# Patient Record
Sex: FEMALE | Race: WHITE | NOT HISPANIC OR LATINO | Employment: OTHER | URBAN - METROPOLITAN AREA
[De-identification: names, ages, dates, MRNs, and addresses within clinical notes are randomized per-mention and may not be internally consistent; named-entity substitution may affect disease eponyms.]

---

## 2017-03-13 DIAGNOSIS — E11.9 TYPE 2 DIABETES, HBA1C GOAL < 7% (H): ICD-10-CM

## 2017-03-13 NOTE — TELEPHONE ENCOUNTER
camryn mariano       Last Written Prescription Date: 9/28/16  Last Fill Quantity: 200,  # refills: 0   Last Office Visit with [facility], [facility] or [facility] prescribing provider: 11/28/16  Last date of pharmacy refill 12/20/16

## 2017-04-27 ENCOUNTER — HOSPITAL ENCOUNTER (OUTPATIENT)
Dept: MAMMOGRAPHY | Facility: CLINIC | Age: 72
Discharge: HOME | End: 2017-04-27
Attending: FAMILY MEDICINE | Admitting: FAMILY MEDICINE
Payer: MEDICARE

## 2017-04-27 DIAGNOSIS — Z12.31 VISIT FOR SCREENING MAMMOGRAM: ICD-10-CM

## 2017-04-27 PROCEDURE — G0202 SCR MAMMO BI INCL CAD: HCPCS

## 2017-05-09 DIAGNOSIS — I10 BENIGN ESSENTIAL HYPERTENSION: ICD-10-CM

## 2017-05-09 RX ORDER — LISINOPRIL 10 MG/1
TABLET ORAL
Qty: 90 TABLET | Refills: 1 | Status: SHIPPED | OUTPATIENT
Start: 2017-05-09 | End: 2017-08-02 | Stop reason: ALTCHOICE

## 2017-05-09 NOTE — TELEPHONE ENCOUNTER
LISINOPRIL 10MG TAB        Last Written Prescription Date: 12/16/16  Last Fill Quantity: 90, # refills: 2  [place] [facility] [place] [facility] or [facility] prescribing provider: 11/28/16       Potassium   Date Value Ref Range Status   06/06/2016 4.6 3.4 - 5.3 mmol/L Final     Creatinine   Date Value Ref Range Status   06/06/2016 0.63 0.52 - 1.04 mg/dL Final     BP Readings from Last 3 Encounters:   11/28/16 136/86   06/01/16 136/82   11/30/15 144/84

## 2017-07-02 DIAGNOSIS — E11.9 TYPE 2 DIABETES, HBA1C GOAL < 7% (H): Primary | ICD-10-CM

## 2017-07-02 DIAGNOSIS — E78.5 HYPERLIPIDEMIA LDL GOAL <100: ICD-10-CM

## 2017-07-03 RX ORDER — PRAVASTATIN SODIUM 40 MG
TABLET ORAL
Qty: 90 TABLET | Refills: 0 | Status: SHIPPED | OUTPATIENT
Start: 2017-07-03 | End: 2017-08-02

## 2017-07-03 NOTE — TELEPHONE ENCOUNTER
Medication is being filled for 1 time refill only due to:  Patient needs labs lipids, microalbumin, hgba1c, bmp. Future labs ordered yes. Patient needs to be seen because overdue for 3 month follow up.. Aramis Torres RN

## 2017-07-03 NOTE — TELEPHONE ENCOUNTER
PRAVASTATIN 40MG TAB       Last Written Prescription Date: 6/1/16  Last Fill Quantity: 90, # refills: 3  Last Office Visit with [facility], P or [facility] prescribing provider: 11/28/16       Lab Results   Component Value Date    CHOL 180 06/06/2016     Lab Results   Component Value Date    HDL 73 06/06/2016     Lab Results   Component Value Date    LDL 81 06/06/2016     Lab Results   Component Value Date    TRIG 131 06/06/2016     Lab Results   Component Value Date    CHOLHDLRATIO 2.7 08/21/2015

## 2017-07-11 ENCOUNTER — TELEPHONE (OUTPATIENT)
Dept: FAMILY MEDICINE | Facility: CLINIC | Age: 72
End: 2017-07-11

## 2017-07-11 NOTE — TELEPHONE ENCOUNTER
Panel Management Review      Patient has the following on her problem list:     Diabetes    ASA: Passed    Last A1C  Lab Results   Component Value Date    A1C 9.4 11/28/2016    A1C 6.2 06/06/2016    A1C 6.9 08/21/2015    A1C 7.1 05/05/2015    A1C 7.3 10/20/2014     A1C tested: FAILED    Last LDL:    Lab Results   Component Value Date    CHOL 180 06/06/2016     Lab Results   Component Value Date    HDL 73 06/06/2016     Lab Results   Component Value Date    LDL 81 06/06/2016     Lab Results   Component Value Date    TRIG 131 06/06/2016     Lab Results   Component Value Date    CHOLHDLRATIO 2.7 08/21/2015     Lab Results   Component Value Date    NHDL 107 06/06/2016       Is the patient on a Statin? YES             Is the patient on Aspirin? YES    Medications     HMG CoA Reductase Inhibitors    pravastatin (PRAVACHOL) 40 MG tablet    Salicylates    aspirin 81 MG tablet          Last three blood pressure readings:  BP Readings from Last 3 Encounters:   11/28/16 136/86   06/01/16 136/82   11/30/15 144/84       Date of last diabetes office visit: 11/2016     Tobacco History:     History   Smoking Status     Former Smoker     Quit date: 1/1/1971   Smokeless Tobacco     Never Used           Composite cancer screening  Chart review shows that this patient is due/due soon for the following None  Summary:    Patient is due/failing the following:   Labs: Micro, Creatinine, A1C and LDL    Action needed:   Patient needs fasting lab only appointment    Type of outreach:    Sent [employer] message.    Questions for provider review:    None                                                                                                                                    Lay Bergeron CMA

## 2017-08-02 ENCOUNTER — OFFICE VISIT (OUTPATIENT)
Dept: FAMILY MEDICINE | Facility: CLINIC | Age: 72
End: 2017-08-02
Payer: COMMERCIAL

## 2017-08-02 VITALS
SYSTOLIC BLOOD PRESSURE: 148 MMHG | HEART RATE: 88 BPM | BODY MASS INDEX: 30.47 KG/M2 | TEMPERATURE: 98.5 F | WEIGHT: 182.9 LBS | HEIGHT: 65 IN | DIASTOLIC BLOOD PRESSURE: 89 MMHG

## 2017-08-02 DIAGNOSIS — Z78.0 ASYMPTOMATIC POSTMENOPAUSAL STATUS: ICD-10-CM

## 2017-08-02 DIAGNOSIS — I10 BENIGN ESSENTIAL HYPERTENSION: ICD-10-CM

## 2017-08-02 DIAGNOSIS — H93.11 TINNITUS, RIGHT: ICD-10-CM

## 2017-08-02 DIAGNOSIS — Z11.59 NEED FOR HEPATITIS C SCREENING TEST: ICD-10-CM

## 2017-08-02 DIAGNOSIS — E11.69 TYPE 2 DIABETES MELLITUS WITH HYPERLIPIDEMIA (H): Primary | ICD-10-CM

## 2017-08-02 DIAGNOSIS — E78.5 HYPERLIPIDEMIA LDL GOAL <100: ICD-10-CM

## 2017-08-02 DIAGNOSIS — E78.5 TYPE 2 DIABETES MELLITUS WITH HYPERLIPIDEMIA (H): Primary | ICD-10-CM

## 2017-08-02 DIAGNOSIS — Z13.89 SCREENING FOR DIABETIC PERIPHERAL NEUROPATHY: ICD-10-CM

## 2017-08-02 LAB
ANION GAP SERPL CALCULATED.3IONS-SCNC: 8 MMOL/L (ref 3–14)
BUN SERPL-MCNC: 11 MG/DL (ref 7–30)
CALCIUM SERPL-MCNC: 9.1 MG/DL (ref 8.5–10.1)
CHLORIDE SERPL-SCNC: 103 MMOL/L (ref 94–109)
CHOLEST SERPL-MCNC: 175 MG/DL
CO2 SERPL-SCNC: 24 MMOL/L (ref 20–32)
CREAT SERPL-MCNC: 0.57 MG/DL (ref 0.52–1.04)
CREAT UR-MCNC: 90 MG/DL
GFR SERPL CREATININE-BSD FRML MDRD: ABNORMAL ML/MIN/1.7M2
GLUCOSE SERPL-MCNC: 174 MG/DL (ref 70–99)
HBA1C MFR BLD: 7.5 % (ref 4.3–6)
HCV AB SERPL QL IA: NORMAL
HDLC SERPL-MCNC: 68 MG/DL
LDLC SERPL CALC-MCNC: 75 MG/DL
MICROALBUMIN UR-MCNC: 6 MG/L
MICROALBUMIN/CREAT UR: 6.25 MG/G CR (ref 0–25)
NONHDLC SERPL-MCNC: 107 MG/DL
POTASSIUM SERPL-SCNC: 4.2 MMOL/L (ref 3.4–5.3)
SODIUM SERPL-SCNC: 135 MMOL/L (ref 133–144)
TRIGL SERPL-MCNC: 159 MG/DL

## 2017-08-02 PROCEDURE — 80061 LIPID PANEL: CPT | Performed by: FAMILY MEDICINE

## 2017-08-02 PROCEDURE — 99214 OFFICE O/P EST MOD 30 MIN: CPT | Performed by: FAMILY MEDICINE

## 2017-08-02 PROCEDURE — 99207 C FOOT EXAM  NO CHARGE: CPT | Performed by: FAMILY MEDICINE

## 2017-08-02 PROCEDURE — 82043 UR ALBUMIN QUANTITATIVE: CPT | Performed by: FAMILY MEDICINE

## 2017-08-02 PROCEDURE — 36415 COLL VENOUS BLD VENIPUNCTURE: CPT | Performed by: FAMILY MEDICINE

## 2017-08-02 PROCEDURE — 83036 HEMOGLOBIN GLYCOSYLATED A1C: CPT | Performed by: FAMILY MEDICINE

## 2017-08-02 PROCEDURE — 86803 HEPATITIS C AB TEST: CPT | Performed by: FAMILY MEDICINE

## 2017-08-02 PROCEDURE — 80048 BASIC METABOLIC PNL TOTAL CA: CPT | Performed by: FAMILY MEDICINE

## 2017-08-02 RX ORDER — PRAVASTATIN SODIUM 40 MG
TABLET ORAL
Qty: 90 TABLET | Refills: 3 | Status: ON HOLD | OUTPATIENT
Start: 2017-08-02 | End: 2018-06-26

## 2017-08-02 RX ORDER — LISINOPRIL 10 MG/1
10 TABLET ORAL DAILY
Qty: 90 TABLET | Refills: 3 | Status: CANCELLED | OUTPATIENT
Start: 2017-08-02

## 2017-08-02 RX ORDER — METFORMIN HCL 500 MG
1000 TABLET, EXTENDED RELEASE 24 HR ORAL 2 TIMES DAILY WITH MEALS
Qty: 360 TABLET | Refills: 3 | Status: SHIPPED | OUTPATIENT
Start: 2017-08-02 | End: 2018-08-15

## 2017-08-02 RX ORDER — LOSARTAN POTASSIUM 50 MG/1
50 TABLET ORAL DAILY
Qty: 90 TABLET | Refills: 3 | Status: SHIPPED | OUTPATIENT
Start: 2017-08-02 | End: 2018-08-03

## 2017-08-02 NOTE — PROGRESS NOTES
SUBJECTIVE:                                                    Mackenzie Trinidad is a 72 year old female who presents to clinic today for the following health issues:    Ringing in ear intermittently for the last few months, in the right ear.     Diabetes Follow-up    Patient is checking blood sugars: fasting 143 this morning, this is average. Later in the day 150    Diabetic concerns: None     Symptoms of hypoglycemia (low blood sugar): none     Paresthesias (numbness or burning in feet) or sores: No     Date of last diabetic eye exam: appt set up next week   Lab Results   Component Value Date    A1C 7.5 08/02/2017    A1C 9.4 11/28/2016    A1C 6.2 06/06/2016    A1C 6.9 08/21/2015    A1C 7.1 05/05/2015     Hyperlipidemia Follow-Up    Taking statin?  Yes, no muscle aches from statin    Other lipid medications/supplements?:  none  Component                          Latest Ref Rng & Units 6/6/2016   Cholesterol                                      <200 mg/dL 180   Triglycerides                                    <150 mg/dL 131   HDL Cholesterol                                  >49 mg/dL 73   LDL Cholesterol Calculated                <100 mg/dL 81   Non HDL Cholesterol                         <130 mg/dL 107     Hypertension Follow-up    Outpatient blood pressures are being checked at home.  Results are 133/79.    Low Salt Diet: low salt  BP Readings from Last 6 Encounters:   08/02/17 148/89   11/28/16 136/86   06/01/16 136/82   11/30/15 144/84   05/05/15 (!) 145/94   01/07/15 122/80       Amount of exercise or physical activity: walks daily    Problems taking medications regularly: No    Medication side effects: none    Diet: regular (no restrictions), low salt, low fat/cholesterol and diabetic      Problem list and histories reviewed & adjusted, as indicated.  Additional history: the blood pressure at home is better   She has joined a weight loss group and she is going to try harder she is playing [employer] ball and she is  going to keep that up   a1c much improved she is doing much better         Patient Active Problem List   Diagnosis     FAMILY HISTORY OF OSTEOPORSIS     FAMILY HISTORY OF DIABETES MELLITUS     FAMILY HISTORY OF COLON CANCER     MYOPIA     PRESBYOPIA     Diverticulosis of large intestine     Hyperlipidemia LDL goal <100     Advanced directives, counseling/discussion     Type 2 diabetes, HbA1c goal < 7% (H)     Type 2 diabetes mellitus with hyperlipidemia (H)     Benign essential hypertension     Past Surgical History:   Procedure Laterality Date     ARTHRODESIS FOOT Right 10/23/2014    Procedure: ARTHRODESIS FOOT;  Surgeon: Mauri Gomez DPM;  Location: [facility] OR     COLONOSCOPY  10/10/2007    Repeat in 5 years     SURGICAL HISTORY OF -   1996    Hysterectomy     SURGICAL HISTORY OF -   3/2002    Right hammer toe/bunionectomy       Social History   Substance Use Topics     Smoking status: Former Smoker     Quit date: 1/1/1971     Smokeless tobacco: Never Used     Alcohol use Yes      Comment: social     Family History   Problem Relation Age of Onset     Hypertension Mother      OSTEOPOROSIS Mother      HEART DISEASE Mother 94     small heart attack     CANCER Father      bone cancer     Hypertension Brother      Lipids Sister      DIABETES Sister      diet controlled     Cancer - colorectal Other      Hypertension Brother      DIABETES Brother      Lipids Brother      Cancer - colorectal Brother      Lipids Brother      Hypertension Sister      Asthma No family hx of      C.A.D. No family hx of      CEREBROVASCULAR DISEASE No family hx of      Breast Cancer No family hx of      Prostate Cancer No family hx of          She does have riniging in the ear for a while and then she ignores it we reviewed all of the potential side effects of her medications and around the time when she started the lisinopril, the tinnitus started it si a listed side effects of the med.   She denies hearing loss or vertigo.   She sajan  "cough or cold sx.  It is not interfering yet with her life but it is increasing in intensity         ROS:  Constitutional, HEENT, cardiovascular, pulmonary, gi and gu systems are negative, except as otherwise noted.      OBJECTIVE:                                                    /89  Pulse 88  Temp 98.5  F (36.9  C) (Tympanic)  Ht 5' 5\" (1.651 m)  Wt 182 lb 14.4 oz (83 kg)  BMI 30.44 kg/m2 Body mass index is 30.44 kg/(m^2).   GENERAL APPEARANCE: healthy, alert and no distress  EYES: Eyes grossly normal to inspection, PERRL, conjunctivae and sclerae normal and nystagmus absent  HENT: ear canals and TM's normal and nose and mouth without ulcers or lesions  NECK: no adenopathy, no asymmetry, masses, or scars and thyroid normal to palpation  RESP: lungs clear to auscultation - no rales, rhonchi or wheezes  CV: regular rates and rhythm, normal S1 S2, no S3 or S4 and no murmur, click or rub  ABDOMEN: soft, nontender, without hepatosplenomegaly or masses and bowel sounds normal  MS: extremities normal- no gross deformities noted, varicose veins mild and peripheral pulses normal  NEURO: Normal strength and tone, mentation intact, speech normal, DTR symmetrically normal in lower extremities, rapid alternating movements normal and normal strength throughout  DIABETIC FOOT EXAM: normal DP and PT pulses, no trophic changes or ulcerative lesions and normal sensory exam       ASSESSMENT/PLAN:                                                      1. Type 2 diabetes mellitus with hyperlipidemia (H)  wellcontrolled   - HEMOGLOBIN A1C  - Lipid panel reflex to direct LDL  - Albumin Random Urine Quantitative  - metFORMIN (GLUCOPHAGE-XR) 500 MG 24 hr tablet; Take 2 tablets (1,000 mg) by mouth 2 times daily (with meals)  Dispense: 360 tablet; Refill: 3    2. Benign essential hypertension  Needs improvement will change to losartan increase dose and see if tinnitus resolves   - Basic metabolic panel    3. Asymptomatic " postmenopausal status  Due for dexa  - DEXA HIP/PELVIS/SPINE - Future; Future    4. Need for hepatitis C screening test    - Hepatitis C Screen Reflex to HCV RNA Quant and Genotype    5. Screening for diabetic peripheral neuropathy  Normal exam   - FOOT EXAM  NO CHARGE [[id number]]    6. Need for prophylactic vaccination against Streptococcus pneumoniae (pneumococcus)    - PNEUMOCOCCAL CONJ VACCINE 13 VALENT IM    7. Hyperlipidemia LDL goal <100  Check labs but has been well controlled   - Lipid panel reflex to direct LDL  - pravastatin (PRAVACHOL) 40 MG tablet; TAKE ONE TABLET EVERY DAY  Dispense: 90 tablet; Refill: 3    8. Tinnitus, right    Try change of medications and if increasing may need ENT referral    - losartan (COZAAR) 50 MG tablet; Take 1 tablet (50 mg) by mouth daily  Dispense: 90 tablet; Refill: 3     reports that she quit smoking about 46 years ago. She has never used smokeless tobacco.        Weight management plan: Discussed healthy diet and exercise guidelines and patient will follow up in 6 months in clinic to re-evaluate.  Dorota Art M.D.  [facility]

## 2017-08-02 NOTE — MR AVS SNAPSHOT
After Visit Summary   8/2/2017    Mackenzie Trinidad    MRN: [id number]           Patient Information     Date Of Birth          1945        Visit Information        Provider Department      8/2/2017 8:30 AM Dorota Art MD [facility]        Today's Diagnoses     Type 2 diabetes mellitus with hyperlipidemia (H)    -  1    Benign essential hypertension        Asymptomatic postmenopausal status        Need for hepatitis C screening test        Screening for diabetic peripheral neuropathy        Hyperlipidemia LDL goal <100        Tinnitus, right           Follow-ups after your visit        Follow-up notes from your care team     Return in about 6 months (around 2/2/2018), or if symptoms worsen or fail to improve.      Who to contact     Normal or non-critical lab and imaging results will be communicated to you by FREECULTRhart, letter or phone within 4 business days after the clinic has received the results. If you do not hear from us within 7 days, please contact the clinic through FREECULTRhart or phone. If you have a critical or abnormal lab result, we will notify you by phone as soon as possible.  Submit refill requests through AQUA PURE or call your pharmacy and they will forward the refill request to us. Please allow 3 business days for your refill to be completed.          If you need to speak with a  for additional information , please call: [phone]             Additional Information About Your Visit        FREECULTRharWee Web Information     AQU[employer] PURE gives you secure access to your electronic health record. If you see a primary care provider, you can also send messages to your care team and make appointments. If you have questions, please call your primary care clinic.  If you do not have a primary care provider, please call [phone] and they will assist you.        Care EveryWhere ID     This is your Care EveryWhere ID. This could be used by other organizations to access your  "[place] medical records  [id number]        Your Vitals Were     Pulse Temperature Height BMI (Body Mass Index)          88 98.5  F (36.9  C) (Tympanic) 5' 5\" (1.651 m) 30.44 kg/m2         Blood Pressure from Last 3 Encounters:   08/02/17 148/89   11/28/16 136/86   06/01/16 136/82    Weight from Last 3 Encounters:   08/02/17 182 lb 14.4 oz (83 kg)   11/28/16 185 lb 14.4 oz (84.3 kg)   06/01/16 184 lb (83.5 kg)              We Performed the Following     Albumin Random Urine Quantitative     Basic metabolic panel     Eye Exam - HIM Scan     FOOT EXAM  NO CHARGE [[id number]]     HEMOGLOBIN A1C     Hepatitis C Screen Reflex to HCV RNA Quant and Genotype     Lipid panel reflex to direct LDL          Today's Medication Changes          These changes are accurate as of: 8/2/17 11:59 PM.  If you have any questions, ask your nurse or doctor.               Start taking these medicines.        Dose/Directions    losartan 50 MG tablet   Commonly known as:  COZAAR   Used for:  Tinnitus, right   Started by:  Dorota Art MD        Dose:  50 mg   Take 1 tablet (50 mg) by mouth daily   Quantity:  90 tablet   Refills:  3         These medicines have changed or have updated prescriptions.        Dose/Directions    metFORMIN 500 MG 24 hr tablet   Commonly known as:  GLUCOPHAGE-XR   This may have changed:  Another medication with the same name was removed. Continue taking this medication, and follow the directions you see here.   Used for:  Type 2 diabetes mellitus with hyperlipidemia (H)   Changed by:  Dorota Art MD        Dose:  1000 mg   Take 2 tablets (1,000 mg) by mouth 2 times daily (with meals)   Quantity:  360 tablet   Refills:  3       pravastatin 40 MG tablet   Commonly known as:  PRAVACHOL   This may have changed:  See the new instructions.   Used for:  Hyperlipidemia LDL goal <100   Changed by:  Dorota Art MD        TAKE ONE TABLET EVERY DAY   Quantity:  90 tablet   Refills:  3         Stop taking these " medicines if you haven't already. Please contact your care team if you have questions.     lisinopril 10 MG tablet   Commonly known as:  PRINIVIL/ZESTRIL   Stopped by:  Dorota Art MD                Where to get your medicines      These medications were sent to [facility] [address]  [address], [facility] [address]     Phone:  [phone]     losartan 50 MG tablet    metFORMIN 500 MG 24 hr tablet    pravastatin 40 MG tablet                Primary Care Provider Office Phone # Fax #    Dorota Art [phone] [address] [facility] [address]        Equal Access to Services     [facility]: Hadii aad ku hadasho Soomaali, waaxda luqadaha, qaybta kaalmada adeegyada, waxay malain haybrynnn sue sorto . So [facility] [phone].    ATENCIÓN: Si habla español, tiene a proctor disposición servicios gratuitos de asistencia lingüística. [facility] [phone].    We comply with applicable federal civil rights laws and [place] laws. We do not discriminate on the basis of race, color, national origin, age, disability sex, sexual orientation or gender identity.            Thank you!     Thank you for choosing [facility]  for your care. Our goal is always to provide you with excellent care. Hearing back from our patients is one way we can continue to improve our services. Please take a few minutes to complete the written survey that you may receive in the mail after your visit with us. Thank you!             Your Updated Medication List - Protect others around you: Learn how to safely use, store and throw away your medicines at www.disposemymeds.org.          This list is accurate as of: 8/2/17 11:59 PM.  Always use your most recent med list.                   Brand Name Dispense Instructions for use Diagnosis    aspirin 81 MG tablet      Take  by mouth daily.        blood glucose monitoring lancets     100 each    1 each by Lancet route 2 times  daily    Diabetes mellitus, type 2 (H)       CALCIUM 500 + D PO      1 tablet twice daily        FLAX SEED OIL PO      twice daily        losartan 50 MG tablet    COZAAR    90 tablet    Take 1 tablet (50 mg) by mouth daily    Tinnitus, right       metFORMIN 500 MG 24 hr tablet    GLUCOPHAGE-XR    360 tablet    Take 2 tablets (1,000 mg) by mouth 2 times daily (with meals)    Type 2 diabetes mellitus with hyperlipidemia (H)       pravastatin 40 MG tablet    PRAVACHOL    90 tablet    TAKE ONE TABLET EVERY DAY    Hyperlipidemia LDL goal <100

## 2017-08-06 NOTE — PROGRESS NOTES
Mackenzie,  Your lab results were normal/stable. Please feel free to my chart or call the office with questions. Dorota Art M.D.

## 2017-08-07 DIAGNOSIS — E11.9 TYPE 2 DIABETES, HBA1C GOAL < 7% (H): ICD-10-CM

## 2017-08-07 NOTE — TELEPHONE ENCOUNTER
ACCU-CHEK JULIO CÉSAR + JULIO CÉSAR ALIYAH        Last Written Prescription Date: 3/15/17  Last Fill Quantity: 200,  # refills: 0   Last Office Visit with [facility], [facility] or [facility] prescribing provider: 8/2/17

## 2017-08-08 RX ORDER — BLOOD SUGAR DIAGNOSTIC
STRIP MISCELLANEOUS
Qty: 200 STRIP | Refills: 3 | Status: SHIPPED | OUTPATIENT
Start: 2017-08-08 | End: 2018-09-08

## 2017-08-08 NOTE — TELEPHONE ENCOUNTER
Prescription approved per [facility] Refill Protocol.  Aramis Torres RN

## 2017-08-17 ENCOUNTER — TRANSFERRED RECORDS (OUTPATIENT)
Dept: HEALTH INFORMATION MANAGEMENT | Facility: CLINIC | Age: 72
End: 2017-08-17

## 2017-09-07 ENCOUNTER — HOSPITAL ENCOUNTER (OUTPATIENT)
Dept: BONE DENSITY | Facility: CLINIC | Age: 72
Discharge: HOME | End: 2017-09-07
Attending: FAMILY MEDICINE | Admitting: FAMILY MEDICINE
Payer: MEDICARE

## 2017-09-07 DIAGNOSIS — Z78.0 ASYMPTOMATIC POSTMENOPAUSAL STATUS: ICD-10-CM

## 2017-09-07 PROCEDURE — 77080 DXA BONE DENSITY AXIAL: CPT

## 2017-09-21 NOTE — NURSING NOTE
"Chief Complaint   Patient presents with     Diabetes       Initial /89  Pulse 88  Temp 98.5  F (36.9  C) (Tympanic)  Ht 5' 5\" (1.651 m)  Wt 182 lb 14.4 oz (83 kg)  BMI 30.44 kg/m2 Estimated body mass index is 30.44 kg/(m^2) as calculated from the following:    Height as of this encounter: 5' 5\" (1.651 m).    Weight as of this encounter: 182 lb 14.4 oz (83 kg).  Medication Reconciliation: complete   Lay Bergeron CMA    "

## 2018-01-30 ENCOUNTER — TELEPHONE (OUTPATIENT)
Dept: FAMILY MEDICINE | Facility: CLINIC | Age: 73
End: 2018-01-30

## 2018-01-30 NOTE — TELEPHONE ENCOUNTER
Panel Management Review      Patient has the following on her problem list:     Diabetes    ASA: Passed    Last A1C  Lab Results   Component Value Date    A1C 7.5 08/02/2017    A1C 9.4 11/28/2016    A1C 6.2 06/06/2016    A1C 6.9 08/21/2015    A1C 7.1 05/05/2015     A1C tested: Passed    Last LDL:    Lab Results   Component Value Date    CHOL 175 08/02/2017     Lab Results   Component Value Date    HDL 68 08/02/2017     Lab Results   Component Value Date    LDL 75 08/02/2017     Lab Results   Component Value Date    TRIG 159 08/02/2017     Lab Results   Component Value Date    CHOLHDLRATIO 2.7 08/21/2015     Lab Results   Component Value Date    NHDL 107 08/02/2017       Is the patient on a Statin? YES             Is the patient on Aspirin? YES    Medications     HMG CoA Reductase Inhibitors    pravastatin (PRAVACHOL) 40 MG tablet    Salicylates    aspirin 81 MG tablet          Last three blood pressure readings:  BP Readings from Last 3 Encounters:   08/02/17 148/89   11/28/16 136/86   06/01/16 136/82       Date of last diabetes office visit: 8/2017     Tobacco History:     History   Smoking Status     Former Smoker     Quit date: 1/1/1971   Smokeless Tobacco     Never Used           Composite cancer screening  Chart review shows that this patient is due/due soon for the following None  Summary:    Patient is due/failing the following:   BP CHECK    Action needed:   Patient needs nurse only appointment.    Type of outreach:    Sent "[employer] message.    Questions for provider review:    None                                                                                                                                    Lay Bergeron CMA

## 2018-04-05 ENCOUNTER — TELEPHONE (OUTPATIENT)
Dept: FAMILY MEDICINE | Facility: CLINIC | Age: 73
End: 2018-04-05

## 2018-04-05 NOTE — LETTER
April 5, 2018      Mackenzie Trinidad  [address]  [place] [address]        Dear Mackenzie,    In order to ensure we are providing the best quality care, Dr. Art and I have reviewed your chart and see that you are due for a Diabetes follow up, including lab work.     Please call to set up an office visit at [phone] or you can schedule an appointment through My Chart.     We greatly appreciate the opportunity to serve you. Thank you for trusting us with your health care.    Sincerely,    DIRK Bridges M.D.

## 2018-04-05 NOTE — TELEPHONE ENCOUNTER
Panel Management Review      Patient has the following on her problem list:     Diabetes    ASA: Passed    Last A1C  Lab Results   Component Value Date    A1C 7.5 08/02/2017    A1C 9.4 11/28/2016    A1C 6.2 06/06/2016    A1C 6.9 08/21/2015    A1C 7.1 05/05/2015     A1C tested: Passed    Last LDL:    Lab Results   Component Value Date    CHOL 175 08/02/2017     Lab Results   Component Value Date    HDL 68 08/02/2017     Lab Results   Component Value Date    LDL 75 08/02/2017     Lab Results   Component Value Date    TRIG 159 08/02/2017     Lab Results   Component Value Date    CHOLHDLRATIO 2.7 08/21/2015     Lab Results   Component Value Date    NHDL 107 08/02/2017       Is the patient on a Statin? YES             Is the patient on Aspirin? YES    Medications     HMG CoA Reductase Inhibitors    pravastatin (PRAVACHOL) 40 MG tablet    Salicylates    aspirin 81 MG tablet          Last three blood pressure readings:  BP Readings from Last 3 Encounters:   08/02/17 148/89   11/28/16 136/86   06/01/16 136/82       Date of last diabetes office visit: 8/2/17     Tobacco History:     History   Smoking Status     Former Smoker     Quit date: 1/1/1971   Smokeless Tobacco     Never Used           Composite cancer screening  Chart review shows that this patient is due/due soon for the following Mammogram  Summary:    Patient is due/failing the following:   A1C, FOLLOW UP and MAMMOGRAM    Action needed:   Patient needs office visit for diabetes, bp recheck, a1c.    Type of outreach:    Sent letter.    Questions for provider review:    None                                                                                                                                    Lay Bergeron CMA

## 2018-05-01 ENCOUNTER — HOSPITAL ENCOUNTER (OUTPATIENT)
Dept: MAMMOGRAPHY | Facility: CLINIC | Age: 73
Discharge: HOME | End: 2018-05-01
Attending: FAMILY MEDICINE | Admitting: FAMILY MEDICINE
Payer: MEDICARE

## 2018-05-01 DIAGNOSIS — Z12.31 VISIT FOR SCREENING MAMMOGRAM: ICD-10-CM

## 2018-05-01 PROCEDURE — 77063 BREAST TOMOSYNTHESIS BI: CPT

## 2018-05-18 DIAGNOSIS — H93.11 TINNITUS, RIGHT: ICD-10-CM

## 2018-05-18 RX ORDER — LOSARTAN POTASSIUM 50 MG/1
50 TABLET ORAL DAILY
Qty: 90 TABLET | Refills: 3 | Status: CANCELLED | OUTPATIENT
Start: 2018-05-18

## 2018-05-18 NOTE — TELEPHONE ENCOUNTER
losartan (COZAAR) 50 MG tablet  Last Written Prescription Date:  8/2/17  Last Fill Quantity: 90,  # refills: 3   Last office visit: 8/2/2017 with prescribing provider:  8/2/17   Future Office Visit:

## 2018-05-18 NOTE — TELEPHONE ENCOUNTER
Routing refill request to provider for review/approval because:  Patient was instructed to return around 2/2/18 for a 6 month follow up and has not yet followed up.    Ngoc Clement RN

## 2018-06-23 ENCOUNTER — APPOINTMENT (OUTPATIENT)
Dept: GENERAL RADIOLOGY | Facility: CLINIC | Age: 73
DRG: 355 | End: 2018-06-23
Attending: INTERNAL MEDICINE
Payer: MEDICARE

## 2018-06-23 ENCOUNTER — HOSPITAL ENCOUNTER (INPATIENT)
Facility: CLINIC | Age: 73
LOS: 3 days | Discharge: HOME | DRG: 355 | End: 2018-06-26
Attending: INTERNAL MEDICINE | Admitting: SURGERY
Payer: MEDICARE

## 2018-06-23 ENCOUNTER — APPOINTMENT (OUTPATIENT)
Dept: CT IMAGING | Facility: CLINIC | Age: 73
DRG: 355 | End: 2018-06-23
Attending: INTERNAL MEDICINE
Payer: MEDICARE

## 2018-06-23 DIAGNOSIS — K56.609 SBO (SMALL BOWEL OBSTRUCTION) (H): ICD-10-CM

## 2018-06-23 DIAGNOSIS — E78.5 HYPERLIPIDEMIA LDL GOAL <100: ICD-10-CM

## 2018-06-23 DIAGNOSIS — K43.6 VENTRAL HERNIA WITH OBSTRUCTION AND WITHOUT GANGRENE: ICD-10-CM

## 2018-06-23 PROBLEM — K43.9 VENTRAL HERNIA: Status: ACTIVE | Noted: 2018-06-23

## 2018-06-23 LAB
ALBUMIN SERPL-MCNC: 3.6 G/DL (ref 3.4–5)
ALBUMIN UR-MCNC: NEGATIVE MG/DL
ALP SERPL-CCNC: 69 U/L (ref 40–150)
ALT SERPL W P-5'-P-CCNC: 28 U/L (ref 0–50)
ANION GAP SERPL CALCULATED.3IONS-SCNC: 11 MMOL/L (ref 3–14)
APPEARANCE UR: CLEAR
AST SERPL W P-5'-P-CCNC: 20 U/L (ref 0–45)
BACTERIA #/AREA URNS HPF: ABNORMAL /HPF
BASOPHILS # BLD AUTO: 0.1 10E9/L (ref 0–0.2)
BASOPHILS NFR BLD AUTO: 0.8 %
BILIRUB SERPL-MCNC: 0.3 MG/DL (ref 0.2–1.3)
BILIRUB UR QL STRIP: NEGATIVE
BUN SERPL-MCNC: 11 MG/DL (ref 7–30)
CALCIUM SERPL-MCNC: 8.8 MG/DL (ref 8.5–10.1)
CHLORIDE SERPL-SCNC: 102 MMOL/L (ref 94–109)
CO2 SERPL-SCNC: 25 MMOL/L (ref 20–32)
COLOR UR AUTO: ABNORMAL
CREAT SERPL-MCNC: 0.63 MG/DL (ref 0.52–1.04)
DIFFERENTIAL METHOD BLD: NORMAL
EOSINOPHIL # BLD AUTO: 0.7 10E9/L (ref 0–0.7)
EOSINOPHIL NFR BLD AUTO: 10.6 %
ERYTHROCYTE [DISTWIDTH] IN BLOOD BY AUTOMATED COUNT: 12.9 % (ref 10–15)
GFR SERPL CREATININE-BSD FRML MDRD: >90 ML/MIN/1.7M2
GLUCOSE BLDC GLUCOMTR-MCNC: 185 MG/DL (ref 70–99)
GLUCOSE SERPL-MCNC: 203 MG/DL (ref 70–99)
GLUCOSE UR STRIP-MCNC: 150 MG/DL
HCT VFR BLD AUTO: 39.2 % (ref 35–47)
HGB BLD-MCNC: 13.4 G/DL (ref 11.7–15.7)
HGB UR QL STRIP: NEGATIVE
HYALINE CASTS #/AREA URNS LPF: 1 /LPF (ref 0–2)
IMM GRANULOCYTES # BLD: 0 10E9/L (ref 0–0.4)
IMM GRANULOCYTES NFR BLD: 0.2 %
INR PPP: 0.95 (ref 0.86–1.14)
KETONES UR STRIP-MCNC: 40 MG/DL
LACTATE BLD-SCNC: 2.7 MMOL/L (ref 0.7–2)
LEUKOCYTE ESTERASE UR QL STRIP: ABNORMAL
LIPASE SERPL-CCNC: 169 U/L (ref 73–393)
LYMPHOCYTES # BLD AUTO: 2.5 10E9/L (ref 0.8–5.3)
LYMPHOCYTES NFR BLD AUTO: 40.1 %
MCH RBC QN AUTO: 30.6 PG (ref 26.5–33)
MCHC RBC AUTO-ENTMCNC: 34.2 G/DL (ref 31.5–36.5)
MCV RBC AUTO: 90 FL (ref 78–100)
MONOCYTES # BLD AUTO: 0.4 10E9/L (ref 0–1.3)
MONOCYTES NFR BLD AUTO: 5.7 %
NEUTROPHILS # BLD AUTO: 2.6 10E9/L (ref 1.6–8.3)
NEUTROPHILS NFR BLD AUTO: 42.6 %
NITRATE UR QL: NEGATIVE
NRBC # BLD AUTO: 0 10*3/UL
NRBC BLD AUTO-RTO: 0 /100
NT-PROBNP SERPL-MCNC: 121 PG/ML (ref 0–900)
PH UR STRIP: 7 PH (ref 5–7)
PLATELET # BLD AUTO: 220 10E9/L (ref 150–450)
POTASSIUM SERPL-SCNC: 3.8 MMOL/L (ref 3.4–5.3)
PROT SERPL-MCNC: 7 G/DL (ref 6.8–8.8)
RBC # BLD AUTO: 4.38 10E12/L (ref 3.8–5.2)
RBC #/AREA URNS AUTO: <1 /HPF (ref 0–2)
SODIUM SERPL-SCNC: 138 MMOL/L (ref 133–144)
SOURCE: ABNORMAL
SP GR UR STRIP: 1.02 (ref 1–1.03)
SQUAMOUS #/AREA URNS AUTO: <1 /HPF (ref 0–1)
TROPONIN I BLD-MCNC: 0 UG/L (ref 0–0.1)
TROPONIN I SERPL-MCNC: <0.015 UG/L (ref 0–0.04)
UROBILINOGEN UR STRIP-MCNC: NORMAL MG/DL (ref 0–2)
WBC # BLD AUTO: 6.1 10E9/L (ref 4–11)
WBC #/AREA URNS AUTO: <1 /HPF (ref 0–5)

## 2018-06-23 PROCEDURE — 83690 ASSAY OF LIPASE: CPT | Performed by: INTERNAL MEDICINE

## 2018-06-23 PROCEDURE — 93308 TTE F-UP OR LMTD: CPT | Mod: 26 | Performed by: INTERNAL MEDICINE

## 2018-06-23 PROCEDURE — 85610 PROTHROMBIN TIME: CPT | Performed by: INTERNAL MEDICINE

## 2018-06-23 PROCEDURE — 96374 THER/PROPH/DIAG INJ IV PUSH: CPT | Mod: 59 | Performed by: INTERNAL MEDICINE

## 2018-06-23 PROCEDURE — 80053 COMPREHEN METABOLIC PANEL: CPT | Performed by: INTERNAL MEDICINE

## 2018-06-23 PROCEDURE — 00000146 ZZHCL STATISTIC GLUCOSE BY METER IP

## 2018-06-23 PROCEDURE — 93010 ELECTROCARDIOGRAM REPORT: CPT | Mod: 59 | Performed by: INTERNAL MEDICINE

## 2018-06-23 PROCEDURE — 74177 CT ABD & PELVIS W/CONTRAST: CPT

## 2018-06-23 PROCEDURE — 71045 X-RAY EXAM CHEST 1 VIEW: CPT

## 2018-06-23 PROCEDURE — 99285 EMERGENCY DEPT VISIT HI MDM: CPT | Mod: 25 | Performed by: INTERNAL MEDICINE

## 2018-06-23 PROCEDURE — 85025 COMPLETE CBC W/AUTO DIFF WBC: CPT | Performed by: INTERNAL MEDICINE

## 2018-06-23 PROCEDURE — 25000128 H RX IP 250 OP 636: Performed by: STUDENT IN AN ORGANIZED HEALTH CARE EDUCATION/TRAINING PROGRAM

## 2018-06-23 PROCEDURE — 25000128 H RX IP 250 OP 636: Performed by: INTERNAL MEDICINE

## 2018-06-23 PROCEDURE — 25000125 ZZHC RX 250: Performed by: STUDENT IN AN ORGANIZED HEALTH CARE EDUCATION/TRAINING PROGRAM

## 2018-06-23 PROCEDURE — 93308 TTE F-UP OR LMTD: CPT | Performed by: INTERNAL MEDICINE

## 2018-06-23 PROCEDURE — 99291 CRITICAL CARE FIRST HOUR: CPT | Mod: 25 | Performed by: INTERNAL MEDICINE

## 2018-06-23 PROCEDURE — 84484 ASSAY OF TROPONIN QUANT: CPT | Performed by: INTERNAL MEDICINE

## 2018-06-23 PROCEDURE — 84484 ASSAY OF TROPONIN QUANT: CPT

## 2018-06-23 PROCEDURE — 93005 ELECTROCARDIOGRAM TRACING: CPT | Performed by: INTERNAL MEDICINE

## 2018-06-23 PROCEDURE — 83880 ASSAY OF NATRIURETIC PEPTIDE: CPT | Performed by: INTERNAL MEDICINE

## 2018-06-23 PROCEDURE — 96376 TX/PRO/DX INJ SAME DRUG ADON: CPT | Performed by: INTERNAL MEDICINE

## 2018-06-23 PROCEDURE — 96375 TX/PRO/DX INJ NEW DRUG ADDON: CPT | Performed by: INTERNAL MEDICINE

## 2018-06-23 PROCEDURE — 83605 ASSAY OF LACTIC ACID: CPT | Performed by: INTERNAL MEDICINE

## 2018-06-23 PROCEDURE — 12000008 ZZH R&B INTERMEDIATE UMMC

## 2018-06-23 PROCEDURE — 81001 URINALYSIS AUTO W/SCOPE: CPT | Performed by: INTERNAL MEDICINE

## 2018-06-23 PROCEDURE — 25000128 H RX IP 250 OP 636: Performed by: SURGERY

## 2018-06-23 RX ORDER — ONDANSETRON 2 MG/ML
4 INJECTION INTRAMUSCULAR; INTRAVENOUS ONCE
Status: COMPLETED | OUTPATIENT
Start: 2018-06-23 | End: 2018-06-23

## 2018-06-23 RX ORDER — HYDROMORPHONE HYDROCHLORIDE 1 MG/ML
0.5 INJECTION, SOLUTION INTRAMUSCULAR; INTRAVENOUS; SUBCUTANEOUS ONCE
Status: COMPLETED | OUTPATIENT
Start: 2018-06-23 | End: 2018-06-23

## 2018-06-23 RX ORDER — IOPAMIDOL 755 MG/ML
112 INJECTION, SOLUTION INTRAVASCULAR ONCE
Status: COMPLETED | OUTPATIENT
Start: 2018-06-23 | End: 2018-06-23

## 2018-06-23 RX ORDER — ONDANSETRON 2 MG/ML
4 INJECTION INTRAMUSCULAR; INTRAVENOUS EVERY 6 HOURS PRN
Status: DISCONTINUED | OUTPATIENT
Start: 2018-06-23 | End: 2018-06-26 | Stop reason: HOSPADM

## 2018-06-23 RX ORDER — ASPIRIN 325 MG
325 TABLET ORAL ONCE
Status: DISCONTINUED | OUTPATIENT
Start: 2018-06-23 | End: 2018-06-23 | Stop reason: CLARIF

## 2018-06-23 RX ORDER — NALOXONE HYDROCHLORIDE 0.4 MG/ML
.1-.4 INJECTION, SOLUTION INTRAMUSCULAR; INTRAVENOUS; SUBCUTANEOUS
Status: DISCONTINUED | OUTPATIENT
Start: 2018-06-23 | End: 2018-06-25

## 2018-06-23 RX ORDER — METOCLOPRAMIDE HYDROCHLORIDE 5 MG/ML
5 INJECTION INTRAMUSCULAR; INTRAVENOUS ONCE
Status: COMPLETED | OUTPATIENT
Start: 2018-06-23 | End: 2018-06-23

## 2018-06-23 RX ORDER — ACETAMINOPHEN 325 MG/1
650 TABLET ORAL EVERY 4 HOURS PRN
Status: DISCONTINUED | OUTPATIENT
Start: 2018-06-23 | End: 2018-06-26 | Stop reason: HOSPADM

## 2018-06-23 RX ORDER — ONDANSETRON 4 MG/1
4 TABLET, ORALLY DISINTEGRATING ORAL EVERY 6 HOURS PRN
Status: DISCONTINUED | OUTPATIENT
Start: 2018-06-23 | End: 2018-06-26 | Stop reason: HOSPADM

## 2018-06-23 RX ORDER — SODIUM CHLORIDE, SODIUM LACTATE, POTASSIUM CHLORIDE, CALCIUM CHLORIDE 600; 310; 30; 20 MG/100ML; MG/100ML; MG/100ML; MG/100ML
INJECTION, SOLUTION INTRAVENOUS CONTINUOUS
Status: DISCONTINUED | OUTPATIENT
Start: 2018-06-23 | End: 2018-06-25

## 2018-06-23 RX ADMIN — HYDROMORPHONE HYDROCHLORIDE 0.5 MG: 1 INJECTION, SOLUTION INTRAMUSCULAR; INTRAVENOUS; SUBCUTANEOUS at 19:18

## 2018-06-23 RX ADMIN — METOCLOPRAMIDE 5 MG: 5 INJECTION, SOLUTION INTRAMUSCULAR; INTRAVENOUS at 18:46

## 2018-06-23 RX ADMIN — SODIUM CHLORIDE, PRESERVATIVE FREE 78 ML: 5 INJECTION INTRAVENOUS at 20:09

## 2018-06-23 RX ADMIN — ONDANSETRON 4 MG: 2 INJECTION INTRAMUSCULAR; INTRAVENOUS at 18:28

## 2018-06-23 RX ADMIN — SODIUM CHLORIDE 1000 ML: 9 INJECTION, SOLUTION INTRAVENOUS at 19:19

## 2018-06-23 RX ADMIN — IOPAMIDOL 112 ML: 755 INJECTION, SOLUTION INTRAVENOUS at 20:09

## 2018-06-23 RX ADMIN — HYDROMORPHONE HYDROCHLORIDE 0.5 MG: 1 INJECTION, SOLUTION INTRAMUSCULAR; INTRAVENOUS; SUBCUTANEOUS at 18:45

## 2018-06-23 RX ADMIN — HYDROMORPHONE HYDROCHLORIDE 0.5 MG: 1 INJECTION, SOLUTION INTRAMUSCULAR; INTRAVENOUS; SUBCUTANEOUS at 21:42

## 2018-06-23 ASSESSMENT — ENCOUNTER SYMPTOMS
NAUSEA: 1
DIAPHORESIS: 1
ABDOMINAL PAIN: 1

## 2018-06-23 NOTE — ED PROVIDER NOTES
History     Chief Complaint   Patient presents with     Abdominal Pain     HPI  Mackenzie Trinidad is a 73 year old female with a history of type II diabetes, HTN, appendectomy, prior hernia repair, and hysterectomy who presents for evaluation of abdominal pain. Patient complains of sudden onset sharp, cramping epigastric abdominal pain with associated nausea and diaphoresis that began approximately thirty minutes prior to arrival. Her  reports they were at the [place] [employer] game prior to the onset of her pain where she had eaten one hot dog, one mini donut, and drank one beer. Patient denies any history of similar symptoms. No known history of MI.       I have reviewed the Medications, Allergies, Past Medical and Surgical History, and Social History in the [employer] system.  Past Medical History:   Diagnosis Date     Acute pharyngitis      Gynecological examination      Need for prophylactic hormone replacement therapy (postmenopausal)      PONV (postoperative nausea and vomiting)        Past Surgical History:   Procedure Laterality Date     ARTHRODESIS FOOT Right 10/23/2014    Procedure: ARTHRODESIS FOOT;  Surgeon: Mauri Gomez DPM;  Location: [facility]     COLONOSCOPY  10/10/2007    Repeat in 5 years     SURGICAL HISTORY OF -   1996    Hysterectomy     SURGICAL HISTORY OF -   3/2002    Right hammer toe/bunionectomy       Family History   Problem Relation Age of Onset     Hypertension Mother      Osteoperosis Mother      HEART DISEASE Mother 94     small heart attack     Cancer Father      bone cancer     Hypertension Brother      Lipids Sister      Diabetes Sister      diet controlled     Cancer - colorectal Other      Hypertension Brother      Diabetes Brother      Lipids Brother      Cancer - colorectal Brother      Lipids Brother      Hypertension Sister      Asthma No family hx of      C.A.D. No family hx of      Cerebrovascular Disease No family hx of      Breast Cancer No family hx of      Prostate  Cancer No family hx of        Social History   Substance Use Topics     Smoking status: Former Smoker     Quit date: 1/1/1971     Smokeless tobacco: Never Used     Alcohol use Yes      Comment: social       Current Facility-Administered Medications   Medication     acetaminophen (TYLENOL) tablet 650 mg     aspirin tablet 325 mg     lactated ringers BOLUS 500 mL     lactated ringers infusion     melatonin tablet 1 mg     naloxone (NARCAN) injection 0.1-0.4 mg     ondansetron (ZOFRAN-ODT) ODT tab 4 mg    Or     ondansetron (ZOFRAN) injection 4 mg     Current Outpatient Prescriptions   Medication     aspirin 81 MG tablet     metFORMIN (GLUCOPHAGE-XR) 500 MG 24 hr tablet     ACCU-CHEK JULIO CÉSAR PLUS test strip     CALCIUM 500 + D OR     FLAX SEED OIL OR     FREESTYLE LANCETS MISC     losartan (COZAAR) 50 MG tablet     pravastatin (PRAVACHOL) 40 MG tablet        Allergies   Allergen Reactions     Nkda [No Known Drug Allergies]        Review of Systems   Constitutional: Positive for diaphoresis.   Gastrointestinal: Positive for abdominal pain (epigastric) and nausea.   All other systems reviewed and are negative.      Physical Exam   BP: 168/79  Pulse: 57  Heart Rate: 51  Temp: 97.7  F (36.5  C)  Resp: 16  SpO2: 100 %      Physical Exam   Constitutional: No distress.   HENT:   Head: Atraumatic.   Mouth/Throat: Oropharynx is clear and moist. No oropharyngeal exudate.   Eyes: Pupils are equal, round, and reactive to light. No scleral icterus.   Neck: Neck supple.   Cardiovascular: Normal rate, normal heart sounds and intact distal pulses.  Exam reveals no gallop and no friction rub.    No murmur heard.  Pulmonary/Chest: Effort normal and breath sounds normal. No respiratory distress. She has no wheezes. She has no rales. She exhibits no tenderness.   Abdominal: Soft. Bowel sounds are normal. There is no hepatosplenomegaly. There is tenderness in the periumbilical area. There is no rigidity, no rebound, no guarding, no CVA  tenderness, no tenderness at McBurney's point and negative Lara's sign. A hernia (right lower) is present.       Musculoskeletal: She exhibits no edema or tenderness.   Skin: Skin is warm. No rash noted. She is not diaphoretic.       ED Course     ED Course     Procedures    6:02 PM  The patient was seen and examined by Dr. Kaufman in Room 1.          EKG Interpretation:      Interpreted by Dre Kaufman MD  Time reviewed: 18:04  Symptoms at time of EKG: abdominal pain   Rhythm: sinus bradycardia  Rate: 54 bpm  Axis: normal  Ectopy: none  Conduction: normal  ST Segments/ T Waves: No ST-T wave changes  Q Waves: none  Comparison to prior: no significant changes from 10/20/14    Clinical Impression: normal EKG      Critical Care time:  was 40 minutes for this patient excluding procedures.  Results for orders placed or performed during the hospital encounter of 06/23/18 (from the past 24 hour(s))   Glucose by meter     Status: Abnormal    Collection Time: 06/23/18  6:04 PM   Result Value Ref Range    Glucose 185 (H) 70 - 99 mg/dL   EKG 12-lead, tracing only     Status: None (Preliminary result)    Collection Time: 06/23/18  6:04 PM   Result Value Ref Range    Interpretation ECG Click View Image link to view waveform and result    Troponin POCT     Status: None    Collection Time: 06/23/18  6:15 PM   Result Value Ref Range    Troponin I 0.00 0.00 - 0.10 ug/L   Comprehensive metabolic panel     Status: Abnormal    Collection Time: 06/23/18  6:17 PM   Result Value Ref Range    Sodium 138 133 - 144 mmol/L    Potassium 3.8 3.4 - 5.3 mmol/L    Chloride 102 94 - 109 mmol/L    Carbon Dioxide 25 20 - 32 mmol/L    Anion Gap 11 3 - 14 mmol/L    Glucose 203 (H) 70 - 99 mg/dL    Urea Nitrogen 11 7 - 30 mg/dL    Creatinine 0.63 0.52 - 1.04 mg/dL    GFR Estimate >90 >60 mL/min/1.7m2    GFR Estimate If Black >90 >60 mL/min/1.7m2    Calcium 8.8 8.5 - 10.1 mg/dL    Bilirubin Total 0.3 0.2 - 1.3 mg/dL    Albumin 3.6 3.4 - 5.0 g/dL     Protein Total 7.0 6.8 - 8.8 g/dL    Alkaline Phosphatase 69 40 - 150 U/L    ALT 28 0 - 50 U/L    AST 20 0 - 45 U/L   Lactic acid whole blood     Status: Abnormal    Collection Time: 06/23/18  6:17 PM   Result Value Ref Range    Lactic Acid 2.7 (H) 0.7 - 2.0 mmol/L   Nt probnp inpatient (BNP)     Status: None    Collection Time: 06/23/18  6:17 PM   Result Value Ref Range    N-Terminal Pro BNP Inpatient 121 0 - 900 pg/mL   Lipase     Status: None    Collection Time: 06/23/18  6:17 PM   Result Value Ref Range    Lipase 169 73 - 393 U/L   Troponin I     Status: None    Collection Time: 06/23/18  6:17 PM   Result Value Ref Range    Troponin I ES <0.015 0.000 - 0.045 ug/L   POC US ECHO LIMITED     Status: None    Collection Time: 06/23/18  6:24 PM    Impression    Limited Bedside Cardiac Ultrasound, performed and interpreted by me.   Indication: Chest Pain.  Parasternal long axis, parasternal short axis and apical 4 chamber views were acquired.   Image quality was satisfactory.    Findings:    Global left ventricular function appears intact.  Chambers do not appear dilated.  There is no evidence of free fluid within the pericardium.    IMPRESSION: Grossly normal left ventricular function and chamber size.  No pericardial effusion.    [facility] Procedure Note      Limited Bedside ED Aorta Ultrasound:    PROCEDURE: PERFORMED BY: Dr. Dre Kaufman MD  INDICATIONS:  Abdominal Pain    PROBE:  Low frequency convex probe  BODY LOCATION: Abdomen  FINDINGS:  The ultrasound was performed using longitudinal and transverse views.   Normal: Abdominal aorta < 4 cm.  MEASUREMENT:  1.9 cm   No evidence of free fluid in hepatorenal (Morison s pouch), perisplenic, or and pelvic areas. No evidence of pericardial effusion.  INTERPRETATION:  The evaluation of the aorta was of normal caliber (ie < 4cm in the transverse/longitudinal views) without evidence of aneurysm or dilation.  Aorta visualized in entirety from insertion into the  intra-abdominal cavity to bifurcation into iliac vessels. There was no abdominal free fluid.  IMAGE DOCUMENTATION: Images were archived to PACs system. .    CBC with platelets differential     Status: None    Collection Time: 06/23/18  6:27 PM   Result Value Ref Range    WBC 6.1 4.0 - 11.0 10e9/L    RBC Count 4.38 3.8 - 5.2 10e12/L    Hemoglobin 13.4 11.7 - 15.7 g/dL    Hematocrit 39.2 35.0 - 47.0 %    MCV 90 78 - 100 fl    MCH 30.6 26.5 - 33.0 pg    MCHC 34.2 31.5 - 36.5 g/dL    RDW 12.9 10.0 - 15.0 %    Platelet Count 220 150 - 450 10e9/L    Diff Method Automated Method     % Neutrophils 42.6 %    % Lymphocytes 40.1 %    % Monocytes 5.7 %    % Eosinophils 10.6 %    % Basophils 0.8 %    % Immature Granulocytes 0.2 %    Nucleated RBCs 0 0 /100    Absolute Neutrophil 2.6 1.6 - 8.3 10e9/L    Absolute Lymphocytes 2.5 0.8 - 5.3 10e9/L    Absolute Monocytes 0.4 0.0 - 1.3 10e9/L    Absolute Eosinophils 0.7 0.0 - 0.7 10e9/L    Absolute Basophils 0.1 0.0 - 0.2 10e9/L    Abs Immature Granulocytes 0.0 0 - 0.4 10e9/L    Absolute Nucleated RBC 0.0    INR     Status: None    Collection Time: 06/23/18  6:27 PM   Result Value Ref Range    INR 0.95 0.86 - 1.14   XR Chest Port 1 View     Status: None    Collection Time: 06/23/18  6:54 PM    Narrative    XR CHEST PORT 1 VW  6/23/2018 6:54 PM      HISTORY: cp;     COMPARISON: None    FINDINGS: The cardiomediastinal silhouette and pulmonary vasculature  are within normal limits. No focal pulmonary opacity. No pleural  effusion or pneumothorax are identified.      Impression    IMPRESSION: No acute cardiopulmonary abnormality.    I have personally reviewed the examination and initial interpretation  and I agree with the findings.    MANNY RICE MD   CT Abdomen Pelvis w Contrast     Status: None    Collection Time: 06/23/18  8:31 PM    Narrative    EXAMINATION: CT abdomen and pelvis, 6/23/2018 8:31 PM    TECHNIQUE:  Helical CT images from the lung bases through the  symphysis  pubis were obtained with IV contrast.    COMPARISON: No similar study.    HISTORY: Abdominal pain.    FINDINGS:    Abdomen and pelvis: Ventral abdominal hernia in the right lower  quadrant abdominal wall containing fat and a single loop of small  bowel leading to a transition point with upstream mildly dilated loops  of small bowel concerning for partial obstruction. Remaining loops of  small bowel are not dilated. No pneumatosis. No portal venous gas. The  colon is decompressed. Colonic diverticulosis. No associated CT  findings of acute diverticulitis. Moderate sliding hiatal hernia.  Otherwise decompressed stomach.    Liver, gallbladder, pancreas, spleen, adrenal glands are unremarkable.  No renal stones or hydronephrosis bilaterally. Suggestion of  parapelvic cysts bilaterally, left greater than right. Unremarkable  urinary bladder. Pelvic phleboliths. Prior hysterectomy. No adnexal  masses. No free fluid. No free air. No inguinal lymphadenopathy. No  suspicious pelvic lymph nodes. No abdominal aortic aneurysm. Fat  stranding along the mesenteric root with associated prominent  mesenteric lymph nodes measuring up to 7 mm short axis, not enlarged  by size criteria. Small fat containing umbilical hernia.     Lung bases: No pleural effusions. Bilateral lower lobes dependent  atelectasis.    Bones: Degenerative changes of the spine. Scattered Schmorl's nodes.  Scattered intradiscal gas. Degenerative changes of bilateral SI joints  and hips. No acute osseous abnormalities.      Impression    IMPRESSION: In this patient with history of abdominal pain:   1. CT findings concerning for partial small bowel obstruction with a  transition point at the level of a ventral abdominal hernia in the  right lower quadrant abdominal wall containing fat and a single loop  of small bowel. There is upstream mild dilation of small bowel loops.  2. Colonic diverticulosis. No associated CT findings of acute  diverticulitis.  3. Moderate  sliding hiatal hernia.  4. 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    [Result: Findings concerning for small bowel obstruction due to  ventral herniation of small bowel loop.]    Finding was identified on 6/23/2018 8:29 PM.     was contacted by  at 6/23/2018 8:38 PM and verbalized understanding  of the urgent finding.     MANNY RICE MD   UA with Microscopic     Status: Abnormal    Collection Time: 06/23/18  8:55 PM   Result Value Ref Range    Color Urine Straw     Appearance Urine Clear     Glucose Urine 150 (A) NEG^Negative mg/dL    Bilirubin Urine Negative NEG^Negative    Ketones Urine 40 (A) NEG^Negative mg/dL    Specific Gravity Urine 1.019 1.003 - 1.035    Blood Urine Negative NEG^Negative    pH Urine 7.0 5.0 - 7.0 pH    Protein Albumin Urine Negative NEG^Negative mg/dL    Urobilinogen mg/dL Normal 0.0 - 2.0 mg/dL    Nitrite Urine Negative NEG^Negative    Leukocyte Esterase Urine Trace (A) NEG^Negative    Source Catheterized Urine     WBC Urine <1 0 - 5 /HPF    RBC Urine <1 0 - 2 /HPF    Bacteria Urine Few (A) NEG^Negative /HPF    Squamous Epithelial /HPF Urine <1 0 - 1 /HPF    Hyaline Casts 1 0 - 2 /LPF           Labs Ordered and Resulted from Time of ED Arrival Up to the Time of Departure from the ED   COMPREHENSIVE METABOLIC PANEL - Abnormal; Notable for the following:        Result Value    Glucose 203 (*)     All other components within normal limits   LACTIC ACID WHOLE BLOOD - Abnormal; Notable for the following:     Lactic Acid 2.7 (*)     All other components within normal limits   ROUTINE UA WITH MICROSCOPIC - Abnormal; Notable for the following:     Glucose Urine 150 (*)     Ketones Urine 40 (*)     Leukocyte Esterase Urine Trace (*)     Bacteria Urine Few (*)     All other components within normal limits   GLUCOSE BY METER - Abnormal; Notable for the following:     Glucose 185 (*)     All other components within normal limits   CBC WITH PLATELETS  DIFFERENTIAL   INR   NT PROBNP INPATIENT   LIPASE   TROPONIN I   ISTAT TROPONIN NURSING POCT   CARDIAC CONTINUOUS MONITORING   IP ASSIGN PROVIDER TEAM TO TREATMENT TEAM   VITAL SIGNS   INTAKE AND OUTPUT   NO INDWELLING URINARY CATHETER (SMITH) PRESENT OR NEEDED   BLADDER SCAN   APPLY PNEUMATIC COMPRESSION DEVICE (PCD)   TROPONIN POCT       Consults  Surgery: Re-Called (06/23/18 2053)    Assessments & Plan (with Medical Decision Making)  SBO with LLQ ventral hernia-reduced per Surgery, but lactic acid elevated to 2.7, admit to Surgery. EKG and trop neg and other labs neg. POCUS Echo withour pericardial effusion, good LVEF, no AAA.       I have reviewed the nursing notes.    I have reviewed the findings, diagnosis, plan and need for follow up with the patient.    New Prescriptions    No medications on file       Final diagnoses:   SBO (small bowel obstruction)   Ventral hernia with obstruction and without gangrene   I, Lori Roberson, am serving as a trained medical scribe to document services personally performed by Dre Kaufman MD, based on the provider's statements to me.   Dre COLEMAN MD, was physically present and have reviewed and verified the accuracy of this note documented by Lori Roberson.      6/23/2018   [facility], [place], EMERGENCY DEPARTMENT     Dre Kaufman MD  06/23/18 [id number]

## 2018-06-23 NOTE — IP AVS SNAPSHOT
[address] [address]    Phone:  [phone]                                       After Visit Summary   6/23/2018    Mackenzie Trinidad    MRN: [id number]           After Visit Summary Signature Page     I have received my discharge instructions, and my questions have been answered. I have discussed any challenges I see with this plan with the nurse or doctor.    ..........................................................................................................................................  Patient/Patient Representative Signature      ..........................................................................................................................................  Patient Representative Print Name and Relationship to Patient    ..................................................               ................................................  Date                                            Time    ..........................................................................................................................................  Reviewed by Signature/Title    ...................................................              ..............................................  Date                                                            Time

## 2018-06-23 NOTE — IP AVS SNAPSHOT
MRN:[id number]                      After Visit Summary   6/23/2018    Mackenzie Trinidad    MRN: [id number]           Thank you!     Thank you for choosing [facility] for your care. Our goal is always to provide you with excellent care. Hearing back from our patients is one way we can continue to improve our services. Please take a few minutes to complete the written survey that you may receive in the mail after you visit with us. Thank you!        Patient Information     Date Of Birth          1945        Designated Caregiver       Most Recent Value    Caregiver    Will someone help with your care after discharge? yes    Name of designated caregiver Abelardo Trinidad    Phone number of caregiver Home [phone], Cell [phone]    Caregiver address [address] [place] [address]      About your hospital stay     You were admitted on:  June 23, 2018 You last received care in the:  [address] [facility]    You were discharged on:  June 26, 2018        Reason for your hospital stay       S/p Ventral Hernia Repair                  Who to Call     For medical emergencies, please call 911.  For non-urgent questions about your medical care, please call your primary care provider or clinic, [phone]  For questions related to your surgery, please call your surgery clinic        Attending Provider     Provider Specialty    Dre Kaufman MD Internal Medicine    Bo Cassidy MD Surgery       Primary Care Provider Office Phone # Fax #    Dorota Art [phone]      After Care Instructions     Activity       Your activity upon discharge: activity as tolerated  No Lifting greater than 20lbs for 6 weeks            Diet       Follow this diet upon discharge: Orders Placed This Encounter      Moderate Consistent CHO Diet            Discharge Instructions       Discharge Instructions  Activity  - No strenuous exercise for 3-4 weeks  - No lifting, pushing,  pulling more than 20 pounds for 6 weeks  - Do not strain with bowel movements  - Do not drive until you can press the brake pedal quickly and fully without pain  - Do not operate a motor vehicle while taking narcotic pain medications    Incisions  - You may remove wound dressing 24 hours after surgery  - You may shower and get incisions wet starting 24 hrs after surgery  - Steri-strips will fall off on their own in approximately 7-10 days  - Ok to remove primapore dressings tomorrow    Drain Care  - You do not have a drain.  Specific care/instructions:  Not Applicable    Medications  - Do not take any additional Tylenol (acetaminophen) while using a narcotic pain medication which includes acetaminophen  - Do not take more than 4,000mg of acetaminophen in any 24 hour period, as this can cause liver damage  - Take stool softeners such as Senna or Miralax while you are using narcotics, but stop if you develop diarrhea  - Wean yourself off of narcotic pain medications    Follow-Up:  - Call your primary care provider to touch base regarding your recent admission.     - Call or return sooner than your regularly scheduled visit if you develop any of the following: fever >101.5, uncontrolled pain, uncontrolled nausea or vomiting, as well as increased redness, swelling, or drainage from your wound.   -  A nurse from the General Surgery Clinic will contact you 24 hours, or next business day, after your discharge from the hospital.  If you have questions or to schedule a follow up appointment please call the General Surgery Clinic at [phone].  Call [phone] and ask to speak with the Surgery resident on call if you are having troubles in the evenings, at night, or on the weekend.  -  If you are receiving home care please inform your home care nurse of our contact number.                  Follow-up Appointments     Adult [facility] Follow-up and recommended labs and tests       Follow up with General Surgery Clinic via  "telephone. Pending clinical improvement, may schedule a follow up appointment.    Appointments on [place] and/or [facility] (with [facility] or [facility] provider or service). Call [phone] if you haven't heard regarding these appointments within 7 days of discharge.                  Pending Results     No orders found from 6/21/2018 to 6/24/2018.            Statement of Approval     Ordered          06/26/18 0823  I have reviewed and agree with all the recommendations and orders detailed in this document.  EFFECTIVE NOW     Approved and electronically signed by:  Júnior Vines MD             Admission Information     Date & Time Provider Department Dept. Phone    6/23/2018 Bo Cassidy MD [address] [facility] [place] [phone]      Your Vitals Were     Blood Pressure Pulse Temperature Respirations Height Pulse Oximetry    131/68 (BP Location: Right arm) 86 95.9  F (35.5  C) (Oral) 18 1.651 m (5' 5\") 96%      MyChart Information     [facility] gives you secure access to your electronic health record. If you see a primary care provider, you can also send messages to your care team and make appointments. If you have questions, please call your primary care clinic.  If you do not have a primary care provider, please call [phone] and they will assist you.        Care EveryWhere ID     This is your Care EveryWhere ID. This could be used by other organizations to access your [facility] medical records  [id number]        Equal Access to Services     JENNI DAY : Hadii patricia moody Sosabra, waaxda luqadaha, qaybta kaalmada mariela ragsdale . So [facility] [phone].    ATENCIÓN: Si habla español, tiene a proctor disposición servicios gratuitos de asistencia lingüística. Llame al [phone].    We comply with applicable federal civil rights laws and [place] laws. We do not discriminate on the basis of race, color, national origin, age, disability, sex, sexual " orientation, or gender identity.               Review of your medicines      START taking        Dose / Directions    oxyCODONE IR 5 MG tablet   Commonly known as:  ROXICODONE        Dose:  5-10 mg   Take 1-2 tablets (5-10 mg) by mouth every 6 hours as needed for moderate to severe pain   Quantity:  15 tablet   Refills:  0         CONTINUE these medicines which may have CHANGED, or have new prescriptions. If we are uncertain of the size of tablets/capsules you have at home, strength may be listed as something that might have changed.        Dose / Directions    pravastatin 40 MG tablet   Commonly known as:  PRAVACHOL   This may have changed:    - how much to take  - how to take this  - when to take this  - additional instructions   Used for:  Hyperlipidemia LDL goal <100        Dose:  40 mg   Take 1 tablet (40 mg) by mouth daily   Quantity:  30 tablet   Refills:  0         CONTINUE these medicines which have NOT CHANGED        Dose / Directions    ACCU-CHEK JULIO CÉSAR PLUS test strip   Used for:  Type 2 diabetes, HbA1c goal < 7% (H)   Generic drug:  blood glucose monitoring        TWICE DAILY   Quantity:  200 strip   Refills:  3       aspirin 81 MG tablet        Take  by mouth daily.   Refills:  0       blood glucose monitoring lancets   Used for:  Diabetes mellitus, type 2 (H)        Dose:  1 each   1 each by Lancet route 2 times daily   Quantity:  100 each   Refills:  prn       CALCIUM 500 + D PO        1 tablet twice daily   Refills:  0       FLAX SEED OIL PO        twice daily   Refills:  0       losartan 50 MG tablet   Commonly known as:  COZAAR   Used for:  Tinnitus, right        Dose:  50 mg   Take 1 tablet (50 mg) by mouth daily   Quantity:  90 tablet   Refills:  3       metFORMIN 500 MG 24 hr tablet   Commonly known as:  GLUCOPHAGE-XR   Used for:  Type 2 diabetes mellitus with hyperlipidemia (H)        Dose:  1000 mg   Take 2 tablets (1,000 mg) by mouth 2 times daily (with meals)   Quantity:  360 tablet    Refills:  3            Where to get your medicines      Some of these will need a paper prescription and others can be bought over the counter. Ask your nurse if you have questions.     Bring a paper prescription for each of these medications     oxyCODONE IR 5 MG tablet       You don't need a prescription for these medications     pravastatin 40 MG tablet                Protect others around you: Learn how to safely use, store and throw away your medicines at www.disposemymeds.org.        Information about OPIOIDS     PRESCRIPTION OPIOIDS: WHAT YOU NEED TO KNOW   We gave you an opioid (narcotic) pain medicine. It is important to manage your pain, but opioids are not always the best choice. You should first try all the other options your care team gave you. Take this medicine for as short a time (and as few doses) as possible.     These medicines have risks:    DO NOT drive when on new or higher doses of pain medicine. These medicines can affect your alertness and reaction times, and you could be arrested for driving under the influence (DUI). If you need to use opioids long-term, talk to your care team about driving.    DO NOT operate heave machinery    DO NOT do any other dangerous activities while taking these medicines.     DO NOT drink any alcohol while taking these medicines.      If the opioid prescribed includes acetaminophen, DO NOT take with any other medicines that contain acetaminophen. Read all labels carefully. Look for the word  acetaminophen  or  Tylenol.  Ask your pharmacist if you have questions or are unsure.    You can get addicted to pain medicines, especially if you have a history of addiction (chemical, alcohol or substance dependence). Talk to your care team about ways to reduce this risk.    Store your pills in a secure place, locked if possible. We will not replace any lost or stolen medicine. If you don t finish your medicine, please throw away (dispose) as directed by your pharmacist.  The [place] Pollution Control Agency has more information about safe disposal: https://www.pca.[facility].mn.us/living-green/managing-unwanted-medications.     All opioids tend to cause constipation. Drink plenty of water and eat foods that have a lot of fiber, such as fruits, vegetables, prune juice, apple juice and high-fiber cereal. Take a laxative (Miralax, milk of magnesia, Colace, Senna) if you don t move your bowels at least every other day.              Medication List: This is a list of all your medications and when to take them. Check marks below indicate your daily home schedule. Keep this list as a reference.      Medications           Morning Afternoon Evening Bedtime As Needed    ACCU-CHEK JULIO CÉSAR PLUS test strip   TWICE DAILY   Generic drug:  blood glucose monitoring                                aspirin 81 MG tablet   Take  by mouth daily.                                blood glucose monitoring lancets   1 each by Lancet route 2 times daily                                CALCIUM 500 + D PO   1 tablet twice daily                                FLAX SEED OIL PO   twice daily                                losartan 50 MG tablet   Commonly known as:  COZAAR   Take 1 tablet (50 mg) by mouth daily   Last time this was given:  50 mg on 6/26/2018  8:46 AM                                metFORMIN 500 MG 24 hr tablet   Commonly known as:  GLUCOPHAGE-XR   Take 2 tablets (1,000 mg) by mouth 2 times daily (with meals)   Last time this was given:  1,000 mg on 6/26/2018  8:46 AM                                oxyCODONE IR 5 MG tablet   Commonly known as:  ROXICODONE   Take 1-2 tablets (5-10 mg) by mouth every 6 hours as needed for moderate to severe pain   Last time this was given:  5 mg on 6/25/2018  7:46 PM                                pravastatin 40 MG tablet   Commonly known as:  PRAVACHOL   Take 1 tablet (40 mg) by mouth daily   Last time this was given:  40 mg on 6/25/2018  7:46 PM

## 2018-06-24 LAB
ANION GAP SERPL CALCULATED.3IONS-SCNC: 9 MMOL/L (ref 3–14)
BUN SERPL-MCNC: 8 MG/DL (ref 7–30)
CALCIUM SERPL-MCNC: 7.8 MG/DL (ref 8.5–10.1)
CHLORIDE SERPL-SCNC: 104 MMOL/L (ref 94–109)
CO2 SERPL-SCNC: 26 MMOL/L (ref 20–32)
CREAT SERPL-MCNC: 0.52 MG/DL (ref 0.52–1.04)
ERYTHROCYTE [DISTWIDTH] IN BLOOD BY AUTOMATED COUNT: 13.2 % (ref 10–15)
GFR SERPL CREATININE-BSD FRML MDRD: >90 ML/MIN/1.7M2
GLUCOSE SERPL-MCNC: 170 MG/DL (ref 70–99)
HCT VFR BLD AUTO: 35.7 % (ref 35–47)
HGB BLD-MCNC: 12 G/DL (ref 11.7–15.7)
LACTATE BLD-SCNC: 1.1 MMOL/L (ref 0.7–2)
MCH RBC QN AUTO: 30.2 PG (ref 26.5–33)
MCHC RBC AUTO-ENTMCNC: 33.6 G/DL (ref 31.5–36.5)
MCV RBC AUTO: 90 FL (ref 78–100)
PLATELET # BLD AUTO: 208 10E9/L (ref 150–450)
POTASSIUM SERPL-SCNC: 3.6 MMOL/L (ref 3.4–5.3)
RBC # BLD AUTO: 3.97 10E12/L (ref 3.8–5.2)
SODIUM SERPL-SCNC: 139 MMOL/L (ref 133–144)
WBC # BLD AUTO: 7.2 10E9/L (ref 4–11)

## 2018-06-24 PROCEDURE — 12000008 ZZH R&B INTERMEDIATE UMMC

## 2018-06-24 PROCEDURE — 25000128 H RX IP 250 OP 636: Performed by: SURGERY

## 2018-06-24 PROCEDURE — 36415 COLL VENOUS BLD VENIPUNCTURE: CPT | Performed by: SURGERY

## 2018-06-24 PROCEDURE — 96361 HYDRATE IV INFUSION ADD-ON: CPT | Performed by: INTERNAL MEDICINE

## 2018-06-24 PROCEDURE — 83605 ASSAY OF LACTIC ACID: CPT | Performed by: SURGERY

## 2018-06-24 PROCEDURE — 80048 BASIC METABOLIC PNL TOTAL CA: CPT | Performed by: SURGERY

## 2018-06-24 PROCEDURE — 85027 COMPLETE CBC AUTOMATED: CPT | Performed by: SURGERY

## 2018-06-24 RX ORDER — CEFAZOLIN SODIUM 1 G/3ML
1 INJECTION, POWDER, FOR SOLUTION INTRAMUSCULAR; INTRAVENOUS SEE ADMIN INSTRUCTIONS
Status: DISCONTINUED | OUTPATIENT
Start: 2018-06-24 | End: 2018-06-25 | Stop reason: HOSPADM

## 2018-06-24 RX ORDER — CEFAZOLIN SODIUM 2 G/100ML
2 INJECTION, SOLUTION INTRAVENOUS
Status: COMPLETED | OUTPATIENT
Start: 2018-06-24 | End: 2018-06-25

## 2018-06-24 RX ADMIN — SODIUM CHLORIDE, POTASSIUM CHLORIDE, SODIUM LACTATE AND CALCIUM CHLORIDE: 600; 310; 30; 20 INJECTION, SOLUTION INTRAVENOUS at 02:50

## 2018-06-24 RX ADMIN — SODIUM CHLORIDE, POTASSIUM CHLORIDE, SODIUM LACTATE AND CALCIUM CHLORIDE: 600; 310; 30; 20 INJECTION, SOLUTION INTRAVENOUS at 00:17

## 2018-06-24 RX ADMIN — SODIUM CHLORIDE, POTASSIUM CHLORIDE, SODIUM LACTATE AND CALCIUM CHLORIDE 500 ML: 600; 310; 30; 20 INJECTION, SOLUTION INTRAVENOUS at 00:17

## 2018-06-24 ASSESSMENT — ACTIVITIES OF DAILY LIVING (ADL)
BATHING: 0-->INDEPENDENT
COGNITION: 0 - NO COGNITION ISSUES REPORTED
TRANSFERRING: 0-->INDEPENDENT
SWALLOWING: 0-->SWALLOWS FOODS/LIQUIDS WITHOUT DIFFICULTY
RETIRED_COMMUNICATION: 0-->UNDERSTANDS/COMMUNICATES WITHOUT DIFFICULTY
DRESS: 0-->INDEPENDENT
AMBULATION: 0-->INDEPENDENT
FALL_HISTORY_WITHIN_LAST_SIX_MONTHS: NO
TOILETING: 0-->INDEPENDENT
RETIRED_EATING: 0-->INDEPENDENT

## 2018-06-24 NOTE — H&P
History and Physical     Mackenzie Trinidad MRN# [id number]   YOB: 1945 Age: 73 year old      Date of Admission:  6/23/2018        Chief Complaint:   CC: abdominal pain         History of Present Illnes:     73F with PMH of DM2 who was at the giddy game and suddenly felt nauseated, had emesis and diaphoresis. Came to ED, vss, lactate 2.7, normal white count. CT with ventral abdominal wall hernia with bowel in it causing small bowel obstruction with dilated proximal and decompressed distal bowel. Has a remote history of appendectomy at age 19, hernia repair in RLQ, unsure if inguinal, 40 years ago, unsure if mesh was used. States she has a hernia that comes and goes in that area. No prior episodes of obstruction.    Past Medical History:  Past Medical History:   Diagnosis Date     Acute pharyngitis      Gynecological examination      Need for prophylactic hormone replacement therapy (postmenopausal)      PONV (postoperative nausea and vomiting)        Past Surgical History:  Past Surgical History:   Procedure Laterality Date     ARTHRODESIS FOOT Right 10/23/2014    Procedure: ARTHRODESIS FOOT;  Surgeon: Mauri Gomez DPM;  Location: [facility] OR     COLONOSCOPY  10/10/2007    Repeat in 5 years     SURGICAL HISTORY OF -   1996    Hysterectomy     SURGICAL HISTORY OF -   3/2002    Right hammer toe/bunionectomy       Allergies:     Allergies   Allergen Reactions     Nkda [No Known Drug Allergies]        Medications:    No current facility-administered medications on file prior to encounter.   Current Outpatient Prescriptions on File Prior to Encounter:  aspirin 81 MG tablet Take  by mouth daily.   metFORMIN (GLUCOPHAGE-XR) 500 MG 24 hr tablet Take 2 tablets (1,000 mg) by mouth 2 times daily (with meals)   ACCU-CHEK JULIO CÉSAR PLUS test strip TWICE DAILY   CALCIUM 500 + D OR 1 tablet twice daily   FLAX SEED OIL OR twice daily   FREESTYLE LANCETS MISC 1 each by Lancet route 2 times daily    losartan (COZAAR) 50 MG tablet Take 1 tablet (50 mg) by mouth daily   pravastatin (PRAVACHOL) 40 MG tablet TAKE ONE TABLET EVERY DAY       Social History:  Social History     Social History     Marital status:      Spouse name: Abelardo Trinidad     Number of children: 4     Years of education: 14     Occupational History      [employer]     Social History Main Topics     Smoking status: Former Smoker     Quit date: 1/1/1971     Smokeless tobacco: Never Used     Alcohol use Yes      Comment: social     Drug use: No     Sexual activity: Yes     Partners: Male     Birth control/ protection: Surgical      Comment: hysterectomy     Other Topics Concern     Caffeine Concern No     2 cups per day     Special Diet Yes     Low Fat     Exercise Yes     Parent/Sibling W/ Cabg, Mi Or Angioplasty Before 65f 55m? No     Social History Narrative       Family History:  Family History   Problem Relation Age of Onset     Hypertension Mother      Osteoperosis Mother      HEART DISEASE Mother 94     small heart attack     Cancer Father      bone cancer     Hypertension Brother      Lipids Sister      Diabetes Sister      diet controlled     Cancer - colorectal Other      Hypertension Brother      Diabetes Brother      Lipids Brother      Cancer - colorectal Brother      Lipids Brother      Hypertension Sister      Asthma No family hx of      C.A.D. No family hx of      Cerebrovascular Disease No family hx of      Breast Cancer No family hx of      Prostate Cancer No family hx of        ROS:  The remainder of the complete ROS was negative unless noted in the HPI.    Exam:  BP (!) 161/93  Pulse 57  Temp 97.7  F (36.5  C)  Resp 20  SpO2 95%  General: Alert, interactive, NAD  HEENT: AT/NC, sclera anicteric, PERRL, EOMI, OP clear with MMM  Neck: Supple, no JVD or cervical LAD  Resp: NLB on RA  Cardiac: regular rate and rhythm   Abdomen: Soft, nontender, nondistended. RLQ bulge, reduced at  bedside  Extremities: No LE edema  Skin: Warm and dry, no jaundice or rash  Neuro: Alert & oriented x 3, Cns 2-12 intact, moves all extremities equally    Labs:  Lactate 2.7  WBC 6.1  Hg 13.4    Imaging:  CT abd pelvis with contrast:  IMPRESSION: In this patient with history of abdominal pain:   1. CT findings concerning for partial small bowel obstruction with a  transition point at the level of a ventral abdominal hernia in the  right lower quadrant abdominal wall containing fat and a single loop  of small bowel. There is upstream mild dilation of small bowel loops.  2. Colonic diverticulosis. No associated CT findings of acute  diverticulitis.  3. Moderate sliding hiatal hernia.  4. 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  Assessment/ Plan:   73F with small bowel obstruction from incarcerated ventral abdominal wall hernia, reduced at bedside.     -admit to general surgery  -NPO, IVF  -repeat labs in AM  -will decide on operative plan depending on patient status, inpatient vs elective     Discussed with chief resident Dr. Davis and attending Dr. Cassidy.    Kelsi Griffin MD PGY2  General Surgery

## 2018-06-24 NOTE — PLAN OF CARE
Problem: Patient Care Overview  Goal: Plan of Care/Patient Progress Review  Outcome: No Change  Arrived from ER at 0258. Alert and oriented x 4. Denies pain and nausea. Admission profile completed. Ambulated to the BR upon arrival and voided. NPO except for medications.

## 2018-06-24 NOTE — PLAN OF CARE
Problem: Patient Care Overview  Goal: Plan of Care/Patient Progress Review  Afebrile, vss,sats 95-98%RA, denies pain. Abdomen rounded, soft/non-tender, passing gas no stool, No N/V. Abdominal binder in  Place. Tolerating clears, plan for OR tomorrow. Cont with POC

## 2018-06-24 NOTE — PROGRESS NOTES
General Surgery Progress Note  06/24/2018    Stable overnight. Pain much improved. No n/v. Good UOP.    /69 (BP Location: Right arm)  Pulse 57  Temp 97.5  F (36.4  C) (Oral)  Resp 16  SpO2 92%        PE  NAD  NLB on RA  Abd soft, nontender, nondistended, soft hernia palpated in RLQ    Labs  WBC 7.2  Hg 12  Plt 208  Lactic acid 1.1    A/P: 73F with abdominal wall ventral hernia causing small bowel obstruction. Reduced at bedside yesterday. Clinically stable with no signs of compromised bowel.    -clears today  -plan for operative repair tomorrow AM, sooner if becomes symptomatic  -NPO at midnight  -abdominal binder    Seen with chief Dr. Davis who discussed with Dr. Cassidy.    Kelsi Griffin MD PGY2  General Surgery

## 2018-06-24 NOTE — ED NOTES
[facility], [place]   ED Nurse to Floor Handoff     Mackenzie Trinidad is a 73 year old female who speaks English and lives with a spouse,  in a home  They arrived in the ED by car from home    ED Chief Complaint: Abdominal Pain    ED Dx;   Final diagnoses:   SBO (small bowel obstruction)   Ventral hernia with obstruction and without gangrene         Needed?: No    Allergies:   Allergies   Allergen Reactions     Nkda [No Known Drug Allergies]    .  Past Medical Hx:   Past Medical History:   Diagnosis Date     Acute pharyngitis      Gynecological examination      Need for prophylactic hormone replacement therapy (postmenopausal)      PONV (postoperative nausea and vomiting)       Baseline Mental status: WDL  Current Mental Status changes: at basesline    Infection present or suspected this encounter: no  Sepsis suspected: No  Isolation type: No active isolations     Activity level - Baseline/Home:  Independent  Activity Level - Current:   Stand with Assist    Bariatric equipment needed?: No    In the ED these meds were given:   Medications   naloxone (NARCAN) injection 0.1-0.4 mg (not administered)   melatonin tablet 1 mg (not administered)   acetaminophen (TYLENOL) tablet 650 mg (not administered)   ondansetron (ZOFRAN-ODT) ODT tab 4 mg (not administered)     Or   ondansetron (ZOFRAN) injection 4 mg (not administered)   lactated ringers infusion (not administered)   lactated ringers BOLUS 500 mL (not administered)   ondansetron (ZOFRAN) injection 4 mg (4 mg Intravenous Given 6/23/18 1828)   HYDROmorphone (PF) (DILAUDID) injection 0.5 mg (0.5 mg Intravenous Given 6/23/18 1845)   metoclopramide (REGLAN) injection 5 mg (5 mg Intravenous Given 6/23/18 1846)   0.9% sodium chloride BOLUS (0 mLs Intravenous Stopped 6/23/18 2036)   HYDROmorphone (PF) (DILAUDID) injection 0.5 mg (0.5 mg Intravenous Given 6/23/18 1918)   iopamidol (ISOVUE-370) solution 112 mL (112 mLs Intravenous Given  6/23/18 2009)   sodium chloride 0.9 % bag 500mL for CT scan flush use (78 mLs Intravenous Given 6/23/18 2009)   HYDROmorphone (PF) (DILAUDID) injection 0.5 mg (0.5 mg Intravenous Given 6/23/18 6732)       Drips running?  Yes    Home pump  No    Current LDAs  Peripheral IV 06/23/18 Left Upper forearm (Active)   Number of days:0       Incision/Surgical Site 10/23/14 Right Foot (Active)   Number of days:1339       Labs results:   Labs Ordered and Resulted from Time of ED Arrival Up to the Time of Departure from the ED   COMPREHENSIVE METABOLIC PANEL - Abnormal; Notable for the following:        Result Value    Glucose 203 (*)     All other components within normal limits   LACTIC ACID WHOLE BLOOD - Abnormal; Notable for the following:     Lactic Acid 2.7 (*)     All other components within normal limits   ROUTINE UA WITH MICROSCOPIC - Abnormal; Notable for the following:     Glucose Urine 150 (*)     Ketones Urine 40 (*)     Leukocyte Esterase Urine Trace (*)     Bacteria Urine Few (*)     All other components within normal limits   GLUCOSE BY METER - Abnormal; Notable for the following:     Glucose 185 (*)     All other components within normal limits   CBC WITH PLATELETS DIFFERENTIAL   INR   NT PROBNP INPATIENT   LIPASE   TROPONIN I   ISTAT TROPONIN NURSING POCT   CARDIAC CONTINUOUS MONITORING   IP ASSIGN PROVIDER TEAM TO TREATMENT TEAM   VITAL SIGNS   INTAKE AND OUTPUT   NO INDWELLING URINARY CATHETER (SMITH) PRESENT OR NEEDED   BLADDER SCAN   APPLY PNEUMATIC COMPRESSION DEVICE (PCD)   TROPONIN POCT       Imaging Studies:   Recent Results (from the past 24 hour(s))   POC US ECHO LIMITED    Impression    Limited Bedside Cardiac Ultrasound, performed and interpreted by me.   Indication: Chest Pain.  Parasternal long axis, parasternal short axis and apical 4 chamber views were acquired.   Image quality was satisfactory.    Findings:    Global left ventricular function appears intact.  Chambers do not appear  dilated.  There is no evidence of free fluid within the pericardium.    IMPRESSION: Grossly normal left ventricular function and chamber size.  No pericardial effusion.    [facility] Procedure Note      Limited Bedside ED Aorta Ultrasound:    PROCEDURE: PERFORMED BY: Dr. Dre Kaufman MD  INDICATIONS:  Abdominal Pain    PROBE:  Low frequency convex probe  BODY LOCATION: Abdomen  FINDINGS:  The ultrasound was performed using longitudinal and transverse views.   Normal: Abdominal aorta < 4 cm.  MEASUREMENT:  1.9 cm   No evidence of free fluid in hepatorenal (Morison s pouch), perisplenic, or and pelvic areas. No evidence of pericardial effusion.  INTERPRETATION:  The evaluation of the aorta was of normal caliber (ie < 4cm in the transverse/longitudinal views) without evidence of aneurysm or dilation.  Aorta visualized in entirety from insertion into the intra-abdominal cavity to bifurcation into iliac vessels. There was no abdominal free fluid.  IMAGE DOCUMENTATION: Images were archived to PACs system. .    XR Chest Port 1 View    Narrative    XR CHEST PORT 1 VW  6/23/2018 6:54 PM      HISTORY: cp;     COMPARISON: None    FINDINGS: The cardiomediastinal silhouette and pulmonary vasculature  are within normal limits. No focal pulmonary opacity. No pleural  effusion or pneumothorax are identified.      Impression    IMPRESSION: No acute cardiopulmonary abnormality.    I have personally reviewed the examination and initial interpretation  and I agree with the findings.    MANNY RICE MD   CT Abdomen Pelvis w Contrast    Narrative    EXAMINATION: CT abdomen and pelvis, 6/23/2018 8:31 PM    TECHNIQUE:  Helical CT images from the lung bases through the  symphysis pubis were obtained with IV contrast.    COMPARISON: No similar study.    HISTORY: Abdominal pain.    FINDINGS:    Abdomen and pelvis: Ventral abdominal hernia in the right lower  quadrant abdominal wall containing fat and a single loop of  small  bowel leading to a transition point with upstream mildly dilated loops  of small bowel concerning for partial obstruction. Remaining loops of  small bowel are not dilated. No pneumatosis. No portal venous gas. The  colon is decompressed. Colonic diverticulosis. No associated CT  findings of acute diverticulitis. Moderate sliding hiatal hernia.  Otherwise decompressed stomach.    Liver, gallbladder, pancreas, spleen, adrenal glands are unremarkable.  No renal stones or hydronephrosis bilaterally. Suggestion of  parapelvic cysts bilaterally, left greater than right. Unremarkable  urinary bladder. Pelvic phleboliths. Prior hysterectomy. No adnexal  masses. No free fluid. No free air. No inguinal lymphadenopathy. No  suspicious pelvic lymph nodes. No abdominal aortic aneurysm. Fat  stranding along the mesenteric root with associated prominent  mesenteric lymph nodes measuring up to 7 mm short axis, not enlarged  by size criteria. Small fat containing umbilical hernia.     Lung bases: No pleural effusions. Bilateral lower lobes dependent  atelectasis.    Bones: Degenerative changes of the spine. Scattered Schmorl's nodes.  Scattered intradiscal gas. Degenerative changes of bilateral SI joints  and hips. No acute osseous abnormalities.      Impression    IMPRESSION: In this patient with history of abdominal pain:   1. CT findings concerning for partial small bowel obstruction with a  transition point at the level of a ventral abdominal hernia in the  right lower quadrant abdominal wall containing fat and a single loop  of small bowel. There is upstream mild dilation of small bowel loops.  2. Colonic diverticulosis. No associated CT findings of acute  diverticulitis.  3. Moderate sliding hiatal hernia.  4. 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    [Result: Findings concerning for small bowel obstruction due to  ventral herniation of small bowel loop.]    Finding was identified  on 6/23/2018 8:29 PM.     was contacted by  at 6/23/2018 8:38 PM and verbalized understanding  of the urgent finding.     MANNY RICE MD       Recent vital signs:   /81  Pulse 57  Temp 97.7  F (36.5  C)  Resp 15  SpO2 95%    Cardiac Rhythm: Normal Sinus  Pt needs tele? No  Skin/wound Issues: None    Code Status: Full Code    Pain control: good    Nausea control: good    Abnormal labs/tests/findings requiring intervention: see epic    Family present during ED course? Yes   Family Comments/Social Situation comments: n/a    Tasks needing completion: None      [phone] [facility]

## 2018-06-25 ENCOUNTER — ANESTHESIA (OUTPATIENT)
Dept: SURGERY | Facility: CLINIC | Age: 73
DRG: 355 | End: 2018-06-25
Payer: MEDICARE

## 2018-06-25 ENCOUNTER — ANESTHESIA EVENT (OUTPATIENT)
Dept: SURGERY | Facility: CLINIC | Age: 73
DRG: 355 | End: 2018-06-25
Payer: MEDICARE

## 2018-06-25 LAB
GLUCOSE BLDC GLUCOMTR-MCNC: 154 MG/DL (ref 70–99)
GLUCOSE BLDC GLUCOMTR-MCNC: 190 MG/DL (ref 70–99)
GLUCOSE BLDC GLUCOMTR-MCNC: 253 MG/DL (ref 70–99)
GLUCOSE BLDC GLUCOMTR-MCNC: 255 MG/DL (ref 70–99)
GLUCOSE BLDC GLUCOMTR-MCNC: 265 MG/DL (ref 70–99)
GLUCOSE BLDC GLUCOMTR-MCNC: 270 MG/DL (ref 70–99)
GLUCOSE BLDC GLUCOMTR-MCNC: 291 MG/DL (ref 70–99)
INTERPRETATION ECG - MUSE: NORMAL

## 2018-06-25 PROCEDURE — 36000057 ZZH SURGERY LEVEL 3 1ST 30 MIN - UMMC: Performed by: SURGERY

## 2018-06-25 PROCEDURE — A9270 NON-COVERED ITEM OR SERVICE: HCPCS | Mod: GY | Performed by: SURGERY

## 2018-06-25 PROCEDURE — 36000059 ZZH SURGERY LEVEL 3 EA 15 ADDTL MIN UMMC: Performed by: SURGERY

## 2018-06-25 PROCEDURE — 40000170 ZZH STATISTIC PRE-PROCEDURE ASSESSMENT II: Performed by: SURGERY

## 2018-06-25 PROCEDURE — 25000132 ZZH RX MED GY IP 250 OP 250 PS 637: Mod: GY | Performed by: SURGERY

## 2018-06-25 PROCEDURE — 37000009 ZZH ANESTHESIA TECHNICAL FEE, EACH ADDTL 15 MIN: Performed by: SURGERY

## 2018-06-25 PROCEDURE — 0WUF4JZ SUPPLEMENT ABDOMINAL WALL WITH SYNTHETIC SUBSTITUTE, PERCUTANEOUS ENDOSCOPIC APPROACH: ICD-10-PCS | Performed by: SURGERY

## 2018-06-25 PROCEDURE — 71000014 ZZH RECOVERY PHASE 1 LEVEL 2 FIRST HR: Performed by: SURGERY

## 2018-06-25 PROCEDURE — 25000128 H RX IP 250 OP 636: Performed by: NURSE ANESTHETIST, CERTIFIED REGISTERED

## 2018-06-25 PROCEDURE — 40000141 ZZH STATISTIC PERIPHERAL IV START W/O US GUIDANCE

## 2018-06-25 PROCEDURE — 71000015 ZZH RECOVERY PHASE 1 LEVEL 2 EA ADDTL HR: Performed by: SURGERY

## 2018-06-25 PROCEDURE — A9270 NON-COVERED ITEM OR SERVICE: HCPCS | Mod: GY | Performed by: STUDENT IN AN ORGANIZED HEALTH CARE EDUCATION/TRAINING PROGRAM

## 2018-06-25 PROCEDURE — 25000128 H RX IP 250 OP 636: Performed by: SURGERY

## 2018-06-25 PROCEDURE — 25000128 H RX IP 250 OP 636: Performed by: ANESTHESIOLOGY

## 2018-06-25 PROCEDURE — 25000125 ZZHC RX 250: Performed by: NURSE ANESTHETIST, CERTIFIED REGISTERED

## 2018-06-25 PROCEDURE — 25000128 H RX IP 250 OP 636: Performed by: STUDENT IN AN ORGANIZED HEALTH CARE EDUCATION/TRAINING PROGRAM

## 2018-06-25 PROCEDURE — C1781 MESH (IMPLANTABLE): HCPCS | Performed by: SURGERY

## 2018-06-25 PROCEDURE — 25000566 ZZH SEVOFLURANE, EA 15 MIN: Performed by: SURGERY

## 2018-06-25 PROCEDURE — 00000146 ZZHCL STATISTIC GLUCOSE BY METER IP

## 2018-06-25 PROCEDURE — C9399 UNCLASSIFIED DRUGS OR BIOLOG: HCPCS | Performed by: NURSE ANESTHETIST, CERTIFIED REGISTERED

## 2018-06-25 PROCEDURE — 27210794 ZZH OR GENERAL SUPPLY STERILE: Performed by: SURGERY

## 2018-06-25 PROCEDURE — 25000131 ZZH RX MED GY IP 250 OP 636 PS 637: Mod: GY | Performed by: ANESTHESIOLOGY

## 2018-06-25 PROCEDURE — 25000132 ZZH RX MED GY IP 250 OP 250 PS 637: Mod: GY | Performed by: STUDENT IN AN ORGANIZED HEALTH CARE EDUCATION/TRAINING PROGRAM

## 2018-06-25 PROCEDURE — 25000125 ZZHC RX 250: Performed by: SURGERY

## 2018-06-25 PROCEDURE — 12000008 ZZH R&B INTERMEDIATE UMMC

## 2018-06-25 PROCEDURE — 37000008 ZZH ANESTHESIA TECHNICAL FEE, 1ST 30 MIN: Performed by: SURGERY

## 2018-06-25 DEVICE — IMPLANTABLE DEVICE: Type: IMPLANTABLE DEVICE | Site: ABDOMEN | Status: FUNCTIONAL

## 2018-06-25 RX ORDER — NICOTINE POLACRILEX 4 MG
15-30 LOZENGE BUCCAL
Status: DISCONTINUED | OUTPATIENT
Start: 2018-06-25 | End: 2018-06-26 | Stop reason: HOSPADM

## 2018-06-25 RX ORDER — HYDROMORPHONE HYDROCHLORIDE 1 MG/ML
.3-.5 INJECTION, SOLUTION INTRAMUSCULAR; INTRAVENOUS; SUBCUTANEOUS EVERY 5 MIN PRN
Status: DISCONTINUED | OUTPATIENT
Start: 2018-06-25 | End: 2018-06-25 | Stop reason: HOSPADM

## 2018-06-25 RX ORDER — ASPIRIN 81 MG/1
81 TABLET, CHEWABLE ORAL DAILY
Status: DISCONTINUED | OUTPATIENT
Start: 2018-06-25 | End: 2018-06-26 | Stop reason: HOSPADM

## 2018-06-25 RX ORDER — METFORMIN HCL 500 MG
1000 TABLET, EXTENDED RELEASE 24 HR ORAL 2 TIMES DAILY WITH MEALS
Status: DISCONTINUED | OUTPATIENT
Start: 2018-06-25 | End: 2018-06-26 | Stop reason: HOSPADM

## 2018-06-25 RX ORDER — OXYCODONE HYDROCHLORIDE 5 MG/1
5-10 TABLET ORAL EVERY 4 HOURS PRN
Status: DISCONTINUED | OUTPATIENT
Start: 2018-06-25 | End: 2018-06-26 | Stop reason: HOSPADM

## 2018-06-25 RX ORDER — FENTANYL CITRATE 50 UG/ML
25-50 INJECTION, SOLUTION INTRAMUSCULAR; INTRAVENOUS
Status: DISCONTINUED | OUTPATIENT
Start: 2018-06-25 | End: 2018-06-25 | Stop reason: HOSPADM

## 2018-06-25 RX ORDER — SODIUM CHLORIDE, SODIUM LACTATE, POTASSIUM CHLORIDE, CALCIUM CHLORIDE 600; 310; 30; 20 MG/100ML; MG/100ML; MG/100ML; MG/100ML
INJECTION, SOLUTION INTRAVENOUS CONTINUOUS
Status: DISCONTINUED | OUTPATIENT
Start: 2018-06-25 | End: 2018-06-25 | Stop reason: HOSPADM

## 2018-06-25 RX ORDER — FENTANYL CITRATE 50 UG/ML
INJECTION, SOLUTION INTRAMUSCULAR; INTRAVENOUS PRN
Status: DISCONTINUED | OUTPATIENT
Start: 2018-06-25 | End: 2018-06-25

## 2018-06-25 RX ORDER — DEXAMETHASONE SODIUM PHOSPHATE 4 MG/ML
INJECTION, SOLUTION INTRA-ARTICULAR; INTRALESIONAL; INTRAMUSCULAR; INTRAVENOUS; SOFT TISSUE PRN
Status: DISCONTINUED | OUTPATIENT
Start: 2018-06-25 | End: 2018-06-25

## 2018-06-25 RX ORDER — LABETALOL HYDROCHLORIDE 5 MG/ML
20 INJECTION, SOLUTION INTRAVENOUS EVERY 4 HOURS PRN
Status: DISCONTINUED | OUTPATIENT
Start: 2018-06-25 | End: 2018-06-25

## 2018-06-25 RX ORDER — NALOXONE HYDROCHLORIDE 0.4 MG/ML
.1-.4 INJECTION, SOLUTION INTRAMUSCULAR; INTRAVENOUS; SUBCUTANEOUS
Status: ACTIVE | OUTPATIENT
Start: 2018-06-25 | End: 2018-06-26

## 2018-06-25 RX ORDER — DEXTROSE MONOHYDRATE 25 G/50ML
25-50 INJECTION, SOLUTION INTRAVENOUS
Status: DISCONTINUED | OUTPATIENT
Start: 2018-06-25 | End: 2018-06-26 | Stop reason: HOSPADM

## 2018-06-25 RX ORDER — LABETALOL HYDROCHLORIDE 5 MG/ML
10 INJECTION, SOLUTION INTRAVENOUS EVERY 4 HOURS PRN
Status: DISCONTINUED | OUTPATIENT
Start: 2018-06-25 | End: 2018-06-26 | Stop reason: HOSPADM

## 2018-06-25 RX ORDER — LOSARTAN POTASSIUM 50 MG/1
50 TABLET ORAL DAILY
Status: DISCONTINUED | OUTPATIENT
Start: 2018-06-25 | End: 2018-06-26 | Stop reason: HOSPADM

## 2018-06-25 RX ORDER — ONDANSETRON 2 MG/ML
4 INJECTION INTRAMUSCULAR; INTRAVENOUS EVERY 30 MIN PRN
Status: DISCONTINUED | OUTPATIENT
Start: 2018-06-25 | End: 2018-06-25 | Stop reason: HOSPADM

## 2018-06-25 RX ORDER — NICOTINE POLACRILEX 4 MG
15-30 LOZENGE BUCCAL
Status: DISCONTINUED | OUTPATIENT
Start: 2018-06-25 | End: 2018-06-25

## 2018-06-25 RX ORDER — ONDANSETRON 4 MG/1
4 TABLET, ORALLY DISINTEGRATING ORAL EVERY 30 MIN PRN
Status: DISCONTINUED | OUTPATIENT
Start: 2018-06-25 | End: 2018-06-25 | Stop reason: HOSPADM

## 2018-06-25 RX ORDER — DEXTROSE MONOHYDRATE 25 G/50ML
25-50 INJECTION, SOLUTION INTRAVENOUS
Status: DISCONTINUED | OUTPATIENT
Start: 2018-06-25 | End: 2018-06-25

## 2018-06-25 RX ORDER — CALCIUM CARBONATE 500 MG/1
500 TABLET, CHEWABLE ORAL DAILY PRN
Status: DISCONTINUED | OUTPATIENT
Start: 2018-06-25 | End: 2018-06-26 | Stop reason: HOSPADM

## 2018-06-25 RX ORDER — PRAVASTATIN SODIUM 20 MG
40 TABLET ORAL DAILY
Status: DISCONTINUED | OUTPATIENT
Start: 2018-06-25 | End: 2018-06-26 | Stop reason: HOSPADM

## 2018-06-25 RX ORDER — PROPOFOL 10 MG/ML
INJECTION, EMULSION INTRAVENOUS CONTINUOUS PRN
Status: DISCONTINUED | OUTPATIENT
Start: 2018-06-25 | End: 2018-06-25

## 2018-06-25 RX ORDER — PROPOFOL 10 MG/ML
INJECTION, EMULSION INTRAVENOUS PRN
Status: DISCONTINUED | OUTPATIENT
Start: 2018-06-25 | End: 2018-06-25

## 2018-06-25 RX ADMIN — OXYCODONE HYDROCHLORIDE 5 MG: 5 TABLET ORAL at 19:46

## 2018-06-25 RX ADMIN — HYDROMORPHONE HYDROCHLORIDE 0.3 MG: 1 INJECTION, SOLUTION INTRAMUSCULAR; INTRAVENOUS; SUBCUTANEOUS at 11:37

## 2018-06-25 RX ADMIN — ROCURONIUM BROMIDE 10 MG: 10 INJECTION INTRAVENOUS at 08:55

## 2018-06-25 RX ADMIN — ROCURONIUM BROMIDE 50 MG: 10 INJECTION INTRAVENOUS at 08:00

## 2018-06-25 RX ADMIN — FENTANYL CITRATE 25 MCG: 50 INJECTION INTRAMUSCULAR; INTRAVENOUS at 11:20

## 2018-06-25 RX ADMIN — ASPIRIN 81 MG CHEWABLE TABLET 81 MG: 81 TABLET CHEWABLE at 15:07

## 2018-06-25 RX ADMIN — CALCIUM CARBONATE (ANTACID) CHEW TAB 500 MG 500 MG: 500 CHEW TAB at 20:03

## 2018-06-25 RX ADMIN — SODIUM CHLORIDE, POTASSIUM CHLORIDE, SODIUM LACTATE AND CALCIUM CHLORIDE: 600; 310; 30; 20 INJECTION, SOLUTION INTRAVENOUS at 09:00

## 2018-06-25 RX ADMIN — HYDROMORPHONE HYDROCHLORIDE 0.3 MG: 1 INJECTION, SOLUTION INTRAMUSCULAR; INTRAVENOUS; SUBCUTANEOUS at 10:46

## 2018-06-25 RX ADMIN — ACETAMINOPHEN 650 MG: 325 TABLET, FILM COATED ORAL at 22:38

## 2018-06-25 RX ADMIN — PRAVASTATIN SODIUM 40 MG: 20 TABLET ORAL at 19:46

## 2018-06-25 RX ADMIN — CEFAZOLIN SODIUM 2 G: 2 INJECTION, SOLUTION INTRAVENOUS at 08:00

## 2018-06-25 RX ADMIN — SUGAMMADEX 200 MG: 100 INJECTION, SOLUTION INTRAVENOUS at 10:05

## 2018-06-25 RX ADMIN — PROPOFOL 15 MCG/KG/MIN: 10 INJECTION, EMULSION INTRAVENOUS at 08:05

## 2018-06-25 RX ADMIN — ROCURONIUM BROMIDE 20 MG: 10 INJECTION INTRAVENOUS at 08:35

## 2018-06-25 RX ADMIN — Medication 100 MG: at 07:48

## 2018-06-25 RX ADMIN — HYDROMORPHONE HYDROCHLORIDE 0.25 MG: 1 INJECTION, SOLUTION INTRAMUSCULAR; INTRAVENOUS; SUBCUTANEOUS at 09:29

## 2018-06-25 RX ADMIN — ONDANSETRON 4 MG: 2 INJECTION INTRAMUSCULAR; INTRAVENOUS at 08:00

## 2018-06-25 RX ADMIN — FENTANYL CITRATE 25 MCG: 50 INJECTION, SOLUTION INTRAMUSCULAR; INTRAVENOUS at 10:05

## 2018-06-25 RX ADMIN — HYDROMORPHONE HYDROCHLORIDE 0.5 MG: 1 INJECTION, SOLUTION INTRAMUSCULAR; INTRAVENOUS; SUBCUTANEOUS at 11:52

## 2018-06-25 RX ADMIN — ONDANSETRON 4 MG: 2 INJECTION INTRAMUSCULAR; INTRAVENOUS at 13:25

## 2018-06-25 RX ADMIN — FENTANYL CITRATE 50 MCG: 50 INJECTION, SOLUTION INTRAMUSCULAR; INTRAVENOUS at 07:38

## 2018-06-25 RX ADMIN — LOSARTAN POTASSIUM 50 MG: 50 TABLET ORAL at 15:07

## 2018-06-25 RX ADMIN — INSULIN ASPART 3 UNITS: 100 INJECTION, SOLUTION INTRAVENOUS; SUBCUTANEOUS at 11:24

## 2018-06-25 RX ADMIN — HYDROMORPHONE HYDROCHLORIDE 0.25 MG: 1 INJECTION, SOLUTION INTRAMUSCULAR; INTRAVENOUS; SUBCUTANEOUS at 10:05

## 2018-06-25 RX ADMIN — ACETAMINOPHEN 650 MG: 325 TABLET, FILM COATED ORAL at 15:09

## 2018-06-25 RX ADMIN — FENTANYL CITRATE 50 MCG: 50 INJECTION, SOLUTION INTRAMUSCULAR; INTRAVENOUS at 07:48

## 2018-06-25 RX ADMIN — FENTANYL CITRATE 50 MCG: 50 INJECTION, SOLUTION INTRAMUSCULAR; INTRAVENOUS at 08:05

## 2018-06-25 RX ADMIN — PROPOFOL 120 MG: 10 INJECTION, EMULSION INTRAVENOUS at 07:48

## 2018-06-25 RX ADMIN — SODIUM CHLORIDE, POTASSIUM CHLORIDE, SODIUM LACTATE AND CALCIUM CHLORIDE: 600; 310; 30; 20 INJECTION, SOLUTION INTRAVENOUS at 07:32

## 2018-06-25 RX ADMIN — ROCURONIUM BROMIDE 10 MG: 10 INJECTION INTRAVENOUS at 09:25

## 2018-06-25 RX ADMIN — DEXAMETHASONE SODIUM PHOSPHATE 8 MG: 4 INJECTION, SOLUTION INTRA-ARTICULAR; INTRALESIONAL; INTRAMUSCULAR; INTRAVENOUS; SOFT TISSUE at 08:00

## 2018-06-25 RX ADMIN — HYDROMORPHONE HYDROCHLORIDE 0.4 MG: 1 INJECTION, SOLUTION INTRAMUSCULAR; INTRAVENOUS; SUBCUTANEOUS at 11:07

## 2018-06-25 NOTE — OP NOTE
Operative report: Mackenzie Trinidad  MR:[id number]   Pt :1945     Procedure: Abdominal wall hernia repair    Preoperative diagnosis: Right lower quadrant abdominal wall hernia  Postoperative diagnosis: Same    Surgeon:Dr. Ibrahim  Resident: Dr. Rosemary Caraballo    EBL: 10ml    Specimens sent: none    Drains: none    Indications: Mackenzie Trinidad is a 73 year old female who presented with right lower quadrant hernia containing bowel on CT. the hernia was at least partially reduced at bedside however following a discussion of the risks and benefits of the procedure the patient consented to proceed with operative repair.  The potential for both laparoscopic and open repair reviewed with the patient prior to surgery and she consents to proceed.      Description of the procedure:The patient was brought to the operating room and positioned supine on the operating room table with her left arm tucked. After induction of general endotracheal anesthesia the patient's abdomen was prepped and draped in the usual sterile fashion.    A timeout was called and the correct patient, procedure, and surgical site were identified. A 3 cm supraumbilical incision was made and dissection was carried down to the abdominal wall fascia.  There was a small, 1 cm fascial defect noted and after overlying tissue was sufficiently cleared off we introduced a 10 mm balloon port through this hole with a plan to close it at the end of the procedure.   We carefully inspected the abdomen were readily able to visualize the aforementioned right lower quadrant hernia.  We placed 2 additional 5 mm ports; one in the left paramidline position and one in the suprapubic position.  Using a combination of graspers, and laparoscopic amandeep with cautery attached we are able to take down the attachments which kept the herniated abdominal fat within the sac.  Bleeding was controlled along the way with electrocautery.  Once the herniated contents have been  reduced into the abdomen the peritoneum was pulled down from the area surrounding the fascial defect.  Two separate hernias were identified.  One was 3 cm and a smaller 1cm adjacent one. We freed any preperitoneal fat from this defect as well.  Of note, the hernia sac was left in place as it was felt that excision of the sac would be particularly difficult because of its position and would likely cause more trauma.  It is expected that she will have a seroma at this location for some time.   A 10 cm round composite paritex mesh was introduced into the abdomen.  We proceeded to use the observer tacker to position the mesh into the right lower quadrant, being sure to include clearance of at least 3 cm in each direction from the fascial defects.  At this point it was felt that placing sutures in the superior and lateral positions of the mesh would help to ensure fixation.  As such stab incisions were made at the superior edge of the mesh and a PMI suture passer was used to introduce an 0 Prolene suture through the mesh which was then retrieved through the same stab incision.  This was repeated along the lateral margin of the mesh.  We observe attacker was used to fix the mesh along the medial, inferior, lateral, and superior edges.     Once fixation was felt to be appropriate we then turned our attention to the umbilical port.  The 10 mm trocar was removed and a single 0 Prolene suture was used in a figure-of-eight fashion with a PMI suture passer to close the umbilical defect.  This closure was airtight and hemostatic.   We proceeded to pull the peritoneum over the inferior, superior, and lateral margins of the mesh while we desufflated the abdomen.  All ports were removed and the skin was closed using 4-0 Vicryl suture.  Steri strips were placed over each incision including the 2 stab incisions.    The patient was transferred to the post operative recovery unit in stable condition. she tolerated the procedure well  and there were no immediate complications noted.    Dr. Ibrahim was present or immediately available for the entire procedure.  Rosemary Caraballo   General Surgery PGY6 - Chief Resident  Pager: [phone]

## 2018-06-25 NOTE — ANESTHESIA POSTPROCEDURE EVALUATION
Patient: Mackenzie Trinidad    Procedure(s):  LAPAROSCOPIC HERNIORRHAPHY VENTRAL  - Wound Class: I-Clean    Diagnosis:Ventral Hernia  Diagnosis Additional Information: No value filed.    Anesthesia Type:  General    Note:  Anesthesia Post Evaluation    Patient location during evaluation: PACU  Patient participation: Able to fully participate in evaluation  Level of consciousness: awake  Pain management: adequate  Airway patency: patent  Cardiovascular status: acceptable  Respiratory status: acceptable  Hydration status: acceptable  PONV: none     Anesthetic complications: None          Last vitals:  Vitals:    06/25/18 1130 06/25/18 1145 06/25/18 1200   BP: 136/77 130/74 125/67   Pulse:      Resp: 16 24 14   Temp: 37.3  C (99.1  F) 37.3  C (99.1  F)    SpO2:            Electronically Signed By: Miladys Gutierrez MD  June 25, 2018  12:15 PM

## 2018-06-25 NOTE — ANESTHESIA CARE TRANSFER NOTE
Patient: Mackenzie Trinidad    Procedure(s):  LAPAROSCOPIC HERNIORRHAPHY VENTRAL  - Wound Class: I-Clean    Diagnosis: Ventral Hernia  Diagnosis Additional Information: No value filed.    Anesthesia Type:   No value filed.     Note:  Airway :Face Mask  Patient transferred to:PACU  Comments: Pt extubated in the OR without incident or complications. Pt VSS per baseline upon arrival to the PACU. Pt has no c/o pain/N/V. Pt care report given to receiving RN.       Vitals: (Last set prior to Anesthesia Care Transfer)    CRNA VITALS  6/25/2018 0951 - 6/25/2018 1029      6/25/2018             Pulse: 88    SpO2: 97 %    Resp Rate (observed): 9                Electronically Signed By: KAMAR Nieves CRNA  June 25, 2018  10:29 AM

## 2018-06-25 NOTE — PLAN OF CARE
Problem: Patient Care Overview  Goal: Plan of Care/Patient Progress Review  Outcome: No Change  Vitals:    06/25/18 1332 06/25/18 1345 06/25/18 1415 06/25/18 1445   BP: 160/78 169/84 143/73 138/74   BP Location:   Right arm Right arm   Pulse:       Resp: 17 14 12 12   Temp:       TempSrc:       SpO2: 91% 93% 93% 93%   Height:           VSS ex elevated BP first hour on unit, MD notified. Labetolol PRN ordered. 94% on 3L.  at 1315, SSI given per orders. Pt had 50 cc emesis after sips of water. Zofran given, effective. Lap sites CDI, denies flatus, LS clear. Pt up to BR SBA voided 600 cc. L PIV removed, bleeding. Tolerated daily meds and tylenol after zofran. Cont POC.

## 2018-06-25 NOTE — PLAN OF CARE
Problem: Patient Care Overview  Goal: Plan of Care/Patient Progress Review  Outcome: Improving  Afebrile, vss,sats 95-98%RA, denies pain. Abdomen rounded, soft/non-tender, passing gas no stool, No N/V. Abdominal binder in  Place. Tolerating clears, plan for OR tomorrow, Pre-op shower completed, NPO at midnight patient aware,  cont with POC

## 2018-06-25 NOTE — ANESTHESIA PREPROCEDURE EVALUATION
ANESTHESIA PREOP EVALUATION    NPO Status: more then 6 hours    Procedure: LAPAROSCOPIC HERNIORRHAPHY VENTRAL possible open    HPI: Ventral Hernia    PMHx/PSHx/ROS:  PAST MEDICAL HISTORY:   Past Medical History:   Diagnosis Date     Acute pharyngitis      Gynecological examination      Need for prophylactic hormone replacement therapy (postmenopausal)      PONV (postoperative nausea and vomiting)        PAST SURGICAL HISTORY:   Past Surgical History:   Procedure Laterality Date     ARTHRODESIS FOOT Right 10/23/2014    Procedure: ARTHRODESIS FOOT;  Surgeon: Mauri Gomez DPM;  Location: [facility] OR     COLONOSCOPY  10/10/2007    Repeat in 5 years     SURGICAL HISTORY OF -   1996    Hysterectomy     SURGICAL HISTORY OF -   3/2002    Right hammer toe/bunionectomy       FAMILY HISTORY:   Family History   Problem Relation Age of Onset     Hypertension Mother      Osteoperosis Mother      HEART DISEASE Mother 94     small heart attack     Cancer Father      bone cancer     Hypertension Brother      Lipids Sister      Diabetes Sister      diet controlled     Cancer - colorectal Other      Hypertension Brother      Diabetes Brother      Lipids Brother      Cancer - colorectal Brother      Lipids Brother      Hypertension Sister      Asthma No family hx of      C.A.D. No family hx of      Cerebrovascular Disease No family hx of      Breast Cancer No family hx of      Prostate Cancer No family hx of          Past Anes Hx: No personal or family h/o anesthesia problems      Allergies:   Allergies   Allergen Reactions     Nkda [No Known Drug Allergies]        Meds:   Prescriptions Prior to Admission   Medication Sig Dispense Refill Last Dose     aspirin 81 MG tablet Take  by mouth daily.   6/23/2018 at Unknown time     metFORMIN (GLUCOPHAGE-XR) 500 MG 24 hr tablet Take 2 tablets (1,000 mg) by mouth 2 times daily (with meals) 360 tablet 3 6/23/2018 at Unknown time     ACCU-CHEK JULIO CÉSAR PLUS test strip TWICE DAILY 200 strip 3       CALCIUM 500 + D OR 1 tablet twice daily   Taking     FLAX SEED OIL OR twice daily   Taking     FREESTYLE LANCETS MISC 1 each by Lancet route 2 times daily 100 each prn Taking     losartan (COZAAR) 50 MG tablet Take 1 tablet (50 mg) by mouth daily 90 tablet 3      pravastatin (PRAVACHOL) 40 MG tablet TAKE ONE TABLET EVERY DAY 90 tablet 3        No current outpatient prescriptions on file.       Physical Exam:  VS: T 98.2, P 69, /83, R 18, SpO2 94%   MP2, TM distance >3FB, mouth opening adequate, full ROM, intact dentition.        BMP:  Lab Results   Component Value Date     06/24/2018      Lab Results   Component Value Date    POTASSIUM 3.6 06/24/2018     Lab Results   Component Value Date    CHLORIDE 104 06/24/2018     Lab Results   Component Value Date    LEROY 7.8 06/24/2018     Lab Results   Component Value Date    CO2 26 06/24/2018     Lab Results   Component Value Date    BUN 8 06/24/2018     Lab Results   Component Value Date    CR 0.52 06/24/2018     Lab Results   Component Value Date     06/24/2018        CBC:  Lab Results   Component Value Date    WBC 7.2 06/24/2018     Lab Results   Component Value Date    HGB 12.0 06/24/2018     Lab Results   Component Value Date    HCT 35.7 06/24/2018     Lab Results   Component Value Date     06/24/2018        Coags/Type and Screen  Lab Results   Component Value Date    INR 0.95 06/23/2018     No results found for: PT  Type and Screen:                         Anesthesia Plan      History & Physical Review  History and physical reviewed and following examination; no interval change.    ASA Status:  3 .    NPO Status:  > 8 hours    Plan for General and ETT with Intravenous induction. Maintenance will be Balanced.    PONV prophylaxis:  Ondansetron (or other 5HT-3) and Promethazine or metoclopramide       Postoperative Care  Postoperative pain management:  IV analgesics.      Consents  Anesthetic plan, risks, benefits and alternatives discussed  with:  Patient..            Miladys Gutierrez MD    6/25/2018                .

## 2018-06-25 NOTE — BRIEF OP NOTE
[facility], [place]    Brief Operative Note    Pre-operative diagnosis: Ventral Hernia  Post-operative diagnosis * No post-op diagnosis entered *  Procedure: Procedure(s):  LAPAROSCOPIC HERNIORRHAPHY VENTRAL  - Wound Class: I-Clean  Surgeon: Surgeon(s) and Role:     * Jose Ibrahim MD - Primary     * Rosemary Caraballo - Resident - Assisting  Anesthesia: General   Estimated blood loss: 10ml  Drains: None  Specimens: * No specimens in log *  Findings:   3 cm hernia opening located in the right lower quadrant, on exposure of more cephalad area, a second 1.5 cm fascial defect was noted. .  Complications: None..  Implants: 10cm round composite paritex mesh.

## 2018-06-25 NOTE — PLAN OF CARE
Problem: Patient Care Overview  Goal: Individualization & Mutuality  Outcome: No Change  Vitals:    06/24/18 1630 06/24/18 2140 06/24/18 2247 06/25/18 0500   BP: 159/86 159/78 153/81 159/83   BP Location: Right arm Right arm Right arm Right arm   Pulse:    69   Resp: 18 16 18   Temp: 98.7  F (37.1  C)  97.6  F (36.4  C) 98.2  F (36.8  C)   TempSrc: Oral  Oral Oral   SpO2: 96%  96% 94%     Afebrile. VSS. O2 sats above 94% on RA. Lung sounds clear. Denies pain, denies nausea. Abdomen contour irregular, hernia noted RLQ. Hypoactive BS, passing flatus, had BM yesterday.  at 0300. NPO status, patient aware. MIVF increased to 75 mL/hr per PIV per orders. Manuel wipes completed, had shower last night. Gave report to pre-op RN, pre-op checklist completed. Patient sent to OR at 0600 with transport. Slept in between cares. Continue POC.

## 2018-06-26 VITALS
SYSTOLIC BLOOD PRESSURE: 131 MMHG | RESPIRATION RATE: 18 BRPM | HEIGHT: 65 IN | TEMPERATURE: 95.9 F | HEART RATE: 86 BPM | DIASTOLIC BLOOD PRESSURE: 68 MMHG | OXYGEN SATURATION: 96 %

## 2018-06-26 LAB
GLUCOSE BLDC GLUCOMTR-MCNC: 213 MG/DL (ref 70–99)
GLUCOSE BLDC GLUCOMTR-MCNC: 218 MG/DL (ref 70–99)
GLUCOSE BLDC GLUCOMTR-MCNC: 218 MG/DL (ref 70–99)
GLUCOSE BLDC GLUCOMTR-MCNC: 260 MG/DL (ref 70–99)

## 2018-06-26 PROCEDURE — 25000132 ZZH RX MED GY IP 250 OP 250 PS 637: Mod: GY | Performed by: SURGERY

## 2018-06-26 PROCEDURE — 00000146 ZZHCL STATISTIC GLUCOSE BY METER IP

## 2018-06-26 PROCEDURE — A9270 NON-COVERED ITEM OR SERVICE: HCPCS | Mod: GY | Performed by: SURGERY

## 2018-06-26 RX ORDER — OXYCODONE HYDROCHLORIDE 5 MG/1
5-10 TABLET ORAL EVERY 6 HOURS PRN
Qty: 15 TABLET | Refills: 0 | Status: SHIPPED | OUTPATIENT
Start: 2018-06-26 | End: 2018-10-16

## 2018-06-26 RX ORDER — PRAVASTATIN SODIUM 40 MG
40 TABLET ORAL DAILY
Qty: 30 TABLET
Start: 2018-06-26 | End: 2018-10-05

## 2018-06-26 RX ADMIN — ASPIRIN 81 MG CHEWABLE TABLET 81 MG: 81 TABLET CHEWABLE at 08:46

## 2018-06-26 RX ADMIN — ACETAMINOPHEN 650 MG: 325 TABLET, FILM COATED ORAL at 04:46

## 2018-06-26 RX ADMIN — METFORMIN HYDROCHLORIDE 1000 MG: 500 TABLET, EXTENDED RELEASE ORAL at 08:46

## 2018-06-26 RX ADMIN — ACETAMINOPHEN 650 MG: 325 TABLET, FILM COATED ORAL at 08:46

## 2018-06-26 RX ADMIN — LOSARTAN POTASSIUM 50 MG: 50 TABLET ORAL at 08:46

## 2018-06-26 NOTE — PLAN OF CARE
Problem: Patient Care Overview  Goal: Individualization & Mutuality  Outcome: No Change  Vitals:    06/25/18 2020 06/25/18 2239 06/26/18 0023 06/26/18 0443   BP: 154/66 123/56  123/70   BP Location: Right arm Left arm     Pulse: 86      Resp: 17 20 17 16   Temp: 97.6  F (36.4  C) 97.9  F (36.6  C)  97.4  F (36.3  C)   TempSrc: Oral Oral  Oral   SpO2: 92% 93% 95% 94%   Height:         Afebrile. VSS. O2 sats high 90s on 2 L/min NC. On capnography, IPI 9-10. Lung sounds clear. Encouraged deep breathing and coughing and IS. Reached 1500 on IS. Lap sites have scant dried drainage. Abdominal binder in place. Hypoactive BS, passing small flatus, no BM. Denies nausea. Up to bathroom with SBA, voiding adequate amount of clear amina urine. On moderate CHO diet,  at 0400. PRN Tylenol x1 for good pain control. Slept in between cares. Continue POC.

## 2018-06-26 NOTE — PLAN OF CARE
Problem: Patient Care Overview  Goal: Plan of Care/Patient Progress Review  Outcome: Adequate for Discharge Date Met: 06/26/18  VSS. Pt up ad derek, showered, voiding. Pt waiting for  to arrive around 1200 to D/C. AVS printed and signed. Pt had no questions or concerns. Pain is managed with tylenol PRN. Tolerating regular diet well. BG elevated, SSI given. PIV removed.

## 2018-06-26 NOTE — DISCHARGE SUMMARY
General Surgery Discharge Summary    Mackenzie Trinidad MRN# [id number]   YOB: 1945 Age: 73 year old     Date of Admission:  6/23/2018  Date of Discharge::  6/26/2018  Admitting Physician:  Bo Cassidy MD  Discharge Physician:  [unfilled]  Primary Care Physician:        Dorota Art          Admission Diagnoses:   SBO (small bowel obstruction) [K56.609]  Ventral hernia with obstruction and without gangrene [K43.6]         Discharge Diagnosis:   Same         Procedures:   Laparoscopic Herniorrhaphy Ventral by Dr. Ibrahim        Non-operative procedures:   None performed          Consultations:   VASCULAR ACCESS CARE ADULT IP CONSULT         Medications Prior to Admission:     Prescriptions Prior to Admission   Medication Sig Dispense Refill Last Dose     aspirin 81 MG tablet Take  by mouth daily.   6/23/2018 at Unknown time     metFORMIN (GLUCOPHAGE-XR) 500 MG 24 hr tablet Take 2 tablets (1,000 mg) by mouth 2 times daily (with meals) 360 tablet 3 6/23/2018 at Unknown time     ACCU-CHEK JULIO CÉSAR PLUS test strip TWICE DAILY 200 strip 3      CALCIUM 500 + D OR 1 tablet twice daily   Taking     FLAX SEED OIL OR twice daily   Taking     FREESTYLE LANCETS MISC 1 each by Lancet route 2 times daily 100 each prn Taking     losartan (COZAAR) 50 MG tablet Take 1 tablet (50 mg) by mouth daily 90 tablet 3      pravastatin (PRAVACHOL) 40 MG tablet TAKE ONE TABLET EVERY DAY 90 tablet 3             Discharge Medications:     Current Discharge Medication List      CONTINUE these medications which have NOT CHANGED    Details   aspirin 81 MG tablet Take  by mouth daily.      metFORMIN (GLUCOPHAGE-XR) 500 MG 24 hr tablet Take 2 tablets (1,000 mg) by mouth 2 times daily (with meals)  Qty: 360 tablet, Refills: 3    Associated Diagnoses: Type 2 diabetes mellitus with hyperlipidemia (H)      ACCU-CHEK JULIO CÉSAR PLUS test strip TWICE DAILY  Qty: 200 strip, Refills: 3    Associated Diagnoses: Type 2 diabetes, HbA1c goal < 7%  "(H)      CALCIUM 500 + D OR 1 tablet twice daily      FLAX SEED OIL OR twice daily      FREESTYLE LANCETS MISC 1 each by Lancet route 2 times daily  Qty: 100 each, Refills: prn    Associated Diagnoses: Diabetes mellitus, type 2 (H)      losartan (COZAAR) 50 MG tablet Take 1 tablet (50 mg) by mouth daily  Qty: 90 tablet, Refills: 3    Associated Diagnoses: Tinnitus, right      pravastatin (PRAVACHOL) 40 MG tablet TAKE ONE TABLET EVERY DAY  Qty: 90 tablet, Refills: 3    Associated Diagnoses: Hyperlipidemia LDL goal <100                   Day of Discharge Exam   /68 (BP Location: Right arm)  Pulse 86  Temp 95.9  F (35.5  C) (Oral)  Resp 18  Ht 1.651 m (5' 5\")  SpO2 96%    General:  A&Ox3, NAD  Cardio:   RRR  Chest:   Non labored breathing on RA  Abd:   Soft, non-distended, appropriately TTP, incision c/d/i  Ext:   WWP          Brief History of Illness:   Mackenzie Trinidad is a 73 year old female with a very remote history of appendectomy and right lower quadrant hernia repair that has persisted despite repair for many years. She presented with One day of nausea, vomiting and diaphoresis with CT imaging showing a ventral hernia with obstructed bowel within hernia. She was admitted for further evaluation and treatment.            Hospital Course:   The patient was admitted and hernia was manually reduced and patient was observed overnight. On HD#2 the patient was started on clear liquid diet and patient opted for surgical repair of hernia and gave consent for operation. On HD#3 patient underwent the above procedure. The patient tolerated the procedure well. There were no complications. Postoperatively the patient was transferred to the general floor for further care. The patient's diet was slowly advanced as bowel function returned. Pain was controlled with oral pain medication and the patient was able to ambulate and void without difficulty. The patient received appropriate education post operatively. On POD #1 " the patient was discharged to home with appropriate instructions and follow up. The patient acknowledged understanding and were in agreement with the plan.         Antibiotics Prescribed at Discharge:   None prescribed         Imaging Studies:   No studies require specific follow-up  Results for orders placed or performed during the hospital encounter of 06/23/18   XR Chest Port 1 View    Narrative    XR CHEST PORT 1 VW  6/23/2018 6:54 PM      HISTORY: cp;     COMPARISON: None    FINDINGS: The cardiomediastinal silhouette and pulmonary vasculature  are within normal limits. No focal pulmonary opacity. No pleural  effusion or pneumothorax are identified.      Impression    IMPRESSION: No acute cardiopulmonary abnormality.    I have personally reviewed the examination and initial interpretation  and I agree with the findings.    MANNY RICE MD   POC US ECHO LIMITED    Impression    Limited Bedside Cardiac Ultrasound, performed and interpreted by me.   Indication: Chest Pain.  Parasternal long axis, parasternal short axis and apical 4 chamber views were acquired.   Image quality was satisfactory.    Findings:    Global left ventricular function appears intact.  Chambers do not appear dilated.  There is no evidence of free fluid within the pericardium.    IMPRESSION: Grossly normal left ventricular function and chamber size.  No pericardial effusion.    [facility] Procedure Note      Limited Bedside ED Aorta Ultrasound:    PROCEDURE: PERFORMED BY: Dr. Dre Kaufman MD  INDICATIONS:  Abdominal Pain    PROBE:  Low frequency convex probe  BODY LOCATION: Abdomen  FINDINGS:  The ultrasound was performed using longitudinal and transverse views.   Normal: Abdominal aorta < 4 cm.  MEASUREMENT:  1.9 cm   No evidence of free fluid in hepatorenal (Morison s pouch), perisplenic, or and pelvic areas. No evidence of pericardial effusion.  INTERPRETATION:  The evaluation of the aorta was of normal caliber (ie < 4cm in  the transverse/longitudinal views) without evidence of aneurysm or dilation.  Aorta visualized in entirety from insertion into the intra-abdominal cavity to bifurcation into iliac vessels. There was no abdominal free fluid.  IMAGE DOCUMENTATION: Images were archived to PACs system. .    CT Abdomen Pelvis w Contrast    Narrative    EXAMINATION: CT abdomen and pelvis, 6/23/2018 8:31 PM    TECHNIQUE:  Helical CT images from the lung bases through the  symphysis pubis were obtained with IV contrast.    COMPARISON: No similar study.    HISTORY: Abdominal pain.    FINDINGS:    Abdomen and pelvis: Ventral abdominal hernia in the right lower  quadrant abdominal wall containing fat and a single loop of small  bowel leading to a transition point with upstream mildly dilated loops  of small bowel concerning for partial obstruction. Remaining loops of  small bowel are not dilated. No pneumatosis. No portal venous gas. The  colon is decompressed. Colonic diverticulosis. No associated CT  findings of acute diverticulitis. Moderate sliding hiatal hernia.  Otherwise decompressed stomach.    Liver, gallbladder, pancreas, spleen, adrenal glands are unremarkable.  No renal stones or hydronephrosis bilaterally. Suggestion of  parapelvic cysts bilaterally, left greater than right. Unremarkable  urinary bladder. Pelvic phleboliths. Prior hysterectomy. No adnexal  masses. No free fluid. No free air. No inguinal lymphadenopathy. No  suspicious pelvic lymph nodes. No abdominal aortic aneurysm. Fat  stranding along the mesenteric root with associated prominent  mesenteric lymph nodes measuring up to 7 mm short axis, not enlarged  by size criteria. Small fat containing umbilical hernia.     Lung bases: No pleural effusions. Bilateral lower lobes dependent  atelectasis.    Bones: Degenerative changes of the spine. Scattered Schmorl's nodes.  Scattered intradiscal gas. Degenerative changes of bilateral SI joints  and hips. No acute osseous  abnormalities.      Impression    IMPRESSION: In this patient with history of abdominal pain:   1. CT findings concerning for partial small bowel obstruction with a  transition point at the level of a ventral abdominal hernia in the  right lower quadrant abdominal wall containing fat and a single loop  of small bowel. There is upstream mild dilation of small bowel loops.  2. Colonic diverticulosis. No associated CT findings of acute  diverticulitis.  3. Moderate sliding hiatal hernia.  4. 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    [Result: Findings concerning for small bowel obstruction due to  ventral herniation of small bowel loop.]    Finding was identified on 6/23/2018 8:29 PM.     was contacted by  at 6/23/2018 8:38 PM and verbalized understanding  of the urgent finding.     MANNY RICE MD            Final Pathology Result:   No pathology submitted         Discharge Instructions:     - No lifting greater than 15 pounds for 8 weeks after surgery.   - You may shower and get incisions wet starting 48 hrs after surgery but do not scrub incisions or submerge wounds (aka, bath, pool, hot tub, ect.) for 2 weeks. Remove outer dressing (if placed) after 48 hrs from surgery. If dermabond was used, avoid applying any lotions or ointments. If steri-strips were used, they will fall off on their own. You may leave open to air or cover with dry gauze if needed for comfort.  - No driving while taking narcotic pain medications.   - If you develop constipation, take stool softeners such as colace or miralax but stop if you develop diarrhea. Wean yourself off of narcotics.   - Call your primary provider to touch base with them regarding your recent admission.   - If you develop any fever/chills, worsening pain, redness, swelling, or drainage from your wound please call (Clinic [phone]).          Follow-Up:     - Follow up with Dr. Ibrahim in clinic in 2-4 week(s) after  discharge. You should be called to make an appointment within 3 business days. If you are not contacted, call [phone] to make an appointment        Home Health Care:   Not needed           Discharge Disposition:   Discharged to home      Condition at discharge: Good

## 2018-06-26 NOTE — PLAN OF CARE
BP hypertensive within parameters. Oxygen titrated down to 1 L with O2 sats 92-95%. States pain is well controlled with tylenol and oxycodone. Lap site x5 with prima pore CDI and steri strips to right abdomen. Tolerating consistent carb diet. BS elevated 265 and 291, covered per SS parameters. Indigestion pain resolved after PRN tums. Up to bathroom with SBA. Voided 400 cc. Continue with poc.

## 2018-06-27 ENCOUNTER — TELEPHONE (OUTPATIENT)
Dept: FAMILY MEDICINE | Facility: CLINIC | Age: 73
End: 2018-06-27

## 2018-06-27 ENCOUNTER — CARE COORDINATION (OUTPATIENT)
Dept: SURGERY | Facility: CLINIC | Age: 73
End: 2018-06-27

## 2018-06-27 NOTE — PROGRESS NOTES
RN Post-Op/Post-Discharge Care Coordination Note    Ms. Mackenzie Trinidad is a 73 year old female who underwent laparoscopic ventral hernia repair on 6/25 with  Dr. Anthony Ibrahim.  Spoke with Patient.    Support  Patient able to care for self independently     Health Status  Fevers/chills: Patient denies any fever or chills.  Nausea/Vomiting: Patient denies nausea/vomiting.  Eating/drinking: Patient is able to eat and drink without any complaints.  Bowel habits: Patient reports having a normal bowel movement.  Drains (ALIYAH): N/A  Incisions: Patient denies any signs and symptoms of infection..  Wound closure:  Steri-strips   Pain: Improving.  Patient is not taking Oxycodone. She is alternating Tylenol/Ibuprofen with good pain control.    New Medications:  Oxycodone    Activity/Restrictions  No lifting in excess of 15-20 pounds for 3-4 weeks    Equipment  None    Pathology reviewed with patient:  N/A    All of her questions were answered including reviewing restrictions and wound care.  She will call this office if she has any further questions and/or concerns.      In lieu of a post-op clinic patient that patient would like to be contacted in approximately 10 days via telephone.    Whom and When to Call  Patient acknowledges understanding of how to manage any medication changes and   when to seek medical care.     Patient advised that if after hour medical concerns arise to please call [phone] and choose option 4 to speak to the physician on call.     A copy of this note was routed to the primary surgeon.

## 2018-06-27 NOTE — TELEPHONE ENCOUNTER
.ED/UC/IP follow up phone call:   PREMA  6/26/18  Small bowel obstruction    RN please call to follow up.    Number of ED visits in past 12 months = 0/1

## 2018-06-28 NOTE — TELEPHONE ENCOUNTER
"ED/Discharge Protocol    \"Hi, my name is Roxana Briceno, a registered nurse, and I am calling on behalf of Dr. Art's office at [place].  I am calling to follow up and see how things are going for you after your recent visit.\"    \"I see that you were in the (ER/UC/IP) on 6/26/18.    How are you doing now that you are home?\" Patient is doing ok with pain. Alternating with ibuprofen and tylenol. Eating small meals and resting. Doing well over all. Follow up appointment made.  Roxana Briceno RN        "

## 2018-07-06 ENCOUNTER — CARE COORDINATION (OUTPATIENT)
Dept: SURGERY | Facility: CLINIC | Age: 73
End: 2018-07-06

## 2018-07-06 NOTE — PROGRESS NOTES
Mackenzie Trinidad is a patient of Dr. Anthony Ibrahim that recently underwent a surgical procedure.  The patient was contacted via telephone approximately 10 days ago for a status update and post-op teaching.  In lieu of a clinic visit, that patient requested to be contacted at a later date by an RN for an assessment.    Attempted to contact patient via telephone for a status update.  LM on VM to call office.

## 2018-07-10 NOTE — PROGRESS NOTES
Mackenzie Trinidad was contacted several days post procedure via telephone for a status update and elected to have a telephone follow -up call approximately 10 days after original contact in lieu of a clinic visit with Dr. Anthony Ibrahim.  He continues to do well and the steri-strips have peeled off.  The patients wounds are healed and the area is C/D/I.  She is afebrile, has minimal pain, is having normal bowel movements, and aguila po; the patient is slowly resuming her normal activity. Discussed lifting restrictions and activity restrictions, such as golfing, and when she can resume these things.    Pathology was reviewed with the patient: N/A    All of Mackenzie Trinidad question's were answered and  she would like to return to the clinic on a PRN basis.      A copy of this note was routed to his surgeon.

## 2018-07-11 ENCOUNTER — OFFICE VISIT (OUTPATIENT)
Dept: FAMILY MEDICINE | Facility: CLINIC | Age: 73
End: 2018-07-11
Payer: COMMERCIAL

## 2018-07-11 VITALS
HEART RATE: 72 BPM | WEIGHT: 181.9 LBS | RESPIRATION RATE: 12 BRPM | BODY MASS INDEX: 30.27 KG/M2 | SYSTOLIC BLOOD PRESSURE: 144 MMHG | DIASTOLIC BLOOD PRESSURE: 70 MMHG | TEMPERATURE: 98.3 F

## 2018-07-11 DIAGNOSIS — E11.69 TYPE 2 DIABETES MELLITUS WITH HYPERLIPIDEMIA (H): Primary | ICD-10-CM

## 2018-07-11 DIAGNOSIS — Z98.890 S/P REPAIR OF VENTRAL HERNIA: ICD-10-CM

## 2018-07-11 DIAGNOSIS — E78.5 TYPE 2 DIABETES MELLITUS WITH HYPERLIPIDEMIA (H): Primary | ICD-10-CM

## 2018-07-11 DIAGNOSIS — Z87.19 S/P REPAIR OF VENTRAL HERNIA: ICD-10-CM

## 2018-07-11 DIAGNOSIS — E78.5 HYPERLIPIDEMIA LDL GOAL <100: ICD-10-CM

## 2018-07-11 LAB
CHOLEST SERPL-MCNC: 158 MG/DL
CREAT UR-MCNC: 180 MG/DL
HBA1C MFR BLD: 8.1 % (ref 0–5.6)
HDLC SERPL-MCNC: 63 MG/DL
LDLC SERPL CALC-MCNC: 62 MG/DL
MICROALBUMIN UR-MCNC: 432 MG/L
MICROALBUMIN/CREAT UR: 240 MG/G CR (ref 0–25)
NONHDLC SERPL-MCNC: 95 MG/DL
TRIGL SERPL-MCNC: 167 MG/DL
TSH SERPL DL<=0.005 MIU/L-ACNC: 1.19 MU/L (ref 0.4–4)

## 2018-07-11 PROCEDURE — 36415 COLL VENOUS BLD VENIPUNCTURE: CPT | Performed by: FAMILY MEDICINE

## 2018-07-11 PROCEDURE — 83036 HEMOGLOBIN GLYCOSYLATED A1C: CPT | Performed by: FAMILY MEDICINE

## 2018-07-11 PROCEDURE — 80061 LIPID PANEL: CPT | Performed by: FAMILY MEDICINE

## 2018-07-11 PROCEDURE — 84443 ASSAY THYROID STIM HORMONE: CPT | Performed by: FAMILY MEDICINE

## 2018-07-11 PROCEDURE — 99214 OFFICE O/P EST MOD 30 MIN: CPT | Performed by: FAMILY MEDICINE

## 2018-07-11 PROCEDURE — 82043 UR ALBUMIN QUANTITATIVE: CPT | Performed by: FAMILY MEDICINE

## 2018-07-11 ASSESSMENT — PAIN SCALES - GENERAL: PAINLEVEL: NO PAIN (0)

## 2018-07-11 NOTE — PATIENT INSTRUCTIONS
Check some blood sugar during the other times of the day and bring the meter in next time in 3 months when I see you next and some blood pressure measurements from home

## 2018-07-11 NOTE — MR AVS SNAPSHOT
After Visit Summary   7/11/2018    Mackenzie Trinidad    MRN: [id number]           Patient Information     Date Of Birth          1945        Visit Information        Provider Department      7/11/2018 8:30 AM Dorota Art MD [facility]        Today's Diagnoses     Type 2 diabetes mellitus with hyperlipidemia (H)    -  1    Hyperlipidemia LDL goal <100        S/P repair of ventral hernia          Care Instructions    Check some blood sugar during the other times of the day and bring the meter in next time in 3 months when I see you next and some blood pressure measurements from home          Follow-ups after your visit        Who to contact     Normal or non-critical lab and imaging results will be communicated to you by [employer], letter or phone within 4 business days after the clinic has received the results. If you do not hear from us within 7 days, please contact the clinic through [employer] or phone. If you have a critical or abnormal lab result, we will notify you by phone as soon as possible.  Submit refill requests through [employer] or call your pharmacy and they will forward the refill request to us. Please allow 3 business days for your refill to be completed.          If you need to speak with a  for additional information , please call: [phone]             Additional Information About Your Visit        My[employer] Information     [employer] gives you secure access to your electronic health record. If you see a primary care provider, you can also send messages to your care team and make appointments. If you have questions, please call your primary care clinic.  If you do not have a primary care provider, please call [phone] and they will assist you.        Care EveryWhere ID     This is your Care EveryWhere ID. This could be used by other organizations to access your [facility] medical records  [id number]        Your Vitals Were     Pulse Temperature  Respirations BMI (Body Mass Index)          72 98.3  F (36.8  C) (Tympanic) 12 30.27 kg/m2         Blood Pressure from Last 3 Encounters:   07/11/18 144/70   06/26/18 131/68   08/02/17 148/89    Weight from Last 3 Encounters:   07/11/18 181 lb 14.4 oz (82.5 kg)   08/02/17 182 lb 14.4 oz (83 kg)   11/28/16 185 lb 14.4 oz (84.3 kg)              We Performed the Following     Albumin Random Urine Quantitative with Creat Ratio     Hemoglobin A1c     Lipid panel reflex to direct LDL Fasting     TSH with free T4 reflex        Primary Care Provider Office Phone # Fax #    Dorota Art [phone] [id number] [facility] [address]        Equal Access to Services     JENNI DAY : Hadii aad ku hadasho Sosabra, waaxda luqadaha, qaybta kaalmada adeegyada, mariela sorto . So [facility] [phone].    ATENCIÓN: Si habla español, tiene a proctor disposición servicios gratuitos de asistencia lingüística. Llame al [phone].    We comply with applicable federal civil rights laws and [place] laws. We do not discriminate on the basis of race, color, national origin, age, disability, sex, sexual orientation, or gender identity.            Thank you!     Thank you for choosing [facility]  for your care. Our goal is always to provide you with excellent care. Hearing back from our patients is one way we can continue to improve our services. Please take a few minutes to complete the written survey that you may receive in the mail after your visit with us. Thank you!             Your Updated Medication List - Protect others around you: Learn how to safely use, store and throw away your medicines at www.disposemymeds.org.          This list is accurate as of 7/11/18  9:13 AM.  Always use your most recent med list.                   Brand Name Dispense Instructions for use Diagnosis    ACCU-CHEK JULIO CÉSAR PLUS test strip   Generic drug:  blood glucose monitoring     200 strip     TWICE DAILY    Type 2 diabetes, HbA1c goal < 7% (H)       aspirin 81 MG tablet      Take  by mouth daily.        blood glucose monitoring lancets     100 each    1 each by Lancet route 2 times daily    Diabetes mellitus, type 2 (H)       CALCIUM 500 + D PO      1 tablet twice daily        FLAX SEED OIL PO      twice daily        losartan 50 MG tablet    COZAAR    90 tablet    Take 1 tablet (50 mg) by mouth daily    Tinnitus, right       metFORMIN 500 MG 24 hr tablet    GLUCOPHAGE-XR    360 tablet    Take 2 tablets (1,000 mg) by mouth 2 times daily (with meals)    Type 2 diabetes mellitus with hyperlipidemia (H)       oxyCODONE IR 5 MG tablet    ROXICODONE    15 tablet    Take 1-2 tablets (5-10 mg) by mouth every 6 hours as needed for moderate to severe pain    Ventral hernia with obstruction and without gangrene       pravastatin 40 MG tablet    PRAVACHOL    30 tablet    Take 1 tablet (40 mg) by mouth daily    Hyperlipidemia LDL goal <100

## 2018-07-11 NOTE — PROGRESS NOTES
SUBJECTIVE:   Mackenzie Trinidad is a 73 year old female who presents to clinic today for the following health issues:    Chief Complaint   Patient presents with     Hospital F/U     Recheck Medication     pt is fasting       Hospital Follow-up Visit:    Hospital/Nursing Home/IP Rehab Facility: [facility]  Date of Admission: 6/23/18  [facility] reviewedReason(s) for Admission: small bowel obstruction, ventral hernia with obstruction and without gangrene            Problems taking medications regularly:  None       Medication changes since discharge: None       Problems adhering to non-medication therapy:  None    Summary of hospitalization:  [facility] discharge summary reviewed  Diagnostic Tests/Treatments reviewed.  Follow up needed: none  Other Healthcare Providers Involved in Patient s Care:         None  Update since discharge: improved.     Post Discharge Medication Reconciliation: discharge medications reconciled, continue medications without change.  Plan of care communicated with patient     Coding guidelines for this visit:  Type of Medical   Decision Making Face-to-Face Visit       within 7 Days of discharge Face-to-Face Visit        within 14 days of discharge   Moderate Complexity [id number]   High Complexity [id number]            Diabetes Follow-up      Patient is checking blood sugars: 1-2 times daily    Diabetic concerns: blood sugar frequently over 200 while in the hospital     Symptoms of hypoglycemia (low blood sugar): none     Paresthesias (numbness or burning in feet) or sores: No     Date of last diabetic eye exam: 8/2017-[facility]    Diabetes Management Resources    Hyperlipidemia Follow-Up      Rate your low fat/cholesterol diet?: good    Taking statin?  Yes, no muscle aches from statin    Other lipid medications/supplements?:  none    Hypertension Follow-up      Outpatient blood pressures are being checked at  home.  Results are 129/79.    Low Salt Diet: low salt    BP Readings from Last 2 Encounters:   06/26/18 131/68   08/02/17 148/89     Hemoglobin A1C (%)   Date Value   08/02/2017 7.5 (H)   11/28/2016 9.4 (H)     LDL Cholesterol Calculated (mg/dL)   Date Value   08/02/2017 75   06/06/2016 81       Amount of exercise or physical activity: 2-3 days/week for an average of 30-45 minutes    Problems taking medications regularly: No    Medication side effects: none    Diet: low salt      Blood sugar this am was 168 before surgery they were 120-130    Results for orders placed or performed in visit on 07/11/18   Hemoglobin A1c   Result Value Ref Range    Hemoglobin A1C 8.1 (H) 0 - 5.6 %       She has been feeling well bowels are back to normal she feels well  Not taking anything for pain   Eating a regular diet   She is taking all of her regular medications   Wearing an abdominal bindre    Problem list and histories reviewed & adjusted, as indicated.  Additional history: as documented    Patient Active Problem List   Diagnosis     FAMILY HISTORY OF OSTEOPORSIS     FAMILY HISTORY OF DIABETES MELLITUS     FAMILY HISTORY OF COLON CANCER     MYOPIA     PRESBYOPIA     Diverticulosis of large intestine     Hyperlipidemia LDL goal <100     Advanced directives, counseling/discussion     Type 2 diabetes, HbA1c goal < 7% (H)     Type 2 diabetes mellitus with hyperlipidemia (H)     Benign essential hypertension     Ventral hernia     Past Surgical History:   Procedure Laterality Date     ARTHRODESIS FOOT Right 10/23/2014    Procedure: ARTHRODESIS FOOT;  Surgeon: Mauri Gomez DPM;  Location: [facility] OR     COLONOSCOPY  10/10/2007    Repeat in 5 years     LAPAROSCOPIC HERNIORRHAPHY VENTRAL N/A 6/25/2018    Procedure: LAPAROSCOPIC HERNIORRHAPHY VENTRAL;  LAPAROSCOPIC HERNIORRHAPHY VENTRAL ;  Surgeon: Jose Ibrahim MD;  Location:  OR     SURGICAL HISTORY OF -   1996    Hysterectomy     SURGICAL HISTORY OF -   3/2002    Right  hammer toe/bunionectomy       Social History   Substance Use Topics     Smoking status: Former Smoker     Quit date: 1/1/1971     Smokeless tobacco: Never Used     Alcohol use Yes      Comment: social     Family History   Problem Relation Age of Onset     Hypertension Mother      Osteoperosis Mother      HEART DISEASE Mother 94     small heart attack     Cancer Father      bone cancer     Hypertension Brother      Lipids Sister      Diabetes Sister      diet controlled     Cancer - colorectal Other      Hypertension Brother      Diabetes Brother      Lipids Brother      Cancer - colorectal Brother      Lipids Brother      Hypertension Sister      Asthma No family hx of      C.A.D. No family hx of      Cerebrovascular Disease No family hx of      Breast Cancer No family hx of      Prostate Cancer No family hx of            Reviewed and updated as needed this visit by clinical staff       Reviewed and updated as needed this visit by Provider     home blood pressure measurements are 120/80    ROS:  Constitutional, HEENT, cardiovascular, pulmonary, GI, , musculoskeletal, neuro, skin, endocrine and psych systems are negative, except as otherwise noted.    OBJECTIVE:     /70 (BP Location: Right arm, Patient Position: Sitting, Cuff Size: Adult Regular)  Pulse 72  Temp 98.3  F (36.8  C) (Tympanic)  Resp 12  Wt 181 lb 14.4 oz (82.5 kg)  BMI 30.27 kg/m2  Body mass index is 30.27 kg/(m^2).  GENERAL APPEARANCE: healthy, alert and no distress  NECK: no adenopathy, no asymmetry, masses, or scars and thyroid normal to palpation  RESP: lungs clear to auscultation - no rales, rhonchi or wheezes  ABDOMEN: bowel sounds normal and minimally tender with well healing surgical wounds   DIABETIC FOOT EXAM: normal DP and PT pulses, no trophic changes or ulcerative lesions and normal sensory exam    Diagnostic Test Results:  Results for orders placed or performed in visit on 07/11/18 (from the past 24 hour(s))   Hemoglobin A1c    Result Value Ref Range    Hemoglobin A1C 8.1 (H) 0 - 5.6 %       ASSESSMENT/PLAN:             1. Type 2 diabetes mellitus with hyperlipidemia (H)    - Hemoglobin A1c  - Albumin Random Urine Quantitative with Creat Ratio  - TSH with free T4 reflex    2. Hyperlipidemia LDL goal <100    - Lipid panel reflex to direct LDL Fasting    3. S/P repair of ventral hernia  Doing well post op   Patient Instructions   Check some blood sugar during the other times of the day and bring the meter in next time in 3 months when I see you next and some blood pressure measurements from home          FUTURE APPOINTMENTS:       - Follow-up visit in 3 months     Dorota Art MD  [facility]

## 2018-07-17 NOTE — DISCHARGE SUMMARY
Discharge Summary  Admission Dx: incarcerated ventral incisional hernia  Discharge Dx: same  Hospital procedures: laparoscopic ventral incisional hernia repair with mesh  dicharge condition/disposition: stable, home    Hospital course:  Patient presented to ED with abd pain. CT confirmed hernia.  Attempts at reduction were unsuccessful as some of the hernia contents remained incarcerated.  The patient was admitted and scheduled for repair.  The laparoscopic repair was performed.  Her convalescence was unremarkable.    Follow up after discharge with Dr. Ibrahim  [facility] prehospital meds.

## 2018-08-03 DIAGNOSIS — H93.11 TINNITUS, RIGHT: ICD-10-CM

## 2018-08-03 RX ORDER — LOSARTAN POTASSIUM 50 MG/1
TABLET ORAL
Qty: 90 TABLET | Refills: 2 | Status: SHIPPED | OUTPATIENT
Start: 2018-08-03 | End: 2019-08-22

## 2018-08-03 NOTE — TELEPHONE ENCOUNTER
Routing refill request to provider for review/approval because:  BPs  BP Readings from Last 3 Encounters:   07/11/18 144/70   06/26/18 131/68   08/02/17 148/89     Olimpia EDDY RN

## 2018-08-03 NOTE — TELEPHONE ENCOUNTER
"Requested Prescriptions   Pending Prescriptions Disp Refills     losartan (COZAAR) 50 MG tablet [Pharmacy Med Name: LOSARTAN 50MG TAB] 90 tablet 2    Last Written Prescription Date:  6/2/17  Last Fill Quantity: 90,  # refills: 3   Last office visit: 7/11/2018 with prescribing provider:  7/11/18 odette   Future Office Visit:     Sig: TAKE 1 TABLET (50 MG) BY MOUTH DAILY    Angiotensin-II Receptors Failed    8/3/2018  9:28 AM       Failed - Blood pressure under 140/90 in past 12 months    BP Readings from Last 3 Encounters:   07/11/18 144/70   06/26/18 131/68   08/02/17 148/89                Passed - Recent (12 mo) or future (30 days) visit within the authorizing provider's specialty    Patient had office visit in the last 12 months or has a visit in the next 30 days with authorizing provider or within the authorizing provider's specialty.  See \"Patient Info\" tab in inbasket, or \"Choose Columns\" in Meds & Orders section of the refill encounter.           Passed - Patient is age 18 or older       Passed - No active pregnancy on record       Passed - Normal serum creatinine on file in past 12 months    Recent Labs   Lab Test  06/24/18   0730   CR  0.52            Passed - Normal serum potassium on file in past 12 months    Recent Labs   Lab Test  06/24/18   0730   POTASSIUM  3.6                   Passed - No positive pregnancy test in past 12 months          "

## 2018-08-08 ENCOUNTER — VIRTUAL VISIT (OUTPATIENT)
Dept: FAMILY MEDICINE | Facility: CLINIC | Age: 73
End: 2018-08-08
Payer: COMMERCIAL

## 2018-08-08 DIAGNOSIS — R82.90 NONSPECIFIC FINDING ON EXAMINATION OF URINE: ICD-10-CM

## 2018-08-08 DIAGNOSIS — R30.0 DYSURIA: Primary | ICD-10-CM

## 2018-08-08 DIAGNOSIS — R30.0 DYSURIA: ICD-10-CM

## 2018-08-08 DIAGNOSIS — N30.00 ACUTE CYSTITIS WITHOUT HEMATURIA: Primary | ICD-10-CM

## 2018-08-08 LAB
ALBUMIN UR-MCNC: NEGATIVE MG/DL
APPEARANCE UR: ABNORMAL
BACTERIA #/AREA URNS HPF: ABNORMAL /HPF
BILIRUB UR QL STRIP: NEGATIVE
COLOR UR AUTO: YELLOW
GLUCOSE UR STRIP-MCNC: NEGATIVE MG/DL
HGB UR QL STRIP: ABNORMAL
KETONES UR STRIP-MCNC: NEGATIVE MG/DL
LEUKOCYTE ESTERASE UR QL STRIP: ABNORMAL
NITRATE UR QL: POSITIVE
PH UR STRIP: 7 PH (ref 5–7)
RBC #/AREA URNS AUTO: ABNORMAL /HPF
SOURCE: ABNORMAL
SP GR UR STRIP: 1.01 (ref 1–1.03)
UROBILINOGEN UR STRIP-ACNC: 0.2 EU/DL (ref 0.2–1)
WBC #/AREA URNS AUTO: >100 /HPF

## 2018-08-08 PROCEDURE — 87086 URINE CULTURE/COLONY COUNT: CPT | Performed by: NURSE PRACTITIONER

## 2018-08-08 PROCEDURE — 99441 ZZC PHYSICIAN TELEPHONE EVALUATION 5-10 MIN: CPT | Performed by: NURSE PRACTITIONER

## 2018-08-08 PROCEDURE — 87186 SC STD MICRODIL/AGAR DIL: CPT | Performed by: NURSE PRACTITIONER

## 2018-08-08 PROCEDURE — 87088 URINE BACTERIA CULTURE: CPT | Performed by: NURSE PRACTITIONER

## 2018-08-08 PROCEDURE — 81001 URINALYSIS AUTO W/SCOPE: CPT | Performed by: NURSE PRACTITIONER

## 2018-08-08 RX ORDER — SULFAMETHOXAZOLE/TRIMETHOPRIM 800-160 MG
1 TABLET ORAL 2 TIMES DAILY
Qty: 6 TABLET | Refills: 0 | Status: SHIPPED | OUTPATIENT
Start: 2018-08-08 | End: 2018-08-11

## 2018-08-08 NOTE — PROGRESS NOTES
"Mackenzie Trinidad is a 73 year old female who is being evaluated via a telephone visit.      The patient has been notified of following:     \"This telephone visit will be conducted via a call between you and your physician/provider. We have found that certain health care needs can be provided without the need for a physical exam.  This service lets us provide the care you need with a short phone conversation.  If a prescription is necessary we can send it directly to your pharmacy.  If lab work is needed we can place an order for that and you can then stop by our lab to have the test done at a later time.    We will bill your insurance company for this service.  Please check with your medical insurance if this type of visit is covered. You may be responsible for the cost of this type of visit if insurance coverage is denied.  The typical cost is $30 (10min), $59 (11-20min) and $85 (21-30min).  Most often these visits are shorter than 10 minutes.    If during the course of the call the physician/provider feels a telephone visit is not appropriate, you will not be charged for this service.\"       Consent has been obtained for this service by care team member: yes.   See the scanned image in the medical record.    Mackenzie Trinidad complains of  UTI      I have reviewed and updated the patient's Past Medical History, Social History, Family History and Medication List.    ALLERGIES  Nkda [no known drug allergies]    Patient is having urinary frequency, nocturia x3, ogliuria, pressure with urination. Sx x 2 weeks. Treating with advil. Denies fever, back pain, n/v/d, dizziness.   Elizabeth Cleaning, DIRK    Results for orders placed or performed in visit on 08/08/18   *UA reflex to Microscopic and Culture ([place] [facility] (except [place] and [place])   Result Value Ref Range    Color Urine Yellow     Appearance Urine Slightly Cloudy     Glucose Urine Negative NEG^Negative mg/dL    Bilirubin Urine Negative " NEG^Negative    Ketones Urine Negative NEG^Negative mg/dL    Specific Gravity Urine 1.015 1.003 - 1.035    Blood Urine Moderate (A) NEG^Negative    pH Urine 7.0 5.0 - 7.0 pH    Protein Albumin Urine Negative NEG^Negative mg/dL    Urobilinogen Urine 0.2 0.2 - 1.0 EU/dL    Nitrite Urine Positive (A) NEG^Negative    Leukocyte Esterase Urine Moderate (A) NEG^Negative    Source Midstream Urine    Urine Microscopic   Result Value Ref Range    WBC Urine >100 (A) OTO5^0 - 5 /HPF    RBC Urine 5-10 (A) OTO2^O - 2 /HPF    Bacteria Urine Moderate (A) NEG^Negative /HPF           Additional provider notes: Advise push fluids, start antibiotic, culture pending.     Assessment/Plan:  (N30.0) Acute Cystitis without hematuria.     (R30.0) Dysuria  (primary encounter diagnosis)    Plan: *UA reflex to Microscopic and Culture        ([facility] (except [place]        and [place])          Urine culture- pending.     Advise push fluids, start antibiotic, culture pending. Come in for office visit for new or worsening symptoms.       I have reviewed the note as documented above.  This accurately captures the substance of my conversation with the patient,  KAMAR Monroe CNP      Total time of call between patient and provider was 5 minutes

## 2018-08-08 NOTE — MR AVS SNAPSHOT
After Visit Summary   8/8/2018    Mackenzie Trinidad    MRN: [id number]           Patient Information     Date Of Birth          1945        Visit Information        Provider Department      8/8/2018 1:00 PM Charleen Dia APRN [facility]        Today's Diagnoses     Acute cystitis without hematuria    -  1    Dysuria           Follow-ups after your visit        Who to contact     Normal or non-critical lab and imaging results will be communicated to you by [employer], letter or phone within 4 business days after the clinic has received the results. If you do not hear from us within 7 days, please contact the clinic through [employer] or phone. If you have a critical or abnormal lab result, we will notify you by phone as soon as possible.  Submit refill requests through [employer] or call your pharmacy and they will forward the refill request to us. Please allow 3 business days for your refill to be completed.          If you need to speak with a  for additional information , please call: [phone]             Additional Information About Your Visit        [employer] Information     [employer] gives you secure access to your electronic health record. If you see a primary care provider, you can also send messages to your care team and make appointments. If you have questions, please call your primary care clinic.  If you do not have a primary care provider, please call [phone] and they will assist you.        Care EveryWhere ID     This is your Care EveryWhere ID. This could be used by other organizations to access your [place] medical records  [id number]         Blood Pressure from Last 3 Encounters:   07/11/18 144/70   06/26/18 131/68   08/02/17 148/89    Weight from Last 3 Encounters:   07/11/18 181 lb 14.4 oz (82.5 kg)   08/02/17 182 lb 14.4 oz (83 kg)   11/28/16 185 lb 14.4 oz (84.3 kg)              Today, you had the following     No orders found for display          Today's Medication Changes          These changes are accurate as of 8/8/18  1:16 PM.  If you have any questions, ask your nurse or doctor.               Start taking these medicines.        Dose/Directions    sulfamethoxazole-trimethoprim 800-160 MG per tablet   Commonly known as:  BACTRIM DS/SEPTRA DS   Used for:  Acute cystitis without hematuria   Started by:  Charleen Dia APRN CNP        Dose:  1 tablet   Take 1 tablet by mouth 2 times daily for 3 days   Quantity:  6 tablet   Refills:  0            Where to get your medicines      These medications were sent to [place] [address] [facility] [address]     Phone:  [phone]     sulfamethoxazole-trimethoprim 800-160 MG per tablet                Primary Care Provider Office Phone # Fax #    Dorota Art [phone] [address] [facility] [address]        Equal Access to Services     [facility]: Hadii patricia villasenor hadasho Socloverali, waaxda luqadaha, qaybta kaalmada adeegyada, waxay maribel hayamee sorto . So [facility] [phone].    ATENCIÓN: Si habla español, tiene a proctor disposición servicios gratuitos de asistencia lingüística. Llame al [phone].    We comply with applicable federal civil rights laws and [place] laws. We do not discriminate on the basis of race, color, national origin, age, disability, sex, sexual orientation, or gender identity.            Thank you!     Thank you for choosing [facility]  for your care. Our goal is always to provide you with excellent care. Hearing back from our patients is one way we can continue to improve our services. Please take a few minutes to complete the written survey that you may receive in the mail after your visit with us. Thank you!             Your Updated Medication List - Protect others around you: Learn how to safely use, store and throw away your medicines at www.disposemymeds.org.          This  list is accurate as of 8/8/18  1:16 PM.  Always use your most recent med list.                   Brand Name Dispense Instructions for use Diagnosis    ACCU-CHEK JULIO CÉSAR PLUS test strip   Generic drug:  blood glucose monitoring     200 strip    TWICE DAILY    Type 2 diabetes, HbA1c goal < 7% (H)       aspirin 81 MG tablet      Take  by mouth daily.        blood glucose monitoring lancets     100 each    1 each by Lancet route 2 times daily    Diabetes mellitus, type 2 (H)       CALCIUM 500 + D PO      1 tablet twice daily        FLAX SEED OIL PO      twice daily        losartan 50 MG tablet    COZAAR    90 tablet    TAKE 1 TABLET (50 MG) BY MOUTH DAILY    Tinnitus, right       metFORMIN 500 MG 24 hr tablet    GLUCOPHAGE-XR    360 tablet    Take 2 tablets (1,000 mg) by mouth 2 times daily (with meals)    Type 2 diabetes mellitus with hyperlipidemia (H)       oxyCODONE IR 5 MG tablet    ROXICODONE    15 tablet    Take 1-2 tablets (5-10 mg) by mouth every 6 hours as needed for moderate to severe pain    Ventral hernia with obstruction and without gangrene       pravastatin 40 MG tablet    PRAVACHOL    30 tablet    Take 1 tablet (40 mg) by mouth daily    Hyperlipidemia LDL goal <100       sulfamethoxazole-trimethoprim 800-160 MG per tablet    BACTRIM DS/SEPTRA DS    6 tablet    Take 1 tablet by mouth 2 times daily for 3 days    Acute cystitis without hematuria

## 2018-08-10 LAB
BACTERIA SPEC CULT: ABNORMAL
Lab: ABNORMAL
SPECIMEN SOURCE: ABNORMAL

## 2018-08-10 NOTE — PROGRESS NOTES
Sheron Mann    Your urine culture results show sensitivity to the current antibiotic. If you are still having symptoms please let me know. Please let us know if you have any questions.     Thanks for coming in to the clinic.    KAMAR Monroe CNP

## 2018-08-15 DIAGNOSIS — E78.5 TYPE 2 DIABETES MELLITUS WITH HYPERLIPIDEMIA (H): ICD-10-CM

## 2018-08-15 DIAGNOSIS — E11.69 TYPE 2 DIABETES MELLITUS WITH HYPERLIPIDEMIA (H): ICD-10-CM

## 2018-08-15 NOTE — TELEPHONE ENCOUNTER
"metFORMIN (GLUCOPHAGE-XR) 500 MG 24 hr tablet        Last Written Prescription Date:  8/2/17  Last Fill Quantity: 360,   # refills: 3  Last Office Visit: 7/11/18  Future Office visit:       Requested Prescriptions   Pending Prescriptions Disp Refills     metFORMIN (GLUCOPHAGE-XR) 500 MG 24 hr tablet 360 tablet 3     Sig: Take 2 tablets (1,000 mg) by mouth 2 times daily (with meals)    Biguanide Agents Failed    8/15/2018  4:21 PM       Failed - Blood pressure less than 140/90 in past 6 months    BP Readings from Last 3 Encounters:   07/11/18 144/70   06/26/18 131/68   08/02/17 148/89                Passed - Patient has documented LDL within the past 12 mos.    Recent Labs   Lab Test  07/11/18   0913   LDL  62            Passed - Patient has had a Microalbumin in the past 12 mos.    Recent Labs   Lab Test  07/11/18   0921   MICROL  432   UMALCR  240.00*            Passed - Patient is age 10 or older       Passed - Patient has documented A1c within the specified period of time.    If HgbA1C is 8 or greater, it needs to be on file within the past 3 months.  If less than 8, must be on file within the past 6 months.     Recent Labs   Lab Test  07/11/18   0913   A1C  8.1*            Passed - Patient's CR is NOT>1.4 OR Patient's EGFR is NOT<45 within past 12 mos.    Recent Labs   Lab Test  06/24/18   0730   GFRESTIMATED  >90   GFRESTBLACK  >90       Recent Labs   Lab Test  06/24/18   0730   CR  0.52            Passed - Patient does NOT have a diagnosis of CHF.       Passed - Patient is not pregnant       Passed - Patient has not had a positive pregnancy test within the past 12 mos.        Passed - Recent (6 mo) or future (30 days) visit within the authorizing provider's specialty    Patient had office visit in the last 6 months or has a visit in the next 30 days with authorizing provider or within the authorizing provider's specialty.  See \"Patient Info\" tab in inbasket, or \"Choose Columns\" in Meds & Orders section of the " refill encounter.

## 2018-08-16 RX ORDER — METFORMIN HCL 500 MG
1000 TABLET, EXTENDED RELEASE 24 HR ORAL 2 TIMES DAILY WITH MEALS
Qty: 360 TABLET | Refills: 1 | Status: SHIPPED | OUTPATIENT
Start: 2018-08-16 | End: 2018-10-16

## 2018-08-20 ENCOUNTER — TRANSFERRED RECORDS (OUTPATIENT)
Dept: HEALTH INFORMATION MANAGEMENT | Facility: CLINIC | Age: 73
End: 2018-08-20

## 2018-08-23 DIAGNOSIS — H93.11 TINNITUS, RIGHT: ICD-10-CM

## 2018-08-23 RX ORDER — LOSARTAN POTASSIUM 50 MG/1
TABLET ORAL
Qty: 90 TABLET | OUTPATIENT
Start: 2018-08-23

## 2018-08-23 NOTE — TELEPHONE ENCOUNTER
Duplicate.  Medication Detail      Disp Refills Start End STEVO   losartan (COZAAR) 50 MG tablet 90 tablet 2 8/3/2018  No   Sig: TAKE 1 TABLET (50 MG) BY MOUTH DAILY   Class: E-Prescribe   Order: [id number]   E-Prescribing Status: Receipt confirmed by pharmacy (8/3/2018  3:23 PM CDT)     Olimpia EDDY RN

## 2018-08-23 NOTE — TELEPHONE ENCOUNTER
"Requested Prescriptions   Pending Prescriptions Disp Refills     losartan (COZAAR) 50 MG tablet [Pharmacy Med Name: LOSARTAN POTASSIUM 50 MG TABLET] 90 tablet      Sig: TAKE 1 TABLET (50 MG) BY MOUTH DAILY    Angiotensin-II Receptors Failed    8/23/2018  9:06 AM       Failed - Blood pressure under 140/90 in past 12 months    BP Readings from Last 3 Encounters:   07/11/18 144/70   06/26/18 131/68   08/02/17 148/89                Passed - Recent (12 mo) or future (30 days) visit within the authorizing provider's specialty    Patient had office visit in the last 12 months or has a visit in the next 30 days with authorizing provider or within the authorizing provider's specialty.  See \"Patient Info\" tab in inbasket, or \"Choose Columns\" in Meds & Orders section of the refill encounter.           Passed - Patient is age 18 or older       Passed - No active pregnancy on record       Passed - Normal serum creatinine on file in past 12 months    Recent Labs   Lab Test  06/24/18   0730   CR  0.52            Passed - Normal serum potassium on file in past 12 months    Recent Labs   Lab Test  06/24/18   0730   POTASSIUM  3.6                   Passed - No positive pregnancy test in past 12 months        Last Written Prescription Date:  8/3/18  Last Fill Quantity: 90,  # refills: 2   Last office visit: Virtual visit 8/8/2018 with HEATHER Dia CNP  Future Office Visit:      "

## 2018-08-26 ENCOUNTER — HEALTH MAINTENANCE LETTER (OUTPATIENT)
Age: 73
End: 2018-08-26

## 2018-09-08 DIAGNOSIS — E11.9 TYPE 2 DIABETES, HBA1C GOAL < 7% (H): ICD-10-CM

## 2018-09-10 NOTE — TELEPHONE ENCOUNTER
"ACCU-CHEK JULIO CÉSAR PLUS STRIP        Last Written Prescription Date:  8/8/17  Last Fill Quantity: 200,   # refills: 3  Last Office Visit: 7/11/18  Future Office visit:       Requested Prescriptions   Pending Prescriptions Disp Refills     ACCU-CHEK JULIO CÉSAR PLUS test strip [Pharmacy Med Name: ACCU-CHEK JULIO CÉSAR PLUS  STRIP] 100 strip      Sig: TWICE DAILY    Diabetic Supplies Protocol Passed    9/8/2018  6:59 AM       Passed - Patient is 18 years of age or older       Passed - Recent (6 mo) or future (30 days) visit within the authorizing provider's specialty    Patient had office visit in the last 6 months or has a visit in the next 30 days with authorizing provider.  See \"Patient Info\" tab in inbasket, or \"Choose Columns\" in Meds & Orders section of the refill encounter.              "

## 2018-09-10 NOTE — TELEPHONE ENCOUNTER
Prescription approved per [facility] [place].  Aramis Torres RN

## 2018-10-05 DIAGNOSIS — E78.5 HYPERLIPIDEMIA LDL GOAL <100: ICD-10-CM

## 2018-10-05 RX ORDER — PRAVASTATIN SODIUM 40 MG
TABLET ORAL
Qty: 90 TABLET | Refills: 3 | Status: SHIPPED | OUTPATIENT
Start: 2018-10-05 | End: 2019-08-22

## 2018-10-05 NOTE — TELEPHONE ENCOUNTER
Prescription approved per [facility] Refill Protocol.    Mackenzie Briones RN

## 2018-10-05 NOTE — TELEPHONE ENCOUNTER
"Requested Prescriptions   Pending Prescriptions Disp Refills     pravastatin (PRAVACHOL) 40 MG tablet [Pharmacy Med Name: PRAVASTATIN SODIUM 40 MG TABLET] 90 tablet     Last Written Prescription Date:  6/26/18  Last Fill Quantity: 30,  # refills: 0   Last office visit: 8/8/2018 with prescribing provider:  jacques   Future Office Visit:   Next 5 appointments (look out 90 days)     Oct 16, 2018  8:00 AM CDT   [facility]ota Art [facility]    [address] [address]   [phone]                  Sig: TAKE ONE TABLET EVERY DAY    Statins Protocol Passed    10/5/2018  9:00 AM       Passed - LDL on file in past 12 months    Recent Labs   Lab Test  07/11/18   0913   LDL  62            Passed - No abnormal creatine kinase in past 12 months    No lab results found.            Passed - Recent (12 mo) or future (30 days) visit within the authorizing provider's specialty    Patient had office visit in the last 12 months or has a visit in the next 30 days with authorizing provider or within the authorizing provider's specialty.  See \"Patient Info\" tab in inbasket, or \"Choose Columns\" in Meds & Orders section of the refill encounter.           Passed - Patient is age 18 or older       Passed - No active pregnancy on record       Passed - No positive pregnancy test in past 12 months          "

## 2018-10-15 NOTE — PROGRESS NOTES
SUBJECTIVE:   Mackenzie Trinidad is a 73 year old female who presents to clinic today for the following health issues:        Diabetes Follow-up  Metformin 1,000mg bid  Patient is checking blood sugars: 1-2 times daily.    Blood sugar testing frequency justification: Uncontrolled diabetes  Results are as follows:         140-190    Diabetic concerns: None     Symptoms of hypoglycemia (low blood sugar): none     Paresthesias (numbness or burning in feet) or sores: No     Date of last diabetic eye exam: 8/2018    Diabetes Management Resources    Hyperlipidemia Follow-Up  Pravastatin 40mg qd    Rate your low fat/cholesterol diet?: not monitoring fat    Taking statin?  Yes, no muscle aches from statin    Other lipid medications/supplements?:  none    Hypertension Follow-up  Losartan 50mg qd    Outpatient blood pressures are not being checked.    Low Salt Diet: not monitoring salt    BP Readings from Last 2 Encounters:   07/11/18 144/70   06/26/18 131/68     Hemoglobin A1C (%)   Date Value   07/11/2018 8.1 (H)   08/02/2017 7.5 (H)     LDL Cholesterol Calculated (mg/dL)   Date Value   07/11/2018 62   08/02/2017 75     - Colonoscopy last completed 6/20/2013, 1 sessile polyp found and removed. She is on a q 5 year schedule.        Amount of exercise or physical activity: 2-3 days/week walking on treadmill at [facility]    Problems taking medications regularly: No    Medication side effects: none    Diet: carbohydrate counting        Problem list and histories reviewed & adjusted, as indicated.  Additional history: as documented  Blood pressure at home the diastolic reading is more in the 70   She also was up very late last night and didn;'t get as much sleep     Patient Active Problem List   Diagnosis     FAMILY HISTORY OF OSTEOPORSIS     FAMILY HISTORY OF DIABETES MELLITUS     FAMILY HISTORY OF COLON CANCER     MYOPIA     PRESBYOPIA     Diverticulosis of large intestine     Hyperlipidemia LDL goal <100     Advanced directives,  "counseling/discussion     Type 2 diabetes, HbA1c goal < 7% (H)     Type 2 diabetes mellitus with hyperlipidemia (H)     Benign essential hypertension     Ventral hernia     Past Surgical History:   Procedure Laterality Date     ARTHRODESIS FOOT Right 10/23/2014    Procedure: ARTHRODESIS FOOT;  Surgeon: Mauri Gomez DPM;  Location: [facility] OR     COLONOSCOPY  10/10/2007    Repeat in 5 years     LAPAROSCOPIC HERNIORRHAPHY VENTRAL N/A 6/25/2018    Procedure: LAPAROSCOPIC HERNIORRHAPHY VENTRAL;  LAPAROSCOPIC HERNIORRHAPHY VENTRAL ;  Surgeon: Jose Ibrahim MD;  Location: [facility] OR     SURGICAL HISTORY OF -   1996    Hysterectomy     SURGICAL HISTORY OF -   3/2002    Right hammer toe/bunionectomy       Social History   Substance Use Topics     Smoking status: Former Smoker     Quit date: 1/1/1971     Smokeless tobacco: Never Used     Alcohol use Yes      Comment: social     Family History   Problem Relation Age of Onset     Hypertension Mother      Osteoporosis Mother      HEART DISEASE Mother 94     small heart attack     Cancer Father      bone cancer     Hypertension Brother      Lipids Sister      Diabetes Sister      diet controlled     Cancer - colorectal Other      Hypertension Brother      Diabetes Brother      Lipids Brother      Cancer - colorectal Brother      Lipids Brother      Hypertension Sister      Asthma No family hx of      C.A.D. No family hx of      Cerebrovascular Disease No family hx of      Breast Cancer No family hx of      Prostate Cancer No family hx of            Reviewed and updated as needed this visit by clinical staff       Reviewed and updated as needed this visit by Provider         ROS:  Constitutional, HEENT, cardiovascular, pulmonary, gi and gu systems are negative, except as otherwise noted.    OBJECTIVE:     /84  Pulse 76  Ht 5' 5\" (1.651 m)  Wt 179 lb 1 oz (81.2 kg)  BMI 29.8 kg/m2  Body mass index is 29.8 kg/(m^2).  GENERAL APPEARANCE: healthy, alert and no " distress    Diagnostic Test Results:  Results for orders placed or performed in visit on 10/16/18 (from the past 24 hour(s))   Hemoglobin A1c   Result Value Ref Range    Hemoglobin A1C 8.2 (H) 0 - 5.6 %       ASSESSMENT/PLAN:             1. Type 2 diabetes, HbA1c goal < 7% (H)  Stable she is going to work on her diet and exercise   - Hemoglobin A1c  - Basic metabolic panel    2. Advanced directives, counseling/discussion      3. Special screening for malignant neoplasms, colon    - GASTROENTEROLOGY ADULT REF PROCEDURE ONLY [facility] [phone]; [place] [facility] Surgeon    4. Type 2 diabetes mellitus with hyperlipidemia (H)    - metFORMIN (GLUCOPHAGE-XR) 500 MG 24 hr tablet; Take 2 tablets (1,000 mg) by mouth 2 times daily (with meals)  Dispense: 360 tablet; Refill: 1    5. Need for vaccination    - VACCINE ADMINISTRATION, INITIAL  - PNEUMOCOCCAL CONJ VACCINE 13 VALENT IM    Work on weight loss  Regular exercise    Dorota Art MD  [facility]

## 2018-10-16 ENCOUNTER — OFFICE VISIT (OUTPATIENT)
Dept: FAMILY MEDICINE | Facility: CLINIC | Age: 73
End: 2018-10-16
Payer: COMMERCIAL

## 2018-10-16 VITALS
WEIGHT: 179.06 LBS | SYSTOLIC BLOOD PRESSURE: 136 MMHG | HEIGHT: 65 IN | HEART RATE: 76 BPM | DIASTOLIC BLOOD PRESSURE: 84 MMHG | BODY MASS INDEX: 29.83 KG/M2

## 2018-10-16 DIAGNOSIS — Z23 NEED FOR VACCINATION: ICD-10-CM

## 2018-10-16 DIAGNOSIS — Z12.11 SPECIAL SCREENING FOR MALIGNANT NEOPLASMS, COLON: ICD-10-CM

## 2018-10-16 DIAGNOSIS — E78.5 TYPE 2 DIABETES MELLITUS WITH HYPERLIPIDEMIA (H): ICD-10-CM

## 2018-10-16 DIAGNOSIS — E11.69 TYPE 2 DIABETES MELLITUS WITH HYPERLIPIDEMIA (H): ICD-10-CM

## 2018-10-16 DIAGNOSIS — Z71.89 ADVANCED DIRECTIVES, COUNSELING/DISCUSSION: Primary | ICD-10-CM

## 2018-10-16 LAB
ANION GAP SERPL CALCULATED.3IONS-SCNC: 6 MMOL/L (ref 3–14)
BUN SERPL-MCNC: 11 MG/DL (ref 7–30)
CALCIUM SERPL-MCNC: 8.9 MG/DL (ref 8.5–10.1)
CHLORIDE SERPL-SCNC: 103 MMOL/L (ref 94–109)
CO2 SERPL-SCNC: 27 MMOL/L (ref 20–32)
CREAT SERPL-MCNC: 0.6 MG/DL (ref 0.52–1.04)
GFR SERPL CREATININE-BSD FRML MDRD: >90 ML/MIN/1.7M2
GLUCOSE SERPL-MCNC: 216 MG/DL (ref 70–99)
HBA1C MFR BLD: 8.2 % (ref 0–5.6)
POTASSIUM SERPL-SCNC: 4.1 MMOL/L (ref 3.4–5.3)
SODIUM SERPL-SCNC: 136 MMOL/L (ref 133–144)

## 2018-10-16 PROCEDURE — 36415 COLL VENOUS BLD VENIPUNCTURE: CPT | Performed by: FAMILY MEDICINE

## 2018-10-16 PROCEDURE — G0009 ADMIN PNEUMOCOCCAL VACCINE: HCPCS | Performed by: FAMILY MEDICINE

## 2018-10-16 PROCEDURE — 80048 BASIC METABOLIC PNL TOTAL CA: CPT | Performed by: FAMILY MEDICINE

## 2018-10-16 PROCEDURE — 90670 PCV13 VACCINE IM: CPT | Performed by: FAMILY MEDICINE

## 2018-10-16 PROCEDURE — 83036 HEMOGLOBIN GLYCOSYLATED A1C: CPT | Performed by: FAMILY MEDICINE

## 2018-10-16 PROCEDURE — 99213 OFFICE O/P EST LOW 20 MIN: CPT | Mod: 25 | Performed by: FAMILY MEDICINE

## 2018-10-16 RX ORDER — METFORMIN HCL 500 MG
1000 TABLET, EXTENDED RELEASE 24 HR ORAL 2 TIMES DAILY WITH MEALS
Qty: 360 TABLET | Refills: 1 | Status: SHIPPED | OUTPATIENT
Start: 2018-10-16 | End: 2019-08-09

## 2018-10-16 NOTE — MR AVS SNAPSHOT
After Visit Summary   10/16/2018    Mackenzie Trinidad    MRN: [id number]           Patient Information     Date Of Birth          1945        Visit Information        Provider Department      10/16/2018 8:00 AM Dorota Art MD [facility]        Today's Diagnoses     Advanced directives, counseling/discussion    -  1    Type 2 diabetes, HbA1c goal < 7% (H)        Special screening for malignant neoplasms, colon        Type 2 diabetes mellitus with hyperlipidemia (H)           Follow-ups after your visit        Additional Services     GASTROENTEROLOGY ADULT REF PROCEDURE ONLY [facility] [phone]; [place] [facility] Surgeon       Last Lab Result: Creatinine (mg/dL)       Date                     Value                 06/24/2018               0.52             ----------  Body mass index is 29.8 kg/(m^2).     Needed:  No  Language:  English    Patient will be contacted to schedule procedure.     Please be aware that coverage of these services is subject to the terms and limitations of your health insurance plan.  Call member services at your health plan with any benefit or coverage questions.  Any procedures must be performed at a [place] facility OR coordinated by your clinic's referral office.    Please bring the following with you to your appointment:    (1) Any X-Rays, CTs or MRIs which have been performed.  Contact the facility where they were done to arrange for  prior to your scheduled appointment.    (2) List of current medications   (3) This referral request   (4) Any documents/labs given to you for this referral                  Who to contact     Normal or non-critical lab and imaging results will be communicated to you by MyChart, letter or phone within 4 business days after the clinic has received the results. If you do not hear from us within 7 days, please contact the clinic through MyChart or phone. If you have a critical or abnormal lab result,  "we will notify you by phone as soon as possible.  Submit refill requests through [employer] or call your pharmacy and they will forward the refill request to us. Please allow 3 business days for your refill to be completed.          If you need to speak with a  for additional information , please call: [phone]             Additional Information About Your Visit        [employer] Information     [employer] gives you secure access to your electronic health record. If you see a primary care provider, you can also send messages to your care team and make appointments. If you have questions, please call your primary care clinic.  If you do not have a primary care provider, please call [phone] and they will assist you.        Care EveryWhere ID     This is your Care EveryWhere ID. This could be used by other organizations to access your [facility] medical records  [id number]        Your Vitals Were     Pulse Height BMI (Body Mass Index)             76 5' 5\" (1.651 m) 29.8 kg/m2          Blood Pressure from Last 3 Encounters:   10/16/18 136/84   07/11/18 144/70   06/26/18 131/68    Weight from Last 3 Encounters:   10/16/18 179 lb 1 oz (81.2 kg)   07/11/18 181 lb 14.4 oz (82.5 kg)   08/02/17 182 lb 14.4 oz (83 kg)              We Performed the Following     Basic metabolic panel     GASTROENTEROLOGY ADULT REF PROCEDURE ONLY [facility] [phone]; [place] [facility] Surgeon     Hemoglobin A1c          Today's Medication Changes          These changes are accurate as of 10/16/18  8:45 AM.  If you have any questions, ask your nurse or doctor.               Stop taking these medicines if you haven't already. Please contact your care team if you have questions.     oxyCODONE IR 5 MG tablet   Commonly known as:  ROXICODONE   Stopped by:  Dorota Art MD                Where to get your medicines      These medications were sent to [facility], - [place], [place] - [address]  [address] [facility] [address]     Phone:  [phone]     metFORMIN 500 MG 24 hr tablet                Primary Care Provider Office Phone # Fax #    Dorota Art [phone] [address] [facility] [address]        Equal Access to Services     JENNI DAY : Hadii aad ku hadasho Soomaali, waaxda luqadaha, qaybta kaalmada adeegyada, waxay idiin hayaan adeeg maurilio laMohamudbrynnn . So [facility] [phone].    ATENCIÓN: Si habla español, tiene a proctor disposición servicios gratuitos de asistencia lingüística. Llame al [phone].    We comply with applicable federal civil rights laws and [place] laws. We do not discriminate on the basis of race, color, national origin, age, disability, sex, sexual orientation, or gender identity.            Thank you!     Thank you for choosing [facility]  for your care. Our goal is always to provide you with excellent care. Hearing back from our patients is one way we can continue to improve our services. Please take a few minutes to complete the written survey that you may receive in the mail after your visit with us. Thank you!             Your Updated Medication List - Protect others around you: Learn how to safely use, store and throw away your medicines at www.disposemymeds.org.          This list is accurate as of 10/16/18  8:45 AM.  Always use your most recent med list.                   Brand Name Dispense Instructions for use Diagnosis    aspirin 81 MG tablet      Take  by mouth daily.        blood glucose monitoring lancets     100 each    1 each by Lancet route 2 times daily    Diabetes mellitus, type 2 (H)       blood glucose monitoring test strip    ACCU-CHEK JULIO CÉSAR PLUS    200 strip    1 strip by In Vitro route 2 times daily    Type 2 diabetes, HbA1c goal < 7% (H)       CALCIUM 500 + D PO      1 tablet twice daily        FLAX SEED OIL PO      twice daily        losartan 50 MG tablet    COZAAR    90 tablet    TAKE  1 TABLET (50 MG) BY MOUTH DAILY    Tinnitus, right       metFORMIN 500 MG 24 hr tablet    GLUCOPHAGE-XR    360 tablet    Take 2 tablets (1,000 mg) by mouth 2 times daily (with meals)    Type 2 diabetes mellitus with hyperlipidemia (H)       pravastatin 40 MG tablet    PRAVACHOL    90 tablet    TAKE ONE TABLET EVERY DAY    Hyperlipidemia LDL goal <100

## 2018-12-05 ENCOUNTER — ANESTHESIA EVENT (OUTPATIENT)
Dept: GASTROENTEROLOGY | Facility: CLINIC | Age: 73
End: 2018-12-05
Payer: MEDICARE

## 2018-12-05 ASSESSMENT — LIFESTYLE VARIABLES: TOBACCO_USE: 1

## 2018-12-05 NOTE — ANESTHESIA PREPROCEDURE EVALUATION
Anesthesia Evaluation     . Pt has had prior anesthetic. Type: MAC and General    History of anesthetic complications   - PONV        ROS/MED HX    ENT/Pulmonary:     (+)tobacco use, Past use , . .    Neurologic:  - neg neurologic ROS     Cardiovascular:     (+) Dyslipidemia, hypertension----. : . . . :. . Previous cardiac testing date:results:date: results:ECG reviewed date:6/18 results:SB date: results:          METS/Exercise Tolerance:  >4 METS   Hematologic:  - neg hematologic  ROS       Musculoskeletal:  - neg musculoskeletal ROS       GI/Hepatic:     (+) Other GI/Hepatic diverticulosis      Renal/Genitourinary:  - ROS Renal section negative       Endo:     (+) type II DM Last HgA1c: 8.2 date: 10/16/18 Not using insulin - not using insulin pump Obesity, .      Psychiatric:  - neg psychiatric ROS       Infectious Disease:  - neg infectious disease ROS       Malignancy:      - no malignancy   Other:    - neg other ROS                 Physical Exam  Normal systems: cardiovascular and pulmonary    Airway   Mallampati: I  TM distance: >3 FB  Neck ROM: full    Dental   (+) caps    Cardiovascular       Pulmonary                     Anesthesia Plan      History & Physical Review  History and physical reviewed and following examination; no interval change.    ASA Status:  3 .    NPO Status:  > 4 hours    Plan for MAC Maintenance will be Balanced.  Reason for MAC:  Deep or markedly invasive procedure (G8)         Postoperative Care      Consents  Anesthetic plan, risks, benefits and alternatives discussed with:  Patient..                          .

## 2018-12-06 ENCOUNTER — ANESTHESIA (OUTPATIENT)
Dept: GASTROENTEROLOGY | Facility: CLINIC | Age: 73
End: 2018-12-06
Payer: MEDICARE

## 2018-12-06 ENCOUNTER — SURGERY (OUTPATIENT)
Age: 73
End: 2018-12-06

## 2018-12-06 ENCOUNTER — HOSPITAL ENCOUNTER (OUTPATIENT)
Facility: CLINIC | Age: 73
Discharge: HOME | End: 2018-12-06
Attending: SURGERY | Admitting: SURGERY
Payer: MEDICARE

## 2018-12-06 VITALS
DIASTOLIC BLOOD PRESSURE: 94 MMHG | HEART RATE: 81 BPM | TEMPERATURE: 97.8 F | WEIGHT: 179 LBS | OXYGEN SATURATION: 98 % | SYSTOLIC BLOOD PRESSURE: 119 MMHG | RESPIRATION RATE: 16 BRPM | BODY MASS INDEX: 29.82 KG/M2 | HEIGHT: 65 IN

## 2018-12-06 LAB
COLONOSCOPY: NORMAL
GLUCOSE BLDC GLUCOMTR-MCNC: 156 MG/DL (ref 70–99)

## 2018-12-06 PROCEDURE — 25000125 ZZHC RX 250: Performed by: NURSE ANESTHETIST, CERTIFIED REGISTERED

## 2018-12-06 PROCEDURE — 25000125 ZZHC RX 250: Performed by: SURGERY

## 2018-12-06 PROCEDURE — 25000128 H RX IP 250 OP 636: Performed by: SURGERY

## 2018-12-06 PROCEDURE — 25000128 H RX IP 250 OP 636: Performed by: NURSE ANESTHETIST, CERTIFIED REGISTERED

## 2018-12-06 PROCEDURE — 82962 GLUCOSE BLOOD TEST: CPT

## 2018-12-06 PROCEDURE — G0121 COLON CA SCRN NOT HI RSK IND: HCPCS | Performed by: SURGERY

## 2018-12-06 PROCEDURE — 45378 DIAGNOSTIC COLONOSCOPY: CPT | Performed by: SURGERY

## 2018-12-06 PROCEDURE — 37000008 ZZH ANESTHESIA TECHNICAL FEE, 1ST 30 MIN: Performed by: SURGERY

## 2018-12-06 RX ORDER — GLYCOPYRROLATE 0.2 MG/ML
INJECTION, SOLUTION INTRAMUSCULAR; INTRAVENOUS PRN
Status: DISCONTINUED | OUTPATIENT
Start: 2018-12-06 | End: 2018-12-06

## 2018-12-06 RX ORDER — PROPOFOL 10 MG/ML
INJECTION, EMULSION INTRAVENOUS PRN
Status: DISCONTINUED | OUTPATIENT
Start: 2018-12-06 | End: 2018-12-06

## 2018-12-06 RX ORDER — SODIUM CHLORIDE, SODIUM LACTATE, POTASSIUM CHLORIDE, CALCIUM CHLORIDE 600; 310; 30; 20 MG/100ML; MG/100ML; MG/100ML; MG/100ML
INJECTION, SOLUTION INTRAVENOUS CONTINUOUS
Status: DISCONTINUED | OUTPATIENT
Start: 2018-12-06 | End: 2018-12-06 | Stop reason: HOSPADM

## 2018-12-06 RX ORDER — PROPOFOL 10 MG/ML
INJECTION, EMULSION INTRAVENOUS CONTINUOUS PRN
Status: DISCONTINUED | OUTPATIENT
Start: 2018-12-06 | End: 2018-12-06

## 2018-12-06 RX ORDER — ONDANSETRON 2 MG/ML
4 INJECTION INTRAMUSCULAR; INTRAVENOUS
Status: DISCONTINUED | OUTPATIENT
Start: 2018-12-06 | End: 2018-12-06 | Stop reason: HOSPADM

## 2018-12-06 RX ORDER — LIDOCAINE 40 MG/G
CREAM TOPICAL
Status: DISCONTINUED | OUTPATIENT
Start: 2018-12-06 | End: 2018-12-06 | Stop reason: HOSPADM

## 2018-12-06 RX ORDER — LIDOCAINE HYDROCHLORIDE 10 MG/ML
INJECTION, SOLUTION INFILTRATION; PERINEURAL PRN
Status: DISCONTINUED | OUTPATIENT
Start: 2018-12-06 | End: 2018-12-06

## 2018-12-06 RX ADMIN — LIDOCAINE HYDROCHLORIDE 50 MG: 10 INJECTION, SOLUTION INFILTRATION; PERINEURAL at 08:45

## 2018-12-06 RX ADMIN — PROPOFOL 200 MCG/KG/MIN: 10 INJECTION, EMULSION INTRAVENOUS at 08:45

## 2018-12-06 RX ADMIN — GLYCOPYRROLATE 0.2 MG: 0.2 INJECTION, SOLUTION INTRAMUSCULAR; INTRAVENOUS at 08:45

## 2018-12-06 RX ADMIN — PROPOFOL 30 MG: 10 INJECTION, EMULSION INTRAVENOUS at 08:52

## 2018-12-06 RX ADMIN — PROPOFOL 70 MG: 10 INJECTION, EMULSION INTRAVENOUS at 08:46

## 2018-12-06 RX ADMIN — SODIUM CHLORIDE, POTASSIUM CHLORIDE, SODIUM LACTATE AND CALCIUM CHLORIDE: 600; 310; 30; 20 INJECTION, SOLUTION INTRAVENOUS at 08:40

## 2018-12-06 RX ADMIN — LIDOCAINE HYDROCHLORIDE 0.1 ML: 10 INJECTION, SOLUTION EPIDURAL; INFILTRATION; INTRACAUDAL; PERINEURAL at 08:40

## 2018-12-06 NOTE — H&P
"73 year old year old female here for colonoscopy for screening.    Patient Active Problem List   Diagnosis     FAMILY HISTORY OF OSTEOPORSIS     FAMILY HISTORY OF DIABETES MELLITUS     FAMILY HISTORY OF COLON CANCER     MYOPIA     PRESBYOPIA     Diverticulosis of large intestine     Hyperlipidemia LDL goal <100     Advanced directives, counseling/discussion     Type 2 diabetes, HbA1c goal < 7% (H)     Type 2 diabetes mellitus with hyperlipidemia (H)     Benign essential hypertension     Ventral hernia       Past Medical History:   Diagnosis Date     Acute pharyngitis      Gynecological examination      Need for prophylactic hormone replacement therapy (postmenopausal)      PONV (postoperative nausea and vomiting)        Past Surgical History:   Procedure Laterality Date     ARTHRODESIS FOOT Right 10/23/2014    Procedure: ARTHRODESIS FOOT;  Surgeon: Mauri Gomez DPM;  Location: [facility] OR     COLONOSCOPY  10/10/2007    Repeat in 5 years     LAPAROSCOPIC HERNIORRHAPHY VENTRAL N/A 6/25/2018    Procedure: LAPAROSCOPIC HERNIORRHAPHY VENTRAL;  LAPAROSCOPIC HERNIORRHAPHY VENTRAL ;  Surgeon: Jose Ibrahim MD;  Location:  OR     SURGICAL HISTORY OF -   1996    Hysterectomy     SURGICAL HISTORY OF -   3/2002    Right hammer toe/bunionectomy       @[facility]@    No current outpatient prescriptions on file.       Allergies   Allergen Reactions     Nkda [No Known Drug Allergies]        Pt reports that she quit smoking about 47 years ago. She has never used smokeless tobacco. She reports that she drinks alcohol. She reports that she does not use illicit drugs.    Exam:  Ht 1.651 m (5' 5\")  Wt 81.2 kg (179 lb)  BMI 29.79 kg/m2    Awake, Alert OX3  Lungs - CTA bilaterally  CV - RRR, no murmurs, distal pulses intact  Abd - soft, non-distended, non-tender, +BS  Extr - No cyanosis or edema    A/P 73 year old year old female in need of colonoscopy for screening. Risks, benefits, alternatives, and complications were discussed " including the possibility of perforation and the patient agreed to proceed    Magno Huber MD

## 2018-12-06 NOTE — ANESTHESIA CARE TRANSFER NOTE
Patient: Mackenzie Trinidad    Procedure(s):  COLONOSCOPY    Diagnosis: screening  Diagnosis Additional Information: No value filed.    Anesthesia Type:   No value filed.     Note:  Airway :Room Air  Patient transferred to:Phase II        Vitals: (Last set prior to Anesthesia Care Transfer)    CRNA VITALS  12/6/2018 0826 - 12/6/2018 0856      12/6/2018             Pulse: 80    Ht Rate: 80    SpO2: 100 %                Electronically Signed By: KAMAR Riojas CRNA  December 6, 2018  8:56 AM

## 2018-12-06 NOTE — ANESTHESIA POSTPROCEDURE EVALUATION
Patient: Mackenzie Trinidad    Procedure(s):  COLONOSCOPY    Diagnosis:screening  Diagnosis Additional Information: No value filed.    Anesthesia Type:  No value filed.    Note:  Anesthesia Post Evaluation    Patient location during evaluation: Phase 2  Patient participation: Able to fully participate in evaluation  Level of consciousness: awake  Pain management: adequate  Airway patency: patent  Cardiovascular status: acceptable and hemodynamically stable  Respiratory status: acceptable, room air and spontaneous ventilation  Hydration status: acceptable  PONV: none     Anesthetic complications: None          Last vitals:  Vitals:    12/06/18 0802   BP: 139/86   Resp: 16   Temp: 36.6  C (97.8  F)   SpO2: 98%         Electronically Signed By: KAMAR Riojas CRNA  December 6, 2018  8:55 AM

## 2019-01-03 ENCOUNTER — TRANSFERRED RECORDS (OUTPATIENT)
Dept: HEALTH INFORMATION MANAGEMENT | Facility: CLINIC | Age: 74
End: 2019-01-03

## 2019-05-08 ENCOUNTER — TRANSFERRED RECORDS (OUTPATIENT)
Dept: HEALTH INFORMATION MANAGEMENT | Facility: CLINIC | Age: 74
End: 2019-05-08

## 2019-05-22 ENCOUNTER — HOSPITAL ENCOUNTER (OUTPATIENT)
Dept: MAMMOGRAPHY | Facility: CLINIC | Age: 74
Discharge: HOME | End: 2019-05-22
Attending: FAMILY MEDICINE | Admitting: FAMILY MEDICINE
Payer: MEDICARE

## 2019-05-22 DIAGNOSIS — Z12.31 VISIT FOR SCREENING MAMMOGRAM: ICD-10-CM

## 2019-05-22 PROCEDURE — 77063 BREAST TOMOSYNTHESIS BI: CPT

## 2019-07-30 ENCOUNTER — OFFICE VISIT (OUTPATIENT)
Dept: FAMILY MEDICINE | Facility: CLINIC | Age: 74
End: 2019-07-30
Payer: MEDICARE

## 2019-07-30 VITALS
DIASTOLIC BLOOD PRESSURE: 86 MMHG | WEIGHT: 180.6 LBS | SYSTOLIC BLOOD PRESSURE: 152 MMHG | HEIGHT: 65 IN | BODY MASS INDEX: 30.09 KG/M2 | TEMPERATURE: 98.6 F | OXYGEN SATURATION: 97 % | HEART RATE: 77 BPM

## 2019-07-30 DIAGNOSIS — I10 BENIGN ESSENTIAL HYPERTENSION: ICD-10-CM

## 2019-07-30 DIAGNOSIS — K60.2 PERIANAL FISSURE: Primary | ICD-10-CM

## 2019-07-30 PROCEDURE — 99213 OFFICE O/P EST LOW 20 MIN: CPT | Performed by: NURSE PRACTITIONER

## 2019-07-30 ASSESSMENT — MIFFLIN-ST. JEOR: SCORE: 1330.08

## 2019-07-30 NOTE — PROGRESS NOTES
Mackenzie Trinidad is a 74 year old female who presents to clinic today for the following health issues:    HPI   Rectal problem       Duration: 1 month     Description (location/character/radiation): blood when she wipes after having a bowel movement and very itchy     Intensity:  mild    Accompanying signs and symptoms: none    History (similar episodes/previous evaluation): None    Precipitating or alleviating factors: None    Therapies tried and outcome: vaseline does not help      Denies blood in toilet or stools, only on TP with wiping.  BMs are normal with no straining.  Denies pain with bowel movements.  Recent colonoscopy in 12/18 was normal.      Patient has elevated BP today but states that she had coffee with friends this morning and knew it would be high.    Patient Active Problem List   Diagnosis     FAMILY HISTORY OF OSTEOPORSIS     FAMILY HISTORY OF DIABETES MELLITUS     FAMILY HISTORY OF COLON CANCER     MYOPIA     PRESBYOPIA     Diverticulosis of large intestine     Hyperlipidemia LDL goal <100     Advanced directives, counseling/discussion     Type 2 diabetes, HbA1c goal < 7% (H)     Type 2 diabetes mellitus with hyperlipidemia (H)     Benign essential hypertension     Ventral hernia     Past Surgical History:   Procedure Laterality Date     ARTHRODESIS FOOT Right 10/23/2014    Procedure: ARTHRODESIS FOOT;  Surgeon: Mauri Gomez DPM;  Location: [facility] OR     COLONOSCOPY  10/10/2007    Repeat in 5 years     COLONOSCOPY N/A 12/6/2018    Procedure: COLONOSCOPY;  Surgeon: Magno Huber MD;  Location: [facility] GI     LAPAROSCOPIC HERNIORRHAPHY VENTRAL N/A 6/25/2018    Procedure: LAPAROSCOPIC HERNIORRHAPHY VENTRAL;  LAPAROSCOPIC HERNIORRHAPHY VENTRAL ;  Surgeon: Jose Ibrahim MD;  Location:  OR     SURGICAL HISTORY OF -   1996    Hysterectomy     SURGICAL HISTORY OF -   3/2002    Right hammer toe/bunionectomy       Social History     Tobacco Use     Smoking status: Former Smoker     Last attempt  to quit: 1971     Years since quittin.6     Smokeless tobacco: Never Used   Substance Use Topics     Alcohol use: Yes     Comment: social     Family History   Problem Relation Age of Onset     Hypertension Mother      Osteoporosis Mother      Heart Disease Mother 94        small heart attack     Cancer Father         bone cancer     Hypertension Brother      Lipids Sister      Diabetes Sister         diet controlled     Cancer - colorectal Other      Hypertension Brother      Diabetes Brother      Lipids Brother      Cancer - colorectal Brother      Lipids Brother      Hypertension Sister      Asthma No family hx of      C.A.D. No family hx of      Cerebrovascular Disease No family hx of      Breast Cancer No family hx of      Prostate Cancer No family hx of          Current Outpatient Medications   Medication Sig Dispense Refill     aspirin 81 MG tablet Take  by mouth daily.       blood glucose monitoring (ACCU-CHEK JULIO CÉSAR PLUS) test strip 1 strip by In Vitro route 2 times daily 200 strip 0     CALCIUM 500 + D OR 1 tablet twice daily       FLAX SEED OIL OR twice daily       FREESTYLE LANCETS MISC 1 each by Lancet route 2 times daily 100 each prn     losartan (COZAAR) 50 MG tablet TAKE 1 TABLET (50 MG) BY MOUTH DAILY 90 tablet 2     metFORMIN (GLUCOPHAGE-XR) 500 MG 24 hr tablet Take 2 tablets (1,000 mg) by mouth 2 times daily (with meals) 360 tablet 1     pravastatin (PRAVACHOL) 40 MG tablet TAKE ONE TABLET EVERY DAY 90 tablet 3     Allergies   Allergen Reactions     Nkda [No Known Drug Allergies]    Reviewed and updated as needed this visit by Provider         Review of Systems   ROS COMP: CONSTITUTIONAL: NEGATIVE for fever, chills, change in weight  RESP: NEGATIVE for significant cough or SOB  CV: NEGATIVE for chest pain, palpitations or peripheral edema  GI: POSITIVE for itching in the perianal area with blood on TP on occasion.  PSYCHIATRIC: NEGATIVE for changes in mood or affect  ROS otherwise  "negative      Objective    BP (!) 180/92   Pulse 77   Temp 98.6  F (37  C) (Tympanic)   Ht 1.651 m (5' 5\")   Wt 81.9 kg (180 lb 9.6 oz)   SpO2 97%   BMI 30.05 kg/m    Body mass index is 30.05 kg/m .  Physical Exam   GENERAL: healthy, alert and no distress  RESP: lungs clear to auscultation - no rales, rhonchi or wheezes  CV: regular rate and rhythm, normal S1 S2, no S3 or S4, no murmur, click or rub, no peripheral edema and peripheral pulses strong  RECTAL (female): normal sphincter tone, no rectal masses and perianal fissure at 7 o'clock  PSYCH: mentation appears normal, affect normal/bright    Diagnostic Test Results:  Labs reviewed in Epic        Assessment & Plan     1. Perianal fissure  Uncertain of cause but recent colonoscopy in 12/18 rules out crohn's cause.  Most likely secondary fissure though since it is outside the anal sphincter.  Will treat with preparation H (hydrocortisone) cream with barrier creams applied also throughout the day.  Recommended sitz baths and keeping stools normal or soft.  Follow-up in clinic if any persistent or worsening symptoms.    2. Benign essential hypertension  Patient declined repeating BP and states it is due to caffeine.  Recommended that she take BP daily for the next week and if persistently high to follow-up with PCP in clinic.    See Patient Instructions    Return in about 1 week (around 8/6/2019), or if symptoms worsen or fail to improve.    Sasha Barriga NP  [facility]      "

## 2019-07-30 NOTE — PATIENT INSTRUCTIONS
1.  Use Preparation H cream to the external anus and partly up in the anal canal at least twice daily.  2.  Take sitz baths (2-3 inches of warm water) a couple times daily.  3.  Use Vasaline or other barrier cream (A&D ointment over the steroid cream) and use as needed also.  4.  Fiber in diet, fluids, and stool softeners to keep stool on softer side until heals.  5.  If symptoms are not improving, will need to add diltiazem/lidocaine compounding cream.

## 2019-08-09 DIAGNOSIS — E78.5 TYPE 2 DIABETES MELLITUS WITH HYPERLIPIDEMIA (H): ICD-10-CM

## 2019-08-09 DIAGNOSIS — E11.69 TYPE 2 DIABETES MELLITUS WITH HYPERLIPIDEMIA (H): ICD-10-CM

## 2019-08-09 RX ORDER — METFORMIN HCL 500 MG
1000 TABLET, EXTENDED RELEASE 24 HR ORAL 2 TIMES DAILY WITH MEALS
Qty: 120 TABLET | Refills: 1 | Status: SHIPPED | OUTPATIENT
Start: 2019-08-09 | End: 2019-08-22

## 2019-08-09 NOTE — TELEPHONE ENCOUNTER
Routing refill request to provider for review/approval because:  Labs not current:    Apolonia Leyva RN

## 2019-08-09 NOTE — TELEPHONE ENCOUNTER
Requested Prescriptions   Pending Prescriptions Disp Refills     metFORMIN (GLUCOPHAGE-XR) 500 MG 24 hr tablet  Last Written Prescription Date:  10/16/18  Last Fill Quantity: 360,  # refills: 1   Last office visit: 7/30/2019 with prescribing provider:  nicole   Future Office Visit:   Next 5 appointments (look out 90 days)    Aug 22, 2019  8:40 AM CDT  [facility] Elizabeth Claire PA-C  [facility] [address] [facility] [address] [phone]          360 tablet 1     Sig: Take 2 tablets (1,000 mg) by mouth 2 times daily (with meals)       Biguanide Agents Failed - 8/9/2019 11:54 AM        Failed - Blood pressure less than 140/90 in past 6 months     BP Readings from Last 3 Encounters:   07/30/19 (!) 152/86   12/06/18 (!) 119/94   10/16/18 136/84                 Failed - Patient has documented LDL within the past 12 mos.     Recent Labs   Lab Test 07/11/18  0913   LDL 62             Failed - Patient has documented A1c within the specified period of time.     If HgbA1C is 8 or greater, it needs to be on file within the past 3 months.  If less than 8, must be on file within the past 6 months.     Recent Labs   Lab Test 10/16/18  0808   A1C 8.2*             Passed - Patient has had a Microalbumin in the past 15 mos.     Recent Labs   Lab Test 07/11/18  0921   MICROL 432   UMALCR 240.00*             Passed - Patient is age 10 or older        Passed - Patient's CR is NOT>1.4 OR Patient's EGFR is NOT<45 within past 12 mos.     Recent Labs   Lab Test 10/16/18  0808   GFRESTIMATED >90   GFRESTBLACK >90       Recent Labs   Lab Test 10/16/18  0808   CR 0.60             Passed - Patient does NOT have a diagnosis of CHF.        Passed - Medication is active on med list        Passed - Patient is not pregnant        Passed - Patient has not had a positive pregnancy test within the past 12 mos.         Passed - Recent (6 mo) or future (30 days) visit within the authorizing  "provider's specialty     Patient had office visit in the last 6 months or has a visit in the next 30 days with authorizing provider or within the authorizing provider's specialty.  See \"Patient Info\" tab in inbasket, or \"Choose Columns\" in Meds & Orders section of the refill encounter.              "

## 2019-08-18 DIAGNOSIS — E11.9 TYPE 2 DIABETES, HBA1C GOAL < 7% (H): ICD-10-CM

## 2019-08-19 RX ORDER — BLOOD SUGAR DIAGNOSTIC
STRIP MISCELLANEOUS
Qty: 200 STRIP | Refills: 0 | Status: SHIPPED | OUTPATIENT
Start: 2019-08-19 | End: 2019-08-22

## 2019-08-19 NOTE — TELEPHONE ENCOUNTER
"ACCU-CHEK JULIO CÉSAR PLUS  STRIP      Last Written Prescription Date:  9/10/18  Last Fill Quantity: 200,   # refills: 0  Last Office Visit: 19  Future Office visit:    Next 5 appointments (look out 90 days)    Aug 22, 2019  8:40 AM CDT  PHYSICAL with Elizabeth Claire PA-C  [facility] [address] [facility] [address] [phone]           Requested Prescriptions   Pending Prescriptions Disp Refills     ACCU-CHEK JULIO CÉSAR PLUS test strip [Pharmacy Med Name: ACCU-CHEK JULIO CÉSAR PLUS  STRIP] 200 strip 0     Si strip by In Vitro route 2 times daily       Diabetic Supplies Protocol Passed - 2019  7:13 AM        Passed - Medication is active on med list        Passed - Patient is 18 years of age or older        Passed - Recent (6 mo) or future (30 days) visit within the authorizing provider's specialty     Patient had office visit in the last 6 months or has a visit in the next 30 days with authorizing provider.  See \"Patient Info\" tab in inbasket, or \"Choose Columns\" in Meds & Orders section of the refill encounter.              "

## 2019-08-22 ENCOUNTER — TELEPHONE (OUTPATIENT)
Dept: FAMILY MEDICINE | Facility: CLINIC | Age: 74
End: 2019-08-22

## 2019-08-22 ENCOUNTER — OFFICE VISIT (OUTPATIENT)
Dept: FAMILY MEDICINE | Facility: CLINIC | Age: 74
End: 2019-08-22
Payer: MEDICARE

## 2019-08-22 VITALS
TEMPERATURE: 97.8 F | HEIGHT: 65 IN | DIASTOLIC BLOOD PRESSURE: 80 MMHG | HEART RATE: 72 BPM | BODY MASS INDEX: 29.99 KG/M2 | WEIGHT: 180 LBS | SYSTOLIC BLOOD PRESSURE: 152 MMHG

## 2019-08-22 DIAGNOSIS — E11.69 TYPE 2 DIABETES MELLITUS WITH HYPERLIPIDEMIA (H): ICD-10-CM

## 2019-08-22 DIAGNOSIS — Z00.00 ENCOUNTER FOR MEDICARE ANNUAL WELLNESS EXAM: Primary | ICD-10-CM

## 2019-08-22 DIAGNOSIS — E78.5 HYPERLIPIDEMIA LDL GOAL <100: ICD-10-CM

## 2019-08-22 DIAGNOSIS — I10 BENIGN ESSENTIAL HYPERTENSION: ICD-10-CM

## 2019-08-22 DIAGNOSIS — E78.5 TYPE 2 DIABETES MELLITUS WITH HYPERLIPIDEMIA (H): ICD-10-CM

## 2019-08-22 DIAGNOSIS — E11.9 TYPE 2 DIABETES, HBA1C GOAL < 7% (H): ICD-10-CM

## 2019-08-22 DIAGNOSIS — E11.9 TYPE 2 DIABETES, HBA1C GOAL < 7% (H): Primary | ICD-10-CM

## 2019-08-22 DIAGNOSIS — H93.11 TINNITUS, RIGHT: ICD-10-CM

## 2019-08-22 LAB
ANION GAP SERPL CALCULATED.3IONS-SCNC: 6 MMOL/L (ref 3–14)
BUN SERPL-MCNC: 11 MG/DL (ref 7–30)
CALCIUM SERPL-MCNC: 9.1 MG/DL (ref 8.5–10.1)
CHLORIDE SERPL-SCNC: 105 MMOL/L (ref 94–109)
CHOLEST SERPL-MCNC: 177 MG/DL
CO2 SERPL-SCNC: 24 MMOL/L (ref 20–32)
CREAT SERPL-MCNC: 0.47 MG/DL (ref 0.52–1.04)
CREAT UR-MCNC: 96 MG/DL
GFR SERPL CREATININE-BSD FRML MDRD: >90 ML/MIN/{1.73_M2}
GLUCOSE SERPL-MCNC: 181 MG/DL (ref 70–99)
HBA1C MFR BLD: 8.1 % (ref 0–5.6)
HDLC SERPL-MCNC: 77 MG/DL
LDLC SERPL CALC-MCNC: 74 MG/DL
MICROALBUMIN UR-MCNC: 8 MG/L
MICROALBUMIN/CREAT UR: 8.21 MG/G CR (ref 0–25)
NONHDLC SERPL-MCNC: 100 MG/DL
POTASSIUM SERPL-SCNC: 4.8 MMOL/L (ref 3.4–5.3)
SODIUM SERPL-SCNC: 135 MMOL/L (ref 133–144)
TRIGL SERPL-MCNC: 130 MG/DL

## 2019-08-22 PROCEDURE — G0439 PPPS, SUBSEQ VISIT: HCPCS | Performed by: PHYSICIAN ASSISTANT

## 2019-08-22 PROCEDURE — 80061 LIPID PANEL: CPT | Performed by: PHYSICIAN ASSISTANT

## 2019-08-22 PROCEDURE — 99207 C FOOT EXAM  NO CHARGE: CPT | Performed by: PHYSICIAN ASSISTANT

## 2019-08-22 PROCEDURE — 82043 UR ALBUMIN QUANTITATIVE: CPT | Performed by: PHYSICIAN ASSISTANT

## 2019-08-22 PROCEDURE — 80048 BASIC METABOLIC PNL TOTAL CA: CPT | Performed by: PHYSICIAN ASSISTANT

## 2019-08-22 PROCEDURE — 83036 HEMOGLOBIN GLYCOSYLATED A1C: CPT | Performed by: PHYSICIAN ASSISTANT

## 2019-08-22 PROCEDURE — 99214 OFFICE O/P EST MOD 30 MIN: CPT | Mod: 25 | Performed by: PHYSICIAN ASSISTANT

## 2019-08-22 PROCEDURE — 36415 COLL VENOUS BLD VENIPUNCTURE: CPT | Performed by: PHYSICIAN ASSISTANT

## 2019-08-22 RX ORDER — PRAVASTATIN SODIUM 40 MG
40 TABLET ORAL DAILY
Qty: 90 TABLET | Refills: 3 | Status: SHIPPED | OUTPATIENT
Start: 2019-08-22 | End: 2020-09-21

## 2019-08-22 RX ORDER — LOSARTAN POTASSIUM 50 MG/1
75 TABLET ORAL DAILY
Qty: 180 TABLET | Refills: 2 | Status: SHIPPED | OUTPATIENT
Start: 2019-08-22 | End: 2020-08-26

## 2019-08-22 RX ORDER — METFORMIN HCL 500 MG
1000 TABLET, EXTENDED RELEASE 24 HR ORAL 2 TIMES DAILY WITH MEALS
Qty: 370 TABLET | Refills: 3 | Status: SHIPPED | OUTPATIENT
Start: 2019-08-22 | End: 2020-09-21

## 2019-08-22 ASSESSMENT — MIFFLIN-ST. JEOR: SCORE: 1323.38

## 2019-08-22 NOTE — PROGRESS NOTES
"  SUBJECTIVE:   Mackenzie Trinidad is a 74 year old female who presents for Preventive Visit.  Are you in the first 12 months of your Medicare Part B coverage?  No    Physical Health:    In general, how would you rate your overall physical health? good    Outside of work, how many days during the week do you exercise? 4-5 days/week    Outside of work, approximately how many minutes a day do you exercise?30-45 minutes    If you drink alcohol do you typically have >3 drinks per day or >7 drinks per week? No    Do you usually eat at least 4 servings of fruit and vegetables a day, include whole grains & fiber and avoid regularly eating high fat or \"junk\" foods? Yes    Do you have any problems taking medications regularly?  No    Do you have any side effects from medications? none    Needs assistance for the following daily activities: no assistance needed    Which of the following safety concerns are present in your home?  none identified     Hearing impairment: No    In the past 6 months, have you been bothered by leaking of urine? no    Mental Health:    In general, how would you rate your overall mental or emotional health? excellent  PHQ-2 Score:     PHQ-2 (  [employer]) 2019 10/16/2018   Q1: Little interest or pleasure in doing things 0 0   Q2: Feeling down, depressed or hopeless 0 0   PHQ-2 Score 0 0         Do you feel safe in your environment? Yes    Do you have a Health Care Directive? Yes: Advance Directive has been received and scanned.  - cannot find in chart    Additional concerns to address?  No    Fall risk:  Fallen 2 or more times in the past year?: No  Any fall with injury in the past year?: No      Cognitive Screenin) Repeat 3 items (Leader, Season, Table)    2) Clock draw: NORMAL  3) 3 item recall: Recalls 3 objects  Results: NORMAL clock, 1-2 items recalled: COGNITIVE IMPAIRMENT LESS LIKELY    Mini-CogTM Copyright JOSE Cerda. Licensed by the author for use in [facility]; " reprinted with permission ([email or web address]). All rights reserved.      Do you have sleep apnea, excessive snoring or daytime drowsiness?: no    ** sees advanced dermatology for skin checks - most recently January 2019    Diabetes Follow-up      How often are you checking your blood sugar? One time daily, 140-170 range    What time of day are you checking your blood sugars (select all that apply)?  Before meals    Have you had any blood sugars above 200?  No    Have you had any blood sugars below 70?  No    What symptoms do you notice when your blood sugar is low?  None    What concerns do you have today about your diabetes? None     Do you have any of these symptoms? (Select all that apply)  No numbness or tingling in feet.  No redness, sores or blisters on feet.  No complaints of excessive thirst.  No reports of blurry vision.  No significant changes to weight.     Have you had a diabetic eye exam in the last 12 months? Yes- Date of last eye exam: about 6 months ago, [facility]    ALEX signed    Diabetes Management Resources    Hyperlipidemia Follow-Up    Are you having any of the following symptoms? (Select all that apply)  No complaints of shortness of breath, chest pain or pressure.  No increased sweating or nausea with activity.  No left-sided neck or arm pain.  No complaints of pain in calves when walking 1-2 blocks.    Are you regularly taking any medication or supplement to lower your cholesterol?   Yes- Pravastatin    Are you having muscle aches or other side effects that you think could be caused by your cholesterol lowering medication?  No    Hypertension Follow-up      Do you check your blood pressure regularly outside of the clinic? Yes     Are you following a low salt diet? Yes    Are your blood pressures ever more than 140 on the top number (systolic) OR more   than 90 on the bottom number (diastolic), for example 140/90? Yes  Home readings 147/75 P63 and 136/81 P64  She brings in blood pressure  card, runs from 120s/60s - 130s/70s - 160s/80s    BP Readings from Last 2 Encounters:   19 (!) 152/80   19 (!) 152/86     Hemoglobin A1C (%)   Date Value   2019 8.1 (H)   10/16/2018 8.2 (H)     LDL Cholesterol Calculated (mg/dL)   Date Value   2019 74   2018 62       Reviewed and updated as needed this visit by clinical staff  Tobacco  Allergies  Meds  Problems  Med Hx  Surg Hx  Fam Hx  Soc Hx          Reviewed and updated as needed this visit by Provider  Tobacco  Allergies  Meds  Problems  Med Hx  Surg Hx  Fam Hx  Soc Hx         Social History     Tobacco Use     Smoking status: Former Smoker     Last attempt to quit: 1971     Years since quittin.6     Smokeless tobacco: Never Used   Substance Use Topics     Alcohol use: Yes     Comment: social                           Current providers sharing in care for this patient include:   Patient Care Team:  Dorota Art MD as PCP - General (Family Practice)  Dorota Art MD as Assigned PCP    The following health maintenance items are reviewed in Epic and correct as of today:  Health Maintenance   Topic Date Due     ZOSTER IMMUNIZATION (1 of 2) 1997     EYE EXAM  2019     INFLUENZA VACCINE (1) 2019     A1C  2020     MAMMO SCREENING  2020     TSH W/FREE T4 REFLEX  2020     MEDICARE ANNUAL WELLNESS VISIT  2020     BMP  2020     LIPID  2020     MICROALBUMIN  2020     DIABETIC FOOT EXAM  2020     FALL RISK ASSESSMENT  2020     DEXA  2022     DTAP/TDAP/TD IMMUNIZATION (4 - Td) 2022     ADVANCE CARE PLANNING  10/16/2023     COLONOSCOPY  2023     HEPATITIS C SCREENING  Completed     PHQ-2  Completed     PNEUMOCOCCAL IMMUNIZATION 65+ LOW/MEDIUM RISK  Completed     IPV IMMUNIZATION  Aged Out     MENINGITIS IMMUNIZATION  Aged Out     Labs reviewed in EPIC  BP Readings from Last 3 Encounters:   19 (!) 152/80   19 (!)  152/86   18 (!) 119/94    Wt Readings from Last 3 Encounters:   19 81.6 kg (180 lb)   19 81.9 kg (180 lb 9.6 oz)   18 81.2 kg (179 lb)                  Patient Active Problem List   Diagnosis     FAMILY HISTORY OF OSTEOPORSIS     FAMILY HISTORY OF COLON CANCER     MYOPIA     PRESBYOPIA     Diverticulosis of large intestine     Hyperlipidemia LDL goal <100     Advanced directives, counseling/discussion     Type 2 diabetes, HbA1c goal < 7% (H)     Type 2 diabetes mellitus with hyperlipidemia (H)     Benign essential hypertension     Ventral hernia     Past Surgical History:   Procedure Laterality Date     ARTHRODESIS FOOT Right 10/23/2014    Procedure: ARTHRODESIS FOOT;  Surgeon: Mauri Gmoez DPM;  Location: [facility]     COLONOSCOPY  10/10/2007    Repeat in 5 years     COLONOSCOPY N/A 2018    Procedure: COLONOSCOPY;  Surgeon: Magno Huber MD;  Location: [facility]     LAPAROSCOPIC HERNIORRHAPHY VENTRAL N/A 2018    Procedure: LAPAROSCOPIC HERNIORRHAPHY VENTRAL;  LAPAROSCOPIC HERNIORRHAPHY VENTRAL ;  Surgeon: Jose Ibrahim MD;  Location:  OR     SURGICAL HISTORY OF -       Hysterectomy     SURGICAL HISTORY OF -   3/2002    Right hammer toe/bunionectomy       Social History     Tobacco Use     Smoking status: Former Smoker     Last attempt to quit: 1971     Years since quittin.6     Smokeless tobacco: Never Used   Substance Use Topics     Alcohol use: Yes     Comment: social     Family History   Problem Relation Age of Onset     Hypertension Mother      Osteoporosis Mother      Heart Disease Mother 94        small heart attack     Cancer Father         bone cancer     Hypertension Brother      Lipids Sister      Diabetes Sister         diet controlled     Cancer - colorectal Other 45        he  at age 45 from colon cancer     Hypertension Brother      Diabetes Brother      Lipids Brother      Cancer - colorectal Brother         in his 60s. tested negative  "genetic markers     Lipids Brother      Hypertension Sister      Asthma No family hx of      C.A.D. No family hx of      Cerebrovascular Disease No family hx of      Breast Cancer No family hx of      Prostate Cancer No family hx of          Mammogram Screening: Mammogram Screening: Patient over age 50, mutual decision to screen reflected in health maintenance.  History of abnormal Pap smear: NO - age 65 - see link Cervical Cytology Screening Guidelines  Last 3 Pap and HPV Results:   PAP / HPV 4/20/2005   PAP NIL       ROS:  CONSTITUTIONAL: NEGATIVE for fever, chills, change in weight  INTEGUMENTARY/SKIN: NEGATIVE for worrisome rashes, moles or lesions  EYES: NEGATIVE for vision changes or irritation  ENT/MOUTH: NEGATIVE for ear, mouth and throat problems  RESP: NEGATIVE for significant cough or SOB  BREAST: NEGATIVE for masses, tenderness or discharge  CV: NEGATIVE for chest pain, palpitations or peripheral edema  GI: NEGATIVE for nausea, abdominal pain, heartburn, or change in bowel habits  : NEGATIVE for frequency, dysuria, or hematuria  MUSCULOSKELETAL: NEGATIVE for significant arthralgias or myalgia  NEURO: NEGATIVE for weakness, dizziness or paresthesias  ENDOCRINE: NEGATIVE for temperature intolerance, skin/hair changes  HEME: NEGATIVE for bleeding problems  PSYCHIATRIC: NEGATIVE for changes in mood or affect    OBJECTIVE:   BP (!) 152/80   Pulse 72   Temp 97.8  F (36.6  C) (Tympanic)   Ht 1.645 m (5' 4.75\")   Wt 81.6 kg (180 lb)   BMI 30.19 kg/m   Estimated body mass index is 30.19 kg/m  as calculated from the following:    Height as of this encounter: 1.645 m (5' 4.75\").    Weight as of this encounter: 81.6 kg (180 lb).  EXAM:   GENERAL: healthy, alert and no distress  EYES: Eyes grossly normal to inspection, PERRL and conjunctivae and sclerae normal  HENT: ear canals and TM's normal, nose and mouth without ulcers or lesions  NECK: no adenopathy, no asymmetry, masses, or scars and thyroid normal to " palpation  RESP: lungs clear to auscultation - no rales, rhonchi or wheezes  CV: regular rate and rhythm, normal S1 S2, no S3 or S4, no murmur, click or rub, no peripheral edema and peripheral pulses strong  ABDOMEN: soft, nontender, no hepatosplenomegaly, no masses and bowel sounds normal  MS: no gross musculoskeletal defects noted, no edema  SKIN: no suspicious lesions or rashes  SKIN: POSITIVE tiny erythematous non-blanchable macules scattered legs/feet - patient notes this occurs in the summer for her typically and resolves in winter  NEURO: Normal strength and tone, mentation intact and speech normal  BACK: no CVA tenderness, no paralumbar tenderness  PSYCH: mentation appears normal, affect normal/bright  LYMPH: no cervical, supraclavicular, axillary, or inguinal adenopathy  Deferred /breast exams  DIABETIC FOOT EXAM: normal DP and PT pulses, no trophic changes or ulcerative lesions and normal sensory exam     Diagnostic Test Results:  Labs reviewed in Epic    ASSESSMENT / PLAN:     ASSESSMENT/PLAN:      ICD-10-CM    1. Encounter for Medicare annual wellness exam Z00.00    2. Type 2 diabetes, HbA1c goal < 7% (H) E11.9 HEMOGLOBIN A1C     Lipid panel reflex to direct LDL Fasting     Albumin Random Urine Quantitative with Creat Ratio     Basic metabolic panel     FOOT EXAM     blood glucose (ACCU-CHEK JULIO CÉSAR PLUS) test strip     **A1C FUTURE 1yr   3. Type 2 diabetes mellitus with hyperlipidemia (H) E11.69 metFORMIN (GLUCOPHAGE-XR) 500 MG 24 hr tablet    E78.5    4. Tinnitus, right H93.11 losartan (COZAAR) 50 MG tablet   5. Hyperlipidemia LDL goal <100 E78.5 pravastatin (PRAVACHOL) 40 MG tablet   6. Benign essential hypertension I10      She will discuss skin changes with her dermatologist    Will obtain HbA1C. We discussed options: diabetic Ed vs diet changes vs add on new oral medication. She is not interested in injectable medications. She will think about it.  Recommended recheck A1C in 3 months    Patient  "Instructions     Increase blood pressure medication: increase losartan to 75 mg (1.5 tablets) daily  Recheck blood pressure in 2 weeks    If you are interested in shingles shot, check with insurance - if covered at pharmacy or clinic    End of Life Planning:  Patient currently has an advanced directive: Yes. We do not have on file. She will bring back in.    COUNSELING:  Reviewed preventive health counseling, as reflected in patient instructions       Regular exercise       Healthy diet/nutrition       Advanced Planning     Estimated body mass index is 30.19 kg/m  as calculated from the following:    Height as of this encounter: 1.645 m (5' 4.75\").    Weight as of this encounter: 81.6 kg (180 lb).    Weight management plan: Discussed healthy diet and exercise guidelines     reports that she quit smoking about 48 years ago. She has never used smokeless tobacco.    Appropriate preventive services were discussed with this patient, including applicable screening as appropriate for cardiovascular disease, diabetes, osteopenia/osteoporosis, and glaucoma.  As appropriate for age/gender, discussed screening for colorectal cancer, prostate cancer, breast cancer, and cervical cancer. Checklist reviewing preventive services available has been given to the patient.    Reviewed patients plan of care and provided an AVS. The Basic Care Plan (routine screening as documented in Health Maintenance) for Mackenzie meets the Care Plan requirement. This Care Plan has been established and reviewed with the Patient.    Counseling Resources:  ATP IV Guidelines  Pooled Cohorts Equation Calculator  Breast Cancer Risk Calculator  FRAX Risk Assessment  ICSI Preventive Guidelines  Dietary Guidelines for Americans, 2010  USDA's MyPlate  ASA Prophylaxis  Lung CA Screening    Elizabeth Claire PA-C  [facility]"

## 2019-08-22 NOTE — PATIENT INSTRUCTIONS
Increase blood pressure medication: increase losartan to 75 mg (1.5 tablets) daily  Recheck blood pressure in 2 weeks    If you are interested in shingles shot, check with insurance - if covered at pharmacy or clinic      Patient Education   Personalized Prevention Plan  You are due for the preventive services outlined below.  Your care team is available to assist you in scheduling these services.  If you have already completed any of these items, please share that information with your care team to update in your medical record.  Health Maintenance Due   Topic Date Due     Zoster (Shingles) Vaccine (1 of 2) 01/04/1997     Annual Wellness Visit  01/06/2013     PHQ-2  01/01/2019     A1C Lab  04/16/2019     Cholesterol Lab  07/11/2019     Kidney Microalbumin Urine Test  07/11/2019     Diabetic Foot Exam  07/11/2019     FALL RISK ASSESSMENT  07/11/2019     Eye Exam  08/20/2019

## 2019-08-22 NOTE — TELEPHONE ENCOUNTER
Lab results:  Albumin, kidney function , electrolytes, cholesterol looks normal/stable.  Glucose is elevated.  HbA1C is 8.1. Last year was 8.2.  Still elevated, we discussed A1C goals and treatment plan in clinic today.  Options:   - can work more concerted effort on low carb/sweets diet.  - diabetic educator to give more help with this  - add on a new oral medication    Let me know what she thinks    Would recommend that she check her blood sugars at random times (fasting, before meal, 2 hours after meal) and start writing them down.  mychart me in 2-4 weeks with blood sugar readings, recheck A1C in 3 months    Elizabeth Claire PA-C

## 2019-08-23 NOTE — TELEPHONE ENCOUNTER
Call placed to patient.  Relayed R Dakotah message.    Patient prefers to work on low carb, low sweets in diet.  Patient agrees to see diabetic ed.  Order placed for referral, contact number given to patient for scheduling appointment.    Patient verbalizes understanding of follow up plan, labs and update via my chart BG values.  Apolonia Leyva RN

## 2019-10-01 ENCOUNTER — OFFICE VISIT (OUTPATIENT)
Dept: FAMILY MEDICINE | Facility: CLINIC | Age: 74
End: 2019-10-01
Payer: MEDICARE

## 2019-10-01 VITALS
SYSTOLIC BLOOD PRESSURE: 139 MMHG | BODY MASS INDEX: 29.82 KG/M2 | WEIGHT: 179 LBS | HEART RATE: 75 BPM | TEMPERATURE: 98.5 F | HEIGHT: 65 IN | DIASTOLIC BLOOD PRESSURE: 86 MMHG | OXYGEN SATURATION: 99 %

## 2019-10-01 DIAGNOSIS — I10 ESSENTIAL HYPERTENSION: ICD-10-CM

## 2019-10-01 DIAGNOSIS — E78.5 TYPE 2 DIABETES MELLITUS WITH HYPERLIPIDEMIA (H): ICD-10-CM

## 2019-10-01 DIAGNOSIS — Z01.818 PREOP GENERAL PHYSICAL EXAM: Primary | ICD-10-CM

## 2019-10-01 DIAGNOSIS — E11.69 TYPE 2 DIABETES MELLITUS WITH HYPERLIPIDEMIA (H): ICD-10-CM

## 2019-10-01 DIAGNOSIS — Q74.2 ANOMALY OF TOE: ICD-10-CM

## 2019-10-01 LAB
ANION GAP SERPL CALCULATED.3IONS-SCNC: 6 MMOL/L (ref 3–14)
BUN SERPL-MCNC: 15 MG/DL (ref 7–30)
CALCIUM SERPL-MCNC: 9.8 MG/DL (ref 8.5–10.1)
CHLORIDE SERPL-SCNC: 101 MMOL/L (ref 94–109)
CO2 SERPL-SCNC: 27 MMOL/L (ref 20–32)
CREAT SERPL-MCNC: 0.65 MG/DL (ref 0.52–1.04)
GFR SERPL CREATININE-BSD FRML MDRD: 88 ML/MIN/{1.73_M2}
GLUCOSE SERPL-MCNC: 151 MG/DL (ref 70–99)
POTASSIUM SERPL-SCNC: 4 MMOL/L (ref 3.4–5.3)
SODIUM SERPL-SCNC: 134 MMOL/L (ref 133–144)

## 2019-10-01 PROCEDURE — 84132 ASSAY OF SERUM POTASSIUM: CPT | Performed by: FAMILY MEDICINE

## 2019-10-01 PROCEDURE — 93000 ELECTROCARDIOGRAM COMPLETE: CPT | Performed by: FAMILY MEDICINE

## 2019-10-01 PROCEDURE — 36415 COLL VENOUS BLD VENIPUNCTURE: CPT | Performed by: FAMILY MEDICINE

## 2019-10-01 PROCEDURE — 80048 BASIC METABOLIC PNL TOTAL CA: CPT | Performed by: FAMILY MEDICINE

## 2019-10-01 PROCEDURE — 99215 OFFICE O/P EST HI 40 MIN: CPT | Performed by: FAMILY MEDICINE

## 2019-10-01 ASSESSMENT — MIFFLIN-ST. JEOR: SCORE: 1318.85

## 2019-10-01 NOTE — H&P (VIEW-ONLY)
[facility]  [address] [facility] [address]  [phone]  Dept: [phone]    PRE-OP EVALUATION:  Today's date: 10/1/2019    Mackenzie Trinidad (: 1945) presents for pre-operative evaluation assessment as requested by Mauri Yan, DPM.  She requires evaluation and anesthesia risk assessment prior to undergoing surgery/procedure for treatment of Right toes .    Proposed Surgery/ Procedure: 2nd metatarsophalangeal stabilization laterally, 3rd toe correction, proximal interphalangeal joint fusion and 4th toe flexor release.  Date of Surgery/ Procedure: 10/3/19  Time of Surgery/ Procedure: 2:30pm  Hospital/Surgical Facility: [facility]   Primary Physician: Dorota Art  Type of Anesthesia Anticipated: Combined MAC with Local    Patient has a Health Care Directive or Living Will:  YES     1. NO - Do you have a history of heart attack, stroke, stent, bypass or surgery on an artery in the head, neck, heart or legs?  2. NO - Do you ever have any pain or discomfort in your chest?  3. NO - Do you have a history of  Heart Failure?  4. NO - Are you troubled by shortness of breath when: walking on the level, up a slight hill or at night?  5. NO - Do you currently have a cold, bronchitis or other respiratory infection?  6. NO - Do you have a cough, shortness of breath or wheezing?  7. NO - Do you sometimes get pains in the calves of your legs when you walk?  8. NO - Do you or anyone in your family have previous history of blood clots?  9. NO - Do you or does anyone in your family have a serious bleeding problem such as prolonged bleeding following surgeries or cuts?  10. NO - Have you ever had problems with anemia or been told to take iron pills?  11. NO - Have you had any abnormal blood loss such as black, tarry or bloody stools, or abnormal vaginal bleeding?  12. NO - Have you ever had a blood transfusion?  13. YES - HAVE YOU OR ANY OF YOUR RELATIVES EVER HAD PROBLEMS WITH  ANESTHESIA? Personally with vomiting hopefully will be less with the MAC  14. NO - Do you have sleep apnea, excessive snoring or daytime drowsiness?  15. NO - Do you have any prosthetic heart valves?  16. NO - Do you have prosthetic joints?  17. NO - Is there any chance that you may be pregnant?      HPI:     HPI related to upcoming procedure: pain in the right foot needs surgery       See problem list for active medical problems.  Problems all longstanding and stable, except as noted/documented.  See ROS for pertinent symptoms related to these conditions.      MEDICAL HISTORY:     Patient Active Problem List    Diagnosis Date Noted     Ventral hernia 06/23/2018     Priority: Medium     Benign essential hypertension 11/28/2016     Priority: Medium     Type 2 diabetes mellitus with hyperlipidemia (H) 10/25/2015     Priority: Medium     Advanced directives, counseling/discussion 02/02/2012     Priority: Medium     Advance Directive Problem List Overview:   Name Relationship Phone    Primary Health Care Agent            Alternative Health Care Agent          Discussed advance care planning with patient; information given to patient to review.    Matilda Russell CMA, HC Facilitator    2/2/2012            Hyperlipidemia LDL goal <100 01/23/2012     Priority: Medium     Type 2 diabetes, HbA1c goal < 7% (H) 01/23/2012     Priority: Medium     Diverticulosis of large intestine 10/10/2007     Priority: Medium     Problem list name updated by automated process. Provider to review       FAMILY HISTORY OF OSTEOPORSIS 04/20/2005     Priority: Medium     Mother taking meds  Problem list name updated by automated process. 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     Priority: Medium       Past Medical History:   Diagnosis Date     Need for prophylactic hormone replacement therapy (postmenopausal)      PONV (postoperative nausea and vomiting)      Past Surgical History:   Procedure Laterality Date     ARTHRODESIS FOOT Right 10/23/2014    Procedure: ARTHRODESIS FOOT;  Surgeon: Mauri Gomez DPM;  Location: [facility]     COLONOSCOPY  10/10/2007    Repeat in 5 years     COLONOSCOPY N/A 2018    Procedure: COLONOSCOPY;  Surgeon: Magno Huber MD;  Location: [facility]     LAPAROSCOPIC HERNIORRHAPHY VENTRAL N/A 2018    Procedure: LAPAROSCOPIC HERNIORRHAPHY VENTRAL;  LAPAROSCOPIC HERNIORRHAPHY VENTRAL ;  Surgeon: Jose Ibrahim MD;  Location:  OR     SURGICAL HISTORY OF -       Hysterectomy     SURGICAL HISTORY OF -   3/2002    Right hammer toe/bunionectomy     Current Outpatient Medications   Medication Sig Dispense Refill     blood glucose (ACCU-CHEK JULIO CÉSAR PLUS) test strip 1 strip by In Vitro route 2 times daily 200 strip 3     CALCIUM 500 + D OR 1 tablet twice daily       FLAX SEED OIL OR twice daily       FREESTYLE LANCETS MISC 1 each by Lancet route 2 times daily 100 each prn     losartan (COZAAR) 50 MG tablet Take 1.5 tablets (75 mg) by mouth daily 180 tablet 2     metFORMIN (GLUCOPHAGE-XR) 500 MG 24 hr tablet Take 2 tablets (1,000 mg) by mouth 2 times daily (with meals) 370 tablet 3     pravastatin (PRAVACHOL) 40 MG tablet Take 1 tablet (40 mg) by mouth daily 90 tablet 3     aspirin 81 MG tablet Take  by mouth daily.       OTC products: None, except as noted above quit taking the asa last week     Allergies   Allergen Reactions     Nkda [No Known Drug Allergies]       Latex Allergy: NO    Social History     Tobacco Use     Smoking status: Former Smoker     Last attempt to quit: 1971     Years since quittin.7     Smokeless tobacco: Never Used   Substance Use Topics     Alcohol use: Yes     Comment: social     History   Drug Use No       REVIEW OF SYSTEMS:  "  Constitutional, neuro, ENT, endocrine, pulmonary, cardiac, gastrointestinal, genitourinary, musculoskeletal, integument and psychiatric systems are negative, except as otherwise noted.    EXAM:   /86   Pulse 75   Temp 98.5  F (36.9  C) (Tympanic)   Ht 1.645 m (5' 4.75\")   Wt 81.2 kg (179 lb)   SpO2 99%   BMI 30.02 kg/m      GENERAL APPEARANCE: healthy, alert and no distress     EYES: EOMI, PERRL     HENT: ear canals and TM's normal and nose and mouth without ulcers or lesions     NECK: no adenopathy, no asymmetry, masses, or scars and thyroid normal to palpation     RESP: lungs clear to auscultation - no rales, rhonchi or wheezes     CV: regular rates and rhythm, normal S1 S2, no S3 or S4 and no murmur, click or rub     ABDOMEN:  soft, nontender, no HSM or masses and bowel sounds normal     MS: extremities normal- no gross deformities noted and hammer toe - mallet toe deformity right      SKIN: no suspicious lesions or rashes     NEURO: Normal strength and tone, sensory exam grossly normal, mentation intact and speech normal     PSYCH: mentation appears normal. and affect normal/bright     LYMPHATICS: No cervical adenopathy    DIAGNOSTICS:     EKG: appears normal, NSR, normal axis, normal intervals, no acute ST/T changes c/w ischemia, no LVH by voltage criteria, unchanged from previous tracings  Labs Drawn and in Process:   Unresulted Labs Ordered in the Past 30 Days of this Admission     No orders found from 9/1/2019 to 10/2/2019.      awaiting labs     Recent Labs   Lab Test 08/22/19  0943 10/16/18  0808  06/24/18  0730 06/23/18  1827   HGB  --   --   --  12.0 13.4   PLT  --   --   --  208 220   INR  --   --   --   --  0.95    136  --  139  --    POTASSIUM 4.8 4.1  --  3.6  --    CR 0.47* 0.60  --  0.52  --    A1C 8.1* 8.2*   < >  --   --     < > = values in this interval not displayed.      Results for orders placed or performed in visit on 10/01/19   Basic metabolic panel   Result Value Ref " Range    Sodium 134 133 - 144 mmol/L    Potassium 4.0 3.4 - 5.3 mmol/L    Chloride 101 94 - 109 mmol/L    Carbon Dioxide 27 20 - 32 mmol/L    Anion Gap 6 3 - 14 mmol/L    Glucose 151 (H) 70 - 99 mg/dL    Urea Nitrogen 15 7 - 30 mg/dL    Creatinine 0.65 0.52 - 1.04 mg/dL    GFR Estimate 88 >60 mL/min/[1.73_m2]    GFR Estimate If Black >90 >60 mL/min/[1.73_m2]    Calcium 9.8 8.5 - 10.1 mg/dL         IMPRESSION:   Reason for surgery/procedure: toe contractures multiple    The proposed surgical procedure is considered INTERMEDIATE risk.    REVISED CARDIAC RISK INDEX  The patient has the following serious cardiovascular risks for perioperative complications such as (MI, PE, VFib and 3  AV Block):  No serious cardiac risks  INTERPRETATION: 0 risks: Class I (very low risk - 0.4% complication rate)    The patient has the following additional risks for perioperative complications:  No identified additional risks      ICD-10-CM    1. Preop general physical exam Z01.818 EKG 12-lead complete w/read - Clinics     Potassium     Glucose   2. 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 Type 2 diabetes mellitus with hyperlipidemia (H) E11.69 Glucose    E78.5        RECOMMENDATIONS:         --Patient is to take all scheduled medications on the day of surgery EXCEPT for modifications listed below.    Diabetes Medication Use    -----Hold usual oral and non-insulin diabetic meds (e.g. Metformin, Actos, Glipizide) while NPO.       ACE Inhibitor or Angiotensin Receptor Blocker (ARB) Use  Ace inhibitor or Angiotensin Receptor Blocker (ARB) and should HOLD this medication for the 24 hours prior to surgery.      Pending review of diagnostic evaluation, APPROVAL GIVEN to proceed with proposed procedure, without further diagnostic evaluation.       Signed Electronically by: Dorota Art MD    Copy of this evaluation report is provided to requesting physician.    [facility] Preop Guidelines    Revised Cardiac Risk Index

## 2019-10-01 NOTE — PROGRESS NOTES
[facility]  [address] [address] [address]  [phone]  Dept: [phone]    PRE-OP EVALUATION:  Today's date: 10/1/2019    Mackenzie Trinidad (: 1945) presents for pre-operative evaluation assessment as requested by Mauri Yan, DPM.  She requires evaluation and anesthesia risk assessment prior to undergoing surgery/procedure for treatment of Right toes .    Proposed Surgery/ Procedure: 2nd metatarsophalangeal stabilization laterally, 3rd toe correction, proximal interphalangeal joint fusion and 4th toe flexor release.  Date of Surgery/ Procedure: 10/3/19  Time of Surgery/ Procedure: 2:30pm  Hospital/Surgical Facility: [facility]   Primary Physician: Dorota Art  Type of Anesthesia Anticipated: Combined MAC with Local    Patient has a Health Care Directive or Living Will:  YES     1. NO - Do you have a history of heart attack, stroke, stent, bypass or surgery on an artery in the head, neck, heart or legs?  2. NO - Do you ever have any pain or discomfort in your chest?  3. NO - Do you have a history of  Heart Failure?  4. NO - Are you troubled by shortness of breath when: walking on the level, up a slight hill or at night?  5. NO - Do you currently have a cold, bronchitis or other respiratory infection?  6. NO - Do you have a cough, shortness of breath or wheezing?  7. NO - Do you sometimes get pains in the calves of your legs when you walk?  8. NO - Do you or anyone in your family have previous history of blood clots?  9. NO - Do you or does anyone in your family have a serious bleeding problem such as prolonged bleeding following surgeries or cuts?  10. NO - Have you ever had problems with anemia or been told to take iron pills?  11. NO - Have you had any abnormal blood loss such as black, tarry or bloody stools, or abnormal vaginal bleeding?  12. NO - Have you ever had a blood transfusion?  13. YES - HAVE YOU OR ANY OF YOUR RELATIVES EVER HAD PROBLEMS WITH  ANESTHESIA? Personally with vomiting hopefully will be less with the MAC  14. NO - Do you have sleep apnea, excessive snoring or daytime drowsiness?  15. NO - Do you have any prosthetic heart valves?  16. NO - Do you have prosthetic joints?  17. NO - Is there any chance that you may be pregnant?      HPI:     HPI related to upcoming procedure: pain in the right foot needs surgery       See problem list for active medical problems.  Problems all longstanding and stable, except as noted/documented.  See ROS for pertinent symptoms related to these conditions.      MEDICAL HISTORY:     Patient Active Problem List    Diagnosis Date Noted     Ventral hernia 06/23/2018     Priority: Medium     Benign essential hypertension 11/28/2016     Priority: Medium     Type 2 diabetes mellitus with hyperlipidemia (H) 10/25/2015     Priority: Medium     Advanced directives, counseling/discussion 02/02/2012     Priority: Medium     Advance Directive Problem List Overview:   Name Relationship Phone    Primary Health Care Agent            Alternative Health Care Agent          Discussed advance care planning with patient; information given to patient to review.    Matilda Russell CMA, HC Facilitator    2/2/2012            Hyperlipidemia LDL goal <100 01/23/2012     Priority: Medium     Type 2 diabetes, HbA1c goal < 7% (H) 01/23/2012     Priority: Medium     Diverticulosis of large intestine 10/10/2007     Priority: Medium     Problem list name updated by automated process. Provider to review       FAMILY HISTORY OF OSTEOPORSIS 04/20/2005     Priority: Medium     Mother taking meds  Problem list name updated by automated process. 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     Priority: Medium       Past Medical History:   Diagnosis Date     Need for prophylactic hormone replacement therapy (postmenopausal)      PONV (postoperative nausea and vomiting)      Past Surgical History:   Procedure Laterality Date     ARTHRODESIS FOOT Right 10/23/2014    Procedure: ARTHRODESIS FOOT;  Surgeon: Mauri Gomez DPM;  Location: [facility]     COLONOSCOPY  10/10/2007    Repeat in 5 years     COLONOSCOPY N/A 2018    Procedure: COLONOSCOPY;  Surgeon: Magno Huber MD;  Location: [facility]     LAPAROSCOPIC HERNIORRHAPHY VENTRAL N/A 2018    Procedure: LAPAROSCOPIC HERNIORRHAPHY VENTRAL;  LAPAROSCOPIC HERNIORRHAPHY VENTRAL ;  Surgeon: Jose Ibrahim MD;  Location:  OR     SURGICAL HISTORY OF -       Hysterectomy     SURGICAL HISTORY OF -   3/2002    Right hammer toe/bunionectomy     Current Outpatient Medications   Medication Sig Dispense Refill     blood glucose (ACCU-CHEK JULIO CÉSAR PLUS) test strip 1 strip by In Vitro route 2 times daily 200 strip 3     CALCIUM 500 + D OR 1 tablet twice daily       FLAX SEED OIL OR twice daily       FREESTYLE LANCETS MISC 1 each by Lancet route 2 times daily 100 each prn     losartan (COZAAR) 50 MG tablet Take 1.5 tablets (75 mg) by mouth daily 180 tablet 2     metFORMIN (GLUCOPHAGE-XR) 500 MG 24 hr tablet Take 2 tablets (1,000 mg) by mouth 2 times daily (with meals) 370 tablet 3     pravastatin (PRAVACHOL) 40 MG tablet Take 1 tablet (40 mg) by mouth daily 90 tablet 3     aspirin 81 MG tablet Take  by mouth daily.       OTC products: None, except as noted above quit taking the asa last week     Allergies   Allergen Reactions     Nkda [No Known Drug Allergies]       Latex Allergy: NO    Social History     Tobacco Use     Smoking status: Former Smoker     Last attempt to quit: 1971     Years since quittin.7     Smokeless tobacco: Never Used   Substance Use Topics     Alcohol use: Yes     Comment: social     History   Drug Use No       REVIEW OF SYSTEMS:  "  Constitutional, neuro, ENT, endocrine, pulmonary, cardiac, gastrointestinal, genitourinary, musculoskeletal, integument and psychiatric systems are negative, except as otherwise noted.    EXAM:   /86   Pulse 75   Temp 98.5  F (36.9  C) (Tympanic)   Ht 1.645 m (5' 4.75\")   Wt 81.2 kg (179 lb)   SpO2 99%   BMI 30.02 kg/m      GENERAL APPEARANCE: healthy, alert and no distress     EYES: EOMI, PERRL     HENT: ear canals and TM's normal and nose and mouth without ulcers or lesions     NECK: no adenopathy, no asymmetry, masses, or scars and thyroid normal to palpation     RESP: lungs clear to auscultation - no rales, rhonchi or wheezes     CV: regular rates and rhythm, normal S1 S2, no S3 or S4 and no murmur, click or rub     ABDOMEN:  soft, nontender, no HSM or masses and bowel sounds normal     MS: extremities normal- no gross deformities noted and hammer toe - mallet toe deformity right      SKIN: no suspicious lesions or rashes     NEURO: Normal strength and tone, sensory exam grossly normal, mentation intact and speech normal     PSYCH: mentation appears normal. and affect normal/bright     LYMPHATICS: No cervical adenopathy    DIAGNOSTICS:     EKG: appears normal, NSR, normal axis, normal intervals, no acute ST/T changes c/w ischemia, no LVH by voltage criteria, unchanged from previous tracings  Labs Drawn and in Process:   Unresulted Labs Ordered in the Past 30 Days of this Admission     No orders found from 9/1/2019 to 10/2/2019.      awaiting labs     Recent Labs   Lab Test 08/22/19  0943 10/16/18  0808  06/24/18  0730 06/23/18  1827   HGB  --   --   --  12.0 13.4   PLT  --   --   --  208 220   INR  --   --   --   --  0.95    136  --  139  --    POTASSIUM 4.8 4.1  --  3.6  --    CR 0.47* 0.60  --  0.52  --    A1C 8.1* 8.2*   < >  --   --     < > = values in this interval not displayed.      Results for orders placed or performed in visit on 10/01/19   Basic metabolic panel   Result Value Ref " Range    Sodium 134 133 - 144 mmol/L    Potassium 4.0 3.4 - 5.3 mmol/L    Chloride 101 94 - 109 mmol/L    Carbon Dioxide 27 20 - 32 mmol/L    Anion Gap 6 3 - 14 mmol/L    Glucose 151 (H) 70 - 99 mg/dL    Urea Nitrogen 15 7 - 30 mg/dL    Creatinine 0.65 0.52 - 1.04 mg/dL    GFR Estimate 88 >60 mL/min/[1.73_m2]    GFR Estimate If Black >90 >60 mL/min/[1.73_m2]    Calcium 9.8 8.5 - 10.1 mg/dL         IMPRESSION:   Reason for surgery/procedure: toe contractures multiple    The proposed surgical procedure is considered INTERMEDIATE risk.    REVISED CARDIAC RISK INDEX  The patient has the following serious cardiovascular risks for perioperative complications such as (MI, PE, VFib and 3  AV Block):  No serious cardiac risks  INTERPRETATION: 0 risks: Class I (very low risk - 0.4% complication rate)    The patient has the following additional risks for perioperative complications:  No identified additional risks      ICD-10-CM    1. Preop general physical exam Z01.818 EKG 12-lead complete w/read - Clinics     Potassium     Glucose   2. 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 Type 2 diabetes mellitus with hyperlipidemia (H) E11.69 Glucose    E78.5        RECOMMENDATIONS:         --Patient is to take all scheduled medications on the day of surgery EXCEPT for modifications listed below.    Diabetes Medication Use    -----Hold usual oral and non-insulin diabetic meds (e.g. Metformin, Actos, Glipizide) while NPO.       ACE Inhibitor or Angiotensin Receptor Blocker (ARB) Use  Ace inhibitor or Angiotensin Receptor Blocker (ARB) and should HOLD this medication for the 24 hours prior to surgery.      Pending review of diagnostic evaluation, APPROVAL GIVEN to proceed with proposed procedure, without further diagnostic evaluation.       Signed Electronically by: Dorota Art MD    Copy of this evaluation report is provided to requesting physician.    [facility] Preop Guidelines    Revised Cardiac Risk Index

## 2019-10-01 NOTE — PATIENT INSTRUCTIONS
Before Your Surgery      Call your surgeon if there is any change in your health. This includes signs of a cold or flu (such as a sore throat, runny nose, cough, rash or fever).    Do not smoke, drink alcohol or take over the counter medicine (unless your surgeon or primary care doctor tells you to) for the 24 hours before and after surgery.    If you take prescribed drugs: Follow your doctor s orders about which medicines to take and which to stop until after surgery.    Eating and drinking prior to surgery: follow the instructions from your surgeon    Take a shower or bath the night before surgery. Use the soap your surgeon gave you to gently clean your skin. If you do not have soap from your surgeon, use your regular soap. Do not shave or scrub the surgery site.  Wear clean pajamas and have clean sheets on your bed.     Do not take the metformin or the losartan the day of surgery   Make sure that you eat a little something before 6am on the day of surgery.   Get your flu shot in the next 2 weeks

## 2019-10-02 ENCOUNTER — ANESTHESIA EVENT (OUTPATIENT)
Dept: SURGERY | Facility: CLINIC | Age: 74
End: 2019-10-02
Payer: MEDICARE

## 2019-10-03 ENCOUNTER — ANESTHESIA (OUTPATIENT)
Dept: SURGERY | Facility: CLINIC | Age: 74
End: 2019-10-03
Payer: MEDICARE

## 2019-10-03 ENCOUNTER — APPOINTMENT (OUTPATIENT)
Dept: GENERAL RADIOLOGY | Facility: CLINIC | Age: 74
End: 2019-10-03
Attending: PODIATRIST
Payer: MEDICARE

## 2019-10-03 ENCOUNTER — HOSPITAL ENCOUNTER (OUTPATIENT)
Facility: CLINIC | Age: 74
Discharge: HOME | End: 2019-10-03
Attending: PODIATRIST | Admitting: PODIATRIST
Payer: MEDICARE

## 2019-10-03 VITALS
TEMPERATURE: 97.8 F | OXYGEN SATURATION: 100 % | HEART RATE: 75 BPM | RESPIRATION RATE: 16 BRPM | SYSTOLIC BLOOD PRESSURE: 146 MMHG | WEIGHT: 180 LBS | DIASTOLIC BLOOD PRESSURE: 72 MMHG | HEIGHT: 64 IN | BODY MASS INDEX: 30.73 KG/M2

## 2019-10-03 DIAGNOSIS — G89.18 POST-OP PAIN: Primary | ICD-10-CM

## 2019-10-03 LAB — GLUCOSE BLDC GLUCOMTR-MCNC: 145 MG/DL (ref 70–99)

## 2019-10-03 PROCEDURE — 36000058 ZZH SURGERY LEVEL 3 EA 15 ADDTL MIN: Performed by: PODIATRIST

## 2019-10-03 PROCEDURE — 82962 GLUCOSE BLOOD TEST: CPT

## 2019-10-03 PROCEDURE — 25000128 H RX IP 250 OP 636: Performed by: PODIATRIST

## 2019-10-03 PROCEDURE — 40000277 XR SURGERY CARM FLUORO LESS THAN 5 MIN W STILLS: Mod: TC

## 2019-10-03 PROCEDURE — 25800030 ZZH RX IP 258 OP 636: Performed by: PODIATRIST

## 2019-10-03 PROCEDURE — C1713 ANCHOR/SCREW BN/BN,TIS/BN: HCPCS | Performed by: PODIATRIST

## 2019-10-03 PROCEDURE — 37000009 ZZH ANESTHESIA TECHNICAL FEE, EACH ADDTL 15 MIN: Performed by: PODIATRIST

## 2019-10-03 PROCEDURE — 25000125 ZZHC RX 250: Performed by: NURSE ANESTHETIST, CERTIFIED REGISTERED

## 2019-10-03 PROCEDURE — 71000027 ZZH RECOVERY PHASE 2 EACH 15 MINS: Performed by: PODIATRIST

## 2019-10-03 PROCEDURE — 36000060 ZZH SURGERY LEVEL 3 W FLUORO 1ST 30 MIN: Performed by: PODIATRIST

## 2019-10-03 PROCEDURE — 25000125 ZZHC RX 250: Performed by: PODIATRIST

## 2019-10-03 PROCEDURE — 40000305 ZZH STATISTIC PRE PROC ASSESS I: Performed by: PODIATRIST

## 2019-10-03 PROCEDURE — 37000008 ZZH ANESTHESIA TECHNICAL FEE, 1ST 30 MIN: Performed by: PODIATRIST

## 2019-10-03 PROCEDURE — 25000128 H RX IP 250 OP 636: Performed by: NURSE ANESTHETIST, CERTIFIED REGISTERED

## 2019-10-03 PROCEDURE — 27210794 ZZH OR GENERAL SUPPLY STERILE: Performed by: PODIATRIST

## 2019-10-03 DEVICE — IMPLANTABLE DEVICE: Type: IMPLANTABLE DEVICE | Site: FOOT | Status: FUNCTIONAL

## 2019-10-03 DEVICE — IMP PIN ARTHREX TRIM IT 1.5X100MM AR-4151DS: Type: IMPLANTABLE DEVICE | Site: FOOT | Status: FUNCTIONAL

## 2019-10-03 RX ORDER — BUPIVACAINE HYDROCHLORIDE 5 MG/ML
INJECTION, SOLUTION PERINEURAL PRN
Status: DISCONTINUED | OUTPATIENT
Start: 2019-10-03 | End: 2019-10-03 | Stop reason: HOSPADM

## 2019-10-03 RX ORDER — FENTANYL CITRATE 50 UG/ML
25-50 INJECTION, SOLUTION INTRAMUSCULAR; INTRAVENOUS
Status: DISCONTINUED | OUTPATIENT
Start: 2019-10-03 | End: 2019-10-03 | Stop reason: HOSPADM

## 2019-10-03 RX ORDER — KETOROLAC TROMETHAMINE 30 MG/ML
INJECTION, SOLUTION INTRAMUSCULAR; INTRAVENOUS PRN
Status: DISCONTINUED | OUTPATIENT
Start: 2019-10-03 | End: 2019-10-03

## 2019-10-03 RX ORDER — HYDRALAZINE HYDROCHLORIDE 20 MG/ML
2.5-5 INJECTION INTRAMUSCULAR; INTRAVENOUS EVERY 10 MIN PRN
Status: DISCONTINUED | OUTPATIENT
Start: 2019-10-03 | End: 2019-10-03 | Stop reason: HOSPADM

## 2019-10-03 RX ORDER — MEPERIDINE HYDROCHLORIDE 25 MG/ML
12.5 INJECTION INTRAMUSCULAR; INTRAVENOUS; SUBCUTANEOUS
Status: DISCONTINUED | OUTPATIENT
Start: 2019-10-03 | End: 2019-10-03 | Stop reason: HOSPADM

## 2019-10-03 RX ORDER — SODIUM CHLORIDE, SODIUM LACTATE, POTASSIUM CHLORIDE, CALCIUM CHLORIDE 600; 310; 30; 20 MG/100ML; MG/100ML; MG/100ML; MG/100ML
INJECTION, SOLUTION INTRAVENOUS CONTINUOUS
Status: DISCONTINUED | OUTPATIENT
Start: 2019-10-03 | End: 2019-10-03 | Stop reason: HOSPADM

## 2019-10-03 RX ORDER — KETAMINE HYDROCHLORIDE 10 MG/ML
INJECTION, SOLUTION INTRAMUSCULAR; INTRAVENOUS PRN
Status: DISCONTINUED | OUTPATIENT
Start: 2019-10-03 | End: 2019-10-03

## 2019-10-03 RX ORDER — LIDOCAINE HYDROCHLORIDE 10 MG/ML
INJECTION, SOLUTION INFILTRATION; PERINEURAL PRN
Status: DISCONTINUED | OUTPATIENT
Start: 2019-10-03 | End: 2019-10-03 | Stop reason: HOSPADM

## 2019-10-03 RX ORDER — ALBUTEROL SULFATE 0.83 MG/ML
2.5 SOLUTION RESPIRATORY (INHALATION) EVERY 4 HOURS PRN
Status: DISCONTINUED | OUTPATIENT
Start: 2019-10-03 | End: 2019-10-03 | Stop reason: HOSPADM

## 2019-10-03 RX ORDER — DEXAMETHASONE SODIUM PHOSPHATE 4 MG/ML
4 INJECTION, SOLUTION INTRA-ARTICULAR; INTRALESIONAL; INTRAMUSCULAR; INTRAVENOUS; SOFT TISSUE EVERY 10 MIN PRN
Status: DISCONTINUED | OUTPATIENT
Start: 2019-10-03 | End: 2019-10-03 | Stop reason: HOSPADM

## 2019-10-03 RX ORDER — OXYCODONE AND ACETAMINOPHEN 5; 325 MG/1; MG/1
1 TABLET ORAL
Status: DISCONTINUED | OUTPATIENT
Start: 2019-10-03 | End: 2019-10-03 | Stop reason: HOSPADM

## 2019-10-03 RX ORDER — ONDANSETRON 4 MG/1
4 TABLET, ORALLY DISINTEGRATING ORAL EVERY 30 MIN PRN
Status: DISCONTINUED | OUTPATIENT
Start: 2019-10-03 | End: 2019-10-03 | Stop reason: HOSPADM

## 2019-10-03 RX ORDER — CEFAZOLIN SODIUM 2 G/100ML
2 INJECTION, SOLUTION INTRAVENOUS
Status: COMPLETED | OUTPATIENT
Start: 2019-10-03 | End: 2019-10-03

## 2019-10-03 RX ORDER — HYDROXYZINE HYDROCHLORIDE 25 MG/1
25 TABLET, FILM COATED ORAL EVERY 4 HOURS PRN
Qty: 18 TABLET | Refills: 0 | Status: SHIPPED | OUTPATIENT
Start: 2019-10-03 | End: 2021-02-04

## 2019-10-03 RX ORDER — LIDOCAINE HYDROCHLORIDE 10 MG/ML
INJECTION, SOLUTION INFILTRATION; PERINEURAL PRN
Status: DISCONTINUED | OUTPATIENT
Start: 2019-10-03 | End: 2019-10-03

## 2019-10-03 RX ORDER — ONDANSETRON 2 MG/ML
4 INJECTION INTRAMUSCULAR; INTRAVENOUS EVERY 30 MIN PRN
Status: DISCONTINUED | OUTPATIENT
Start: 2019-10-03 | End: 2019-10-03 | Stop reason: HOSPADM

## 2019-10-03 RX ORDER — ONDANSETRON 2 MG/ML
INJECTION INTRAMUSCULAR; INTRAVENOUS PRN
Status: DISCONTINUED | OUTPATIENT
Start: 2019-10-03 | End: 2019-10-03

## 2019-10-03 RX ORDER — CEFAZOLIN SODIUM 1 G/50ML
1 INJECTION, SOLUTION INTRAVENOUS SEE ADMIN INSTRUCTIONS
Status: DISCONTINUED | OUTPATIENT
Start: 2019-10-03 | End: 2019-10-03 | Stop reason: HOSPADM

## 2019-10-03 RX ORDER — HYDROMORPHONE HYDROCHLORIDE 1 MG/ML
.3-.5 INJECTION, SOLUTION INTRAMUSCULAR; INTRAVENOUS; SUBCUTANEOUS EVERY 10 MIN PRN
Status: DISCONTINUED | OUTPATIENT
Start: 2019-10-03 | End: 2019-10-03 | Stop reason: HOSPADM

## 2019-10-03 RX ORDER — FENTANYL CITRATE 50 UG/ML
INJECTION, SOLUTION INTRAMUSCULAR; INTRAVENOUS PRN
Status: DISCONTINUED | OUTPATIENT
Start: 2019-10-03 | End: 2019-10-03

## 2019-10-03 RX ORDER — NALOXONE HYDROCHLORIDE 0.4 MG/ML
.1-.4 INJECTION, SOLUTION INTRAMUSCULAR; INTRAVENOUS; SUBCUTANEOUS
Status: DISCONTINUED | OUTPATIENT
Start: 2019-10-03 | End: 2019-10-03 | Stop reason: HOSPADM

## 2019-10-03 RX ORDER — GLYCOPYRROLATE 0.2 MG/ML
INJECTION, SOLUTION INTRAMUSCULAR; INTRAVENOUS PRN
Status: DISCONTINUED | OUTPATIENT
Start: 2019-10-03 | End: 2019-10-03

## 2019-10-03 RX ORDER — PROPOFOL 10 MG/ML
INJECTION, EMULSION INTRAVENOUS CONTINUOUS PRN
Status: DISCONTINUED | OUTPATIENT
Start: 2019-10-03 | End: 2019-10-03

## 2019-10-03 RX ORDER — OXYCODONE AND ACETAMINOPHEN 5; 325 MG/1; MG/1
1-2 TABLET ORAL EVERY 4 HOURS PRN
Qty: 18 TABLET | Refills: 0 | Status: SHIPPED | OUTPATIENT
Start: 2019-10-03 | End: 2020-10-13

## 2019-10-03 RX ADMIN — CEFAZOLIN SODIUM 2 G: 2 INJECTION, SOLUTION INTRAVENOUS at 14:53

## 2019-10-03 RX ADMIN — FENTANYL CITRATE 50 MCG: 50 INJECTION, SOLUTION INTRAMUSCULAR; INTRAVENOUS at 14:52

## 2019-10-03 RX ADMIN — KETAMINE HYDROCHLORIDE 20 MG: 10 INJECTION INTRAMUSCULAR; INTRAVENOUS at 15:08

## 2019-10-03 RX ADMIN — MIDAZOLAM HYDROCHLORIDE 2 MG: 1 INJECTION, SOLUTION INTRAMUSCULAR; INTRAVENOUS at 14:52

## 2019-10-03 RX ADMIN — FENTANYL CITRATE 50 MCG: 50 INJECTION, SOLUTION INTRAMUSCULAR; INTRAVENOUS at 15:06

## 2019-10-03 RX ADMIN — LIDOCAINE HYDROCHLORIDE 50 MG: 10 INJECTION, SOLUTION INFILTRATION; PERINEURAL at 14:55

## 2019-10-03 RX ADMIN — KETAMINE HYDROCHLORIDE 10 MG: 10 INJECTION INTRAMUSCULAR; INTRAVENOUS at 15:14

## 2019-10-03 RX ADMIN — MIDAZOLAM HYDROCHLORIDE 1 MG: 1 INJECTION, SOLUTION INTRAMUSCULAR; INTRAVENOUS at 15:14

## 2019-10-03 RX ADMIN — GLYCOPYRROLATE 0.2 MG: 0.2 INJECTION, SOLUTION INTRAMUSCULAR; INTRAVENOUS at 14:55

## 2019-10-03 RX ADMIN — PROPOFOL 75 MCG/KG/MIN: 10 INJECTION, EMULSION INTRAVENOUS at 14:55

## 2019-10-03 RX ADMIN — ONDANSETRON 4 MG: 2 INJECTION INTRAMUSCULAR; INTRAVENOUS at 15:31

## 2019-10-03 RX ADMIN — KETOROLAC TROMETHAMINE 15 MG: 30 INJECTION, SOLUTION INTRAMUSCULAR at 15:44

## 2019-10-03 RX ADMIN — KETAMINE HYDROCHLORIDE 20 MG: 10 INJECTION INTRAMUSCULAR; INTRAVENOUS at 14:57

## 2019-10-03 RX ADMIN — SODIUM CHLORIDE, POTASSIUM CHLORIDE, SODIUM LACTATE AND CALCIUM CHLORIDE: 600; 310; 30; 20 INJECTION, SOLUTION INTRAVENOUS at 13:39

## 2019-10-03 RX ADMIN — MIDAZOLAM HYDROCHLORIDE 2 MG: 1 INJECTION, SOLUTION INTRAMUSCULAR; INTRAVENOUS at 14:54

## 2019-10-03 ASSESSMENT — MIFFLIN-ST. JEOR: SCORE: 1311.47

## 2019-10-03 NOTE — ANESTHESIA PREPROCEDURE EVALUATION
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    Procedure(s):  2nd metatarsophalangeal stabilization laterally, 3rd toe correction, proximal interphalangeal joint fusion and 4th toe flexor release.    Past Medical History:   Diagnosis Date     Need for prophylactic hormone replacement therapy (postmenopausal)      PONV (postoperative nausea and vomiting)      Past Surgical History:   Procedure Laterality Date     ARTHRODESIS FOOT Right 10/23/2014    Procedure: ARTHRODESIS FOOT;  Surgeon: Mauri Gomez DPM;  Location: [facility]     COLONOSCOPY  10/10/2007    Repeat in 5 years     COLONOSCOPY N/A 2018    Procedure: COLONOSCOPY;  Surgeon: Magno Huber MD;  Location: [facility]     LAPAROSCOPIC HERNIORRHAPHY VENTRAL N/A 2018    Procedure: LAPAROSCOPIC HERNIORRHAPHY VENTRAL;  LAPAROSCOPIC HERNIORRHAPHY VENTRAL ;  Surgeon: Jose Ibrahim MD;  Location: [facility]     SURGICAL HISTORY OF -       Hysterectomy     SURGICAL HISTORY OF -   3/2002    Right hammer toe/bunionectomy       Anesthesia Evaluation     . Pt has had prior anesthetic. Type: General and MAC    History of anesthetic complications   - PONV        ROS/MED HX    ENT/Pulmonary:  - neg pulmonary ROS     Neurologic:  - neg neurologic ROS     Cardiovascular:     (+) Dyslipidemia, hypertension----. : . . . :. . Previous cardiac testing date:results:date: results:ECG reviewed date: results:Sinus Rhythm   -Old inferior infarct.     ABNORMAL date: results:          METS/Exercise Tolerance:  >4 METS   Hematologic:  - neg hematologic  ROS       Musculoskeletal:  - neg musculoskeletal ROS       GI/Hepatic:     (+) Other GI/Hepatic diverticulosis      Renal/Genitourinary:  - ROS Renal section negative       Endo:     (+) type II DM Last HgA1c: 8.1 date:  Not using insulin - not using insulin pump  "Normal glucose range: 145 Obesity, .      Psychiatric:  - neg psychiatric ROS       Infectious Disease:  - neg infectious disease ROS       Malignancy:      - no malignancy   Other:    - neg other ROS                      Physical Exam  Normal systems: cardiovascular, pulmonary and dental    Airway   Mallampati: II  TM distance: >3 FB  Neck ROM: full    Dental     Cardiovascular       Pulmonary             Lab Results   Component Value Date    WBC 7.2 06/24/2018    HGB 12.0 06/24/2018    HCT 35.7 06/24/2018     06/24/2018    CRP <2.9 08/15/2014     10/01/2019    POTASSIUM 4.0 10/01/2019    CHLORIDE 101 10/01/2019    CO2 27 10/01/2019    BUN 15 10/01/2019    CR 0.65 10/01/2019     (H) 10/01/2019    LEROY 9.8 10/01/2019    ALBUMIN 3.6 06/23/2018    PROTTOTAL 7.0 06/23/2018    ALT 28 06/23/2018    AST 20 06/23/2018    ALKPHOS 69 06/23/2018    BILITOTAL 0.3 06/23/2018    LIPASE 169 06/23/2018    INR 0.95 06/23/2018    TSH 1.19 07/11/2018       Preop Vitals  BP Readings from Last 3 Encounters:   10/03/19 (!) 177/87   10/01/19 139/86   08/22/19 (!) 152/80    Pulse Readings from Last 3 Encounters:   10/01/19 75   08/22/19 72   07/30/19 77      Resp Readings from Last 3 Encounters:   10/03/19 16   12/06/18 16   07/11/18 12    SpO2 Readings from Last 3 Encounters:   10/03/19 99%   10/01/19 99%   07/30/19 97%      Temp Readings from Last 1 Encounters:   10/03/19 36.7  C (98  F) (Oral)    Ht Readings from Last 1 Encounters:   10/03/19 1.626 m (5' 4\")      Wt Readings from Last 1 Encounters:   10/03/19 81.6 kg (180 lb)    Estimated body mass index is 30.9 kg/m  as calculated from the following:    Height as of this encounter: 1.626 m (5' 4\").    Weight as of this encounter: 81.6 kg (180 lb).       Anesthesia Plan      History & Physical Review  History and physical reviewed and following examination; no interval change.    ASA Status:  3 .    NPO Status:  > 6 hours    Plan for MAC Maintenance will be Balanced. "  Reason for MAC:  Deep or markedly invasive procedure (G8)  PONV prophylaxis:  Ondansetron (or other 5HT-3) and Dexamethasone or Solumedrol       Postoperative Care  Postoperative pain management:  IV analgesics and Oral pain medications.      Consents  Anesthetic plan, risks, benefits and alternatives discussed with:  Patient..                 KAMAR Riojas CRNA

## 2019-10-03 NOTE — BRIEF OP NOTE
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   Post-operative diagnosis * No post-op diagnosis entered *   Procedure: Procedure(s):  RIGHT 2ND METATARSAL PHALANGEAL JOINT REPAIR OF CAPSULE/STABILIZATION, 3RD TOE PROXIMAL INTERPHALANGEAL JOINT FUSION; 4TH TOE CORRECTION   Surgeon: Mauri Gomez DPM   Anesthesia: Combined MAC with Local    Estimated blood loss: Less than 10 ml   Blood transfusion: No transfusion was given during surgery   Drains: None   Specimens: None   Findings: Transverse plane medial instability of the right 2nd toe with compromised lateral capsular tissues.  Post prior surgical care sites noted.  Contractures of the 2, 3, 4 toes associated, flexor tendons involved.   Complications: None   Condition: Stable   Comments: See dictated operative report for full details.           Mauri Gomez DPM, FACFAS  Foot & Ankle Surgeon/Specialist  [facility]

## 2019-10-03 NOTE — ANESTHESIA CARE TRANSFER NOTE
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  Diagnosis Additional Information: No value filed.    Anesthesia Type:   MAC     Note:  Airway :Face Mask  Patient transferred to:Phase II  Handoff Report: Identifed the Patient, Identified the Reponsible Provider, Reviewed the pertinent medical history, Discussed the surgical course, Reviewed Intra-OP anesthesia mangement and issues during anesthesia, Set expectations for post-procedure period and Allowed opportunity for questions and acknowledgement of understanding      Vitals: (Last set prior to Anesthesia Care Transfer)    CRNA VITALS  10/3/2019 1524 - 10/3/2019 1556      10/3/2019             Pulse:  91    SpO2:  97 %    Resp Rate (observed):  23                Electronically Signed By: KAMAR Márquez CRNA  October 3, 2019  3:56 PM

## 2019-10-03 NOTE — OP NOTE
[facility] OPERATIVE REPORT  Operative Report - Orthopedics  Mackenzie Trinidad,  1945, MRN [id number]    Surgery Date: 10/03/19    PCP: Dorota Art, [phone]   Code status:  Prior       OPERATION SITE:  [facility]       OPERATIVE REPORT  DR. MAURI KNOWLES  FOOT & ANKLE SURGEON  [facility]    DATE OF PROCEDURE: 10/03/19    SITE: [facility]    SURGEON: Dr. Mauri Knowles - [facility]    ASSISTANT: Sasha Early PGYIII - [facility] - Resident Surgeon      Pre-Operative Diagnosis:  1.   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  Right foot 3rd and 4th toe contractures    Post-Operative Diagnosis:  1.   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  Right foot 3rd and 4th toe contractures    Procedures Performed:  1.  Right second MTPJ capsular repair with internal augmentation  2.  Right second MTP the extensor tenotomy lengthening  3.  Right third toe PIPJ correction, realignment arthrodesis  4.  Right second, third and fourth toe plantar percutaneous flexor tenotomy procedures  5.  Deep hardware removal right foot 2nd metatarsal head  6.  Intraoperative fluoroscopic assistance    Anesthesia: Monitored Anesthesia Care with Local/Regional  Hemostasis: Ankle Tourniquet at 250mmHg  EBL: < 10 mL  Findings:  Transverse plane medial instability of the right 2nd toe with compromised lateral capsular tissues.  Post prior surgical care sites noted.  Contractures of the 2, 3, 4 toes associated, flexor tendons involved.  Implants: Arthrex miniature internal brace augmentation system utilized to repair the transverse plane instability of the right second MTP about the plantar lateral aspect as there is insufficiency and compromise here.  To fixate with an all inside method the 1.5 mm bioabsorbable trim it pin from Arthrex was utilized about the third toe PIPJ.  Patient's right foot.  About the second  MTPJ a dorsal  Specimens: None    Indications for the Operation:  The patient has been seen and evaluated in clinic for the above-mentioned diagnoses.  They have failed to respond to nonoperative care measures and/or surgical care for condition was indicated.  They have elected to proceed as recommended/indicated with surgical care after a thorough discussion of the associated pros, cons, risks and benefits of the operations as well as the postoperative course and details.  All associated questions were answered.  Verbal and written form informed consent was obtained.  Please see additional information within the clinical notes.    Description of the Procedure:  Patient was seen and evaluated in the preoperative holding area.  The surgical site was marked.  The consent was signed.  The H&P was updated/reviewed.  Patient was transported from the preoperative holding area to the surgical suite.  The patient was placed on the operative table.  Anesthesia was obtained.  Antibiotics were administered via IV.  Tourniquet was applied.  The operative extremity was prepped and draped sterilely.  Then, a timeout was performed to identify the proper patient, surgical site and the procedures to be performed.  Local anesthetic was infiltrated about the operative margins for regional blockade utilizing a one-to-one mixture of 2% lidocaine plain and 0.5% Marcaine plain approximately 36 cc of the mixture was utilized.  The foot/ankle was exsanguinated, and the tourniquet was inflated.    Attention was then directed to the 4 to 5 cm linear incision was made following the old incisional line.  About the third toe PIPJ a 2.5 cm incision was made.  These were both dissected down in layers with neurovascular identification and retraction.  Dorsal extensor contracture of the second MTPJ appreciated.  This was divided with the extensor tenotomy.  The capsule was released.  There is insufficient lateral inferior plantar capsule that need  to be repaired.  This was repaired with an internal mini brace applied about the lateral inferior proximal phalanx and the lateral inferior second metatarsal.  The 2 screws were removed from the second metatarsal at site of prior osteotomy.  The remainder the capsule was intact.  This miniature internal augment brace was applied under tension to allow for transverse and plantar flexion contracture correction.  This was applied under tension and the 2 miniature bio swivel lock anchors were applied.  Of note, the one internal anchor stem did break which was metallic but within the bone and will likely not be of consequence to the patient.  This improve the position the second MTP.  Irrigation conducted here lateral capsular raphae also conducted with 2-0 Vicryl and FiberWire.  The extensor tendon was repaired at length.  The second toe PIPJ alignment remains stable and without need for intervention with mild remaining distal interphalangeal joint contracture.  The second toe PIPJ a small sagittal saw was utilized to remove the distal aspect of the proximal phalanx and the base the middle phalanx 40 realignment arthrodesis.  With an all inside method a 1.5 mm bioabsorbable pin 3 cm section was applied for a 10 degree flexed fusion.  This was able to be obtained.  To address the flexor tendon contracture of the second, third and fourth digits and inferior percutaneous plantar flexor tenotomy was conducted to the middle of the middle phalanx improving the alignment with manual release.  Final fluoroscopic images were obtained and sent to the imaging system within [facility] and to be forwarded to [facility].    Following this, thorough irrigation of the surgical sites was conducted.  Layered, anatomic closure completed with 2-0 Vicryl, 3-0 Vicryl and 3-0 nylon with careful apposition of the skin and surfaces for primary healing.  A compressive sterile splint/dressing was applied.  Vascular status was intact  after deflation of the tourniquet.    COMPLICATIONS: No direct complications encountered throughout the case.    The patient tolerated the procedure & anesthesia well.  They were transported from the operative suite to the postoperative holding area.  The patient was given postoperative orders as well as specific postoperative instructions which were reviewed by the nursing staff.  Orders were placed for weightbearing status/activity, postoperative oral pain management, DVT prophylaxis measures both with mechanical and medicinal measures reviewed.  Splint/dressing care measures were reviewed as well as appropriate cryotherapy measures and nutrition.  Postoperative follow-up to be conducted in the next 10-14 days for outpatient clinical follow-up in the Orthopedic clinic at [facility].  If concerns or questions arise or develop they will contact our clinic and postoperative contact numbers provided.  Case details and post-operative care requirements reviewed with family/support present today.  Additionally, a detailed postoperative instruction sheet was provided to the patient and family.  All additional questions were answered postoperatively.    Please note that this report was completed with the assistance of voice recognition and transcription services.  Although every effort has been made to correct and avoid errors, errors may remain.    Dr. Mauri Gomez DPM, FACFAS  Foot & Ankle Surgeon/Specialist  [facility]          CC: [facility], Dr. Gomez's Clinical Team

## 2019-10-03 NOTE — ANESTHESIA POSTPROCEDURE EVALUATION
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  Diagnosis Additional Information: No value filed.    Anesthesia Type:  MAC    Note:  Anesthesia Post Evaluation    Patient location during evaluation: Bedside  Patient participation: Able to fully participate in evaluation  Level of consciousness: awake and alert  Pain management: adequate  Airway patency: patent  Cardiovascular status: acceptable  Respiratory status: acceptable  Hydration status: acceptable  PONV: none     Anesthetic complications: None          Last vitals:  Vitals:    10/03/19 1600 10/03/19 1630 10/03/19 1700   BP: 130/68 (!) 153/78 (!) 146/72   Pulse:  63 75   Resp: 16 16 16   Temp: 36.6  C (97.8  F)     SpO2: 96% 99% 100%         Electronically Signed By: KAMAR Haddad CRNA  October 3, 2019  6:35 PM

## 2019-10-03 NOTE — DISCHARGE INSTRUCTIONS
Same Day Surgery Discharge Instructions  Special Precautions After Surgery - Adult    1. It is not unusual to feel lightheaded or faint, up to 24 hours after surgery or while taking pain medication.  If you have these symptoms; sit for a few minutes before standing and have someone assist you when getting up.  2. You should rest and relax for the next 24 hours and must have someone stay with you for at least 24 hours after your discharge.  3. DO NOT DRIVE any vehicle or operate mechanical equipment for 24 hours following the end of your surgery.  DO NOT DRIVE while taking narcotic pain medications that have been prescribed by your physician.  If you had a limb operated on, you must be able to use it fully to drive.  4. DO NOT drink alcoholic beverages for 24 hours following surgery or while taking prescription pain medication.  5. Drink clear liquids (apple juice, ginger ale, broth, 7-Up, etc.).  Progress to your regular diet as you feel able.  6. Any questions call your physician and do not make important decisions for 24 hours.      __________________________________________________________________________________________________________________________________  IMPORTANT NUMBERS:    [facility] Main Number:  [phone], [phone]  Pharmacy:  [phone]  Same Day Surgery:  [phone], Monday - Friday until 8:30 p.m.  Urgent Care:  [phone]  Emergency Room:  [phone]                                                                               [facility]:  [phone]  --  Dr. Gomez

## 2019-10-14 ENCOUNTER — AMBULATORY - HEALTHEAST (OUTPATIENT)
Dept: OTHER | Facility: CLINIC | Age: 74
End: 2019-10-14

## 2019-10-14 ENCOUNTER — TRANSFERRED RECORDS (OUTPATIENT)
Dept: HEALTH INFORMATION MANAGEMENT | Facility: CLINIC | Age: 74
End: 2019-10-14

## 2019-10-14 ENCOUNTER — DOCUMENTATION ONLY (OUTPATIENT)
Dept: OTHER | Facility: CLINIC | Age: 74
End: 2019-10-14

## 2019-10-15 ENCOUNTER — ALLIED HEALTH/NURSE VISIT (OUTPATIENT)
Dept: EDUCATION SERVICES | Facility: CLINIC | Age: 74
End: 2019-10-15
Payer: MEDICARE

## 2019-10-15 DIAGNOSIS — E78.5 TYPE 2 DIABETES MELLITUS WITH HYPERLIPIDEMIA (H): ICD-10-CM

## 2019-10-15 DIAGNOSIS — E11.9 TYPE 2 DIABETES, HBA1C GOAL < 7% (H): Primary | ICD-10-CM

## 2019-10-15 DIAGNOSIS — E11.69 TYPE 2 DIABETES MELLITUS WITH HYPERLIPIDEMIA (H): ICD-10-CM

## 2019-10-15 PROCEDURE — 97802 MEDICAL NUTRITION INDIV IN: CPT

## 2019-10-15 NOTE — PATIENT INSTRUCTIONS
Breakfast (yogurt/fruit) is about 40 grams of carbohydrate, which is fine.  Try doing a 2 hour after meal check.  Could try pulling the banana out to a snack.     Could try Fairlife milk (lower carbohydrate and higher protein)     Pair the carbohydrate snack with a protein  Apple/cheese or peanut butter   Nuts  Cottage cheese   Hard boiled eggs   Avocado on crackers   Count crackers for portion size   Snap peas/peppers and hummus     Keep up the great work!     Francisca Yoder RD, LD, CDE  For Diabetes Education appointments call: [phone]   For Diabetes Education Related Questions call: [phone] or email: [email or web address]

## 2019-10-15 NOTE — PROGRESS NOTES
MEDICAL NUTRITION THERAPY  Visit Type:Initial assessment and intervention  Diabetes/MNT education in the past 24mo: No  Focus of Visit: Healthy Eating  Diabetes type: Type 2  Date of diagnosis: January of 2012  Disease course: Getting harder to manage    SUBJECTIVE:   Mackenzie Trinidad presents today for MNT and education related to type 2 diabetes.   She is accompanied by self.     PATIENT STATED GOAL(S) FOR THIS VISIT: review diet intervention, would like to avoid medication changes at this time.  Has a new A1c in a couple months.     EATING HABITS:   Cultural/Confucianism diet restrictions?: No  Patient on a regular basis: Eats 3 meals a day  Meals include: Breakfast, Lunch, Dinner  Breakfast: 1 cup greek yogurt plain + 1/2 tsp honey or vanilla + 1/2 cup fresh bluberries/strawberries + 1/2 banana  OR scrambled eggs, galvan, toast .  Black coffee and water.  Sometimes orange juice   Lunch: not hungry until lunch;  1/2 ham and cheese sandwich + 1/2 cup tomato soup + 1 cup milk   Dinner: meat or fish/starch/veggie.   Salads/squash   Snacks: more snacks come in the middle of the afternoon, 4pm.  Fruit or protein bar or cheese/crackers.  Likes veggies raw or cooked / hummus  Other: sometimes goes out - likes salads.  Tries to keep desserts to a minimum   Beverages: Water, Coffee, Milk, Juice    Missed meals? No   Eats out:  A few times weekly   Food allergies/intolerances: NKFA    EXERCISE:   Being Active Assessed Today: Yes  Barrier to exercise: Physical limitation; recent surgery.  Had toe pain for the last year.   Used to do yoga, silver sneakers at the [facility].  Likes to walk on the treadmill.  Was walking up until a year ago when toe pain occurred.  Will resume activity when healed post op.     SOCIO/ECONOMIC:   Lives with: spouse    OBJECTIVE:   Wt Readings from Last 5 Encounters:   10/03/19 81.6 kg (180 lb)   10/01/19 81.2 kg (179 lb)   08/22/19 81.6 kg (180 lb)   07/30/19 81.9 kg (180 lb 9.6 oz)   12/06/18 81.2 kg  (179 lb)     Lab Results   Component Value Date    A1C 8.1 2019    A1C 8.2 10/16/2018    A1C 8.1 2018    A1C 7.5 2017    A1C 9.4 2016       Monitoring  Monitoring Assessed Today: Yes  Did patient bring glucose meter to appointment? : Yes  Blood Glucose Meter: Accu-check  Fastin, 155, 174, 198, 138, 148  182, 168, 126 before dinner  180-250 post prandial     Assessment / Intervention   Patient feels that she has not been watching intake closely.  Looks at labels, but doesn't modify intake much.  Reviewed portion control and aiming for 3-4 carbohydrate choices at meals and 1-2 at snacks.   Discussed: general nutrition guidelines, carb counting, labeling, artificial sweeteners, exercise, dining out/special occasions, behavior modification and portion control.    PLAN:   See Patient Instructions for patient stated behavior change goals. AVS was printed and given to patient at today's appointment.    FOLLOW UP:   Follow up with RD as needed.    Francisca Yoder RD, LD, CDE  Diabetes Education    Time spent in minutes: 45  Encounter: Individual

## 2019-10-17 ENCOUNTER — TRANSFERRED RECORDS (OUTPATIENT)
Dept: HEALTH INFORMATION MANAGEMENT | Facility: CLINIC | Age: 74
End: 2019-10-17

## 2019-10-28 ENCOUNTER — TRANSFERRED RECORDS (OUTPATIENT)
Dept: HEALTH INFORMATION MANAGEMENT | Facility: CLINIC | Age: 74
End: 2019-10-28

## 2019-10-28 LAB — RETINOPATHY: NEGATIVE

## 2019-11-04 ENCOUNTER — HEALTH MAINTENANCE LETTER (OUTPATIENT)
Age: 74
End: 2019-11-04

## 2019-11-13 ENCOUNTER — TRANSFERRED RECORDS (OUTPATIENT)
Dept: HEALTH INFORMATION MANAGEMENT | Facility: CLINIC | Age: 74
End: 2019-11-13

## 2019-12-26 ENCOUNTER — TRANSFERRED RECORDS (OUTPATIENT)
Dept: HEALTH INFORMATION MANAGEMENT | Facility: CLINIC | Age: 74
End: 2019-12-26

## 2020-08-26 DIAGNOSIS — H93.11 TINNITUS, RIGHT: ICD-10-CM

## 2020-08-26 RX ORDER — LOSARTAN POTASSIUM 50 MG/1
75 TABLET ORAL DAILY
Qty: 180 TABLET | Refills: 0 | Status: SHIPPED | OUTPATIENT
Start: 2020-08-26 | End: 2020-10-13 | Stop reason: DRUGHIGH

## 2020-08-26 NOTE — TELEPHONE ENCOUNTER
Medication is being filled for 1 time refill only due to:  Needs to be seen as it will be one year.  BP Readings from Last 4 Encounters:   10/03/19 (!) 146/72   10/01/19 139/86   08/22/19 (!) 152/80   07/30/19 (!) 152/86     Aramis Torres RN

## 2020-09-08 DIAGNOSIS — E11.9 TYPE 2 DIABETES, HBA1C GOAL < 7% (H): ICD-10-CM

## 2020-09-10 RX ORDER — BLOOD SUGAR DIAGNOSTIC
STRIP MISCELLANEOUS
Qty: 200 STRIP | Refills: 0 | Status: SHIPPED | OUTPATIENT
Start: 2020-09-10 | End: 2021-04-29

## 2020-09-10 NOTE — TELEPHONE ENCOUNTER
Medication is being filled for 1 time refill only due to:  Patient needs labs lipis,bmp,hgba1c.microalbumin. Future labs ordered yes. Patient needs to be seen because it will be one year.   Aramis Torres RN       Yes

## 2020-09-18 DIAGNOSIS — E11.69 TYPE 2 DIABETES MELLITUS WITH HYPERLIPIDEMIA (H): ICD-10-CM

## 2020-09-18 DIAGNOSIS — E78.5 TYPE 2 DIABETES MELLITUS WITH HYPERLIPIDEMIA (H): ICD-10-CM

## 2020-09-18 DIAGNOSIS — E78.5 HYPERLIPIDEMIA LDL GOAL <100: ICD-10-CM

## 2020-09-21 RX ORDER — METFORMIN HCL 500 MG
1000 TABLET, EXTENDED RELEASE 24 HR ORAL 2 TIMES DAILY WITH MEALS
Qty: 360 TABLET | Refills: 0 | Status: SHIPPED | OUTPATIENT
Start: 2020-09-21 | End: 2020-12-26

## 2020-09-21 RX ORDER — PRAVASTATIN SODIUM 40 MG
40 TABLET ORAL DAILY
Qty: 90 TABLET | Refills: 0 | Status: SHIPPED | OUTPATIENT
Start: 2020-09-21 | End: 2020-12-26

## 2020-09-21 NOTE — TELEPHONE ENCOUNTER
Medication is being filled for 1 time refill only due to:  Patient needs labs lipids,bmp,hgba1c,microalumin. Future labs ordered yes. Patient needs to be seen because as it will be one year.   Aramis Torres RN

## 2020-10-12 ENCOUNTER — HOSPITAL ENCOUNTER (OUTPATIENT)
Dept: MAMMOGRAPHY | Facility: CLINIC | Age: 75
Discharge: HOME | End: 2020-10-12
Attending: FAMILY MEDICINE | Admitting: FAMILY MEDICINE
Payer: MEDICARE

## 2020-10-12 DIAGNOSIS — E11.9 TYPE 2 DIABETES, HBA1C GOAL < 7% (H): ICD-10-CM

## 2020-10-12 DIAGNOSIS — Z12.31 VISIT FOR SCREENING MAMMOGRAM: ICD-10-CM

## 2020-10-12 LAB
ANION GAP SERPL CALCULATED.3IONS-SCNC: 5 MMOL/L (ref 3–14)
BUN SERPL-MCNC: 15 MG/DL (ref 7–30)
CALCIUM SERPL-MCNC: 9.1 MG/DL (ref 8.5–10.1)
CHLORIDE SERPL-SCNC: 102 MMOL/L (ref 94–109)
CHOLEST SERPL-MCNC: 177 MG/DL
CO2 SERPL-SCNC: 28 MMOL/L (ref 20–32)
CREAT SERPL-MCNC: 0.63 MG/DL (ref 0.52–1.04)
GFR SERPL CREATININE-BSD FRML MDRD: 89 ML/MIN/{1.73_M2}
GLUCOSE SERPL-MCNC: 201 MG/DL (ref 70–99)
HBA1C MFR BLD: 8.3 % (ref 0–5.6)
HDLC SERPL-MCNC: 80 MG/DL
LDLC SERPL CALC-MCNC: 66 MG/DL
NONHDLC SERPL-MCNC: 97 MG/DL
POTASSIUM SERPL-SCNC: 4.1 MMOL/L (ref 3.4–5.3)
SODIUM SERPL-SCNC: 135 MMOL/L (ref 133–144)
TRIGL SERPL-MCNC: 155 MG/DL

## 2020-10-12 PROCEDURE — 83036 HEMOGLOBIN GLYCOSYLATED A1C: CPT | Performed by: FAMILY MEDICINE

## 2020-10-12 PROCEDURE — 80061 LIPID PANEL: CPT | Performed by: FAMILY MEDICINE

## 2020-10-12 PROCEDURE — 80048 BASIC METABOLIC PNL TOTAL CA: CPT | Performed by: FAMILY MEDICINE

## 2020-10-12 PROCEDURE — 77063 BREAST TOMOSYNTHESIS BI: CPT

## 2020-10-13 ENCOUNTER — OFFICE VISIT (OUTPATIENT)
Dept: FAMILY MEDICINE | Facility: CLINIC | Age: 75
End: 2020-10-13
Payer: MEDICARE

## 2020-10-13 VITALS
SYSTOLIC BLOOD PRESSURE: 146 MMHG | WEIGHT: 181.13 LBS | HEART RATE: 80 BPM | TEMPERATURE: 98.2 F | DIASTOLIC BLOOD PRESSURE: 80 MMHG | BODY MASS INDEX: 30.92 KG/M2 | RESPIRATION RATE: 16 BRPM | HEIGHT: 64 IN

## 2020-10-13 DIAGNOSIS — E78.5 HYPERLIPIDEMIA LDL GOAL <100: ICD-10-CM

## 2020-10-13 DIAGNOSIS — E11.9 TYPE 2 DIABETES, HBA1C GOAL < 7% (H): Primary | ICD-10-CM

## 2020-10-13 DIAGNOSIS — I10 BENIGN ESSENTIAL HYPERTENSION: ICD-10-CM

## 2020-10-13 PROCEDURE — 99207 PR FOOT EXAM NO CHARGE: CPT | Performed by: FAMILY MEDICINE

## 2020-10-13 PROCEDURE — 99214 OFFICE O/P EST MOD 30 MIN: CPT | Performed by: FAMILY MEDICINE

## 2020-10-13 RX ORDER — GLIMEPIRIDE 1 MG/1
1 TABLET ORAL
Qty: 90 TABLET | Refills: 3 | Status: SHIPPED | OUTPATIENT
Start: 2020-10-13 | End: 2021-09-27

## 2020-10-13 RX ORDER — LOSARTAN POTASSIUM 100 MG/1
100 TABLET ORAL DAILY
Qty: 90 TABLET | Refills: 3 | Status: SHIPPED | OUTPATIENT
Start: 2020-10-13 | End: 2021-10-26

## 2020-10-13 ASSESSMENT — MIFFLIN-ST. JEOR: SCORE: 1311.58

## 2020-10-13 ASSESSMENT — PAIN SCALES - GENERAL: PAINLEVEL: NO PAIN (0)

## 2020-10-13 NOTE — PROGRESS NOTES
Subjective     Mackenzie Trinidad is a 75 year old female who presents to clinic today for the following health issues:    HPI         Diabetes Follow-up    How often are you checking your blood sugar? Two times daily  Blood sugar testing frequency justification:  Uncontrolled diabetes  What time of day are you checking your blood sugars (select all that apply)?  Before and after meals  Have you had any blood sugars above 200?  Yes   Have you had any blood sugars below 70?  No    What symptoms do you notice when your blood sugar is low?  None    What concerns do you have today about your diabetes? None     Do you have any of these symptoms? (Select all that apply)  No numbness or tingling in feet.  No redness, sores or blisters on feet.  No complaints of excessive thirst.  No reports of blurry vision.  No significant changes to weight.    Have you had a diabetic eye exam in the last 12 months? No          Hyperlipidemia Follow-Up      Are you regularly taking any medication or supplement to lower your cholesterol?   Yes- statin    Are you having muscle aches or other side effects that you think could be caused by your cholesterol lowering medication?  No    Hypertension Follow-up      Do you check your blood pressure regularly outside of the clinic? Yes     Are you following a low salt diet? Yes    Are your blood pressures ever more than 140 on the top number (systolic) OR more   than 90 on the bottom number (diastolic), for example 140/90? No  Numbers are all lower at home   BP Readings from Last 2 Encounters:   10/13/20 (!) 146/80   10/03/19 (!) 146/72     Hemoglobin A1C (%)   Date Value   10/12/2020 8.3 (H)   08/22/2019 8.1 (H)     LDL Cholesterol Calculated (mg/dL)   Date Value   10/12/2020 66   08/22/2019 74         How many servings of fruits and vegetables do you eat daily?  2-3    On average, how many sweetened beverages do you drink each day (Examples: soda, juice, sweet tea, etc.  Do NOT count diet or  "artificially sweetened beverages)?   0    How many days per week do you exercise enough to make your heart beat faster? 3-4 days    How many minutes a day do you exercise enough to make your heart beat faster? 20 - 29    How many days per week do you miss taking your medication? 0        Review of Systems   Constitutional, HEENT, cardiovascular, pulmonary, gi and gu systems are negative, except as otherwise noted.      Objective    BP (!) 146/80   Pulse 80   Temp 98.2  F (36.8  C) (Tympanic)   Resp 16   Ht 1.626 m (5' 4\")   Wt 82.2 kg (181 lb 2 oz)   BMI 31.09 kg/m    Body mass index is 31.09 kg/m .  Physical Exam   GENERAL: healthy, alert and no distress  EYES: Eyes grossly normal to inspection, PERRL and conjunctivae and sclerae normal  HENT: ear canals and TM's normal, nose and mouth without ulcers or lesions  NECK: no adenopathy, no asymmetry, masses, or scars and thyroid normal to palpation  RESP: lungs clear to auscultation - no rales, rhonchi or wheezes  BREAST: normal without masses, tenderness or nipple discharge and no palpable axillary masses or adenopathy  CV: regular rate and rhythm, normal S1 S2, no S3 or S4, no murmur, click or rub, no peripheral edema and peripheral pulses strong  ABDOMEN: soft, nontender, no hepatosplenomegaly, no masses and bowel sounds normal  MS: no gross musculoskeletal defects noted, no edema  SKIN: no suspicious lesions or rashes  NEURO: Normal strength and tone, mentation intact and speech normal  PSYCH: mentation appears normal, affect normal/bright  Diabetic foot exam: normal DP and PT pulses, no trophic changes or ulcerative lesions and normal sensory exam    No results found for any visits on 10/13/20.      No results found for any visits on 10/13/20.  Recent Results (from the past 240 hour(s))   Basic metabolic panel    Collection Time: 10/12/20 10:30 AM   Result Value Ref Range    Sodium 135 133 - 144 mmol/L    Potassium 4.1 3.4 - 5.3 mmol/L    Chloride 102 94 - " "109 mmol/L    Carbon Dioxide 28 20 - 32 mmol/L    Anion Gap 5 3 - 14 mmol/L    Glucose 201 (H) 70 - 99 mg/dL    Urea Nitrogen 15 7 - 30 mg/dL    Creatinine 0.63 0.52 - 1.04 mg/dL    GFR Estimate 89 >60 mL/min/[1.73_m2]    GFR Estimate If Black >90 >60 mL/min/[1.73_m2]    Calcium 9.1 8.5 - 10.1 mg/dL   Lipid panel reflex to direct LDL Fasting    Collection Time: 10/12/20 10:30 AM   Result Value Ref Range    Cholesterol 177 <200 mg/dL    Triglycerides 155 (H) <150 mg/dL    HDL Cholesterol 80 >49 mg/dL    LDL Cholesterol Calculated 66 <100 mg/dL    Non HDL Cholesterol 97 <130 mg/dL   Hemoglobin A1c    Collection Time: 10/12/20 10:30 AM   Result Value Ref Range    Hemoglobin A1C 8.3 (H) 0 - 5.6 %       Assessment & Plan     Type 2 diabetes, HbA1c goal < 7% (H)  Will add amaryl 1 mg   - FOOT EXAM  - glimepiride (AMARYL) 1 MG tablet; Take 1 tablet (1 mg) by mouth every morning (before breakfast)    Hyperlipidemia LDL goal <100  Well controlled    Benign essential hypertension  Needs better control will increase to 100 mg   - losartan (COZAAR) 100 MG tablet; Take 1 tablet (100 mg) by mouth daily     BMI:   Estimated body mass index is 31.09 kg/m  as calculated from the following:    Height as of this encounter: 1.626 m (5' 4\").    Weight as of this encounter: 82.2 kg (181 lb 2 oz).   Weight management plan: Discussed healthy diet and exercise guidelines         Patient Instructions   Increase thelosartan to 100mg I sent that in   Start the amaryl 1 mg if you are getting any blood sugar below 100 please let me know      Return in about 3 months (around 1/13/2021) for Lab Work, BP Recheck, med check.    Dorota Art MD  [facility]      "

## 2020-10-13 NOTE — PATIENT INSTRUCTIONS
Increase thelosartan to 100mg I sent that in   Start the amaryl 1 mg if you are getting any blood sugar below 100 please let me know

## 2020-11-22 ENCOUNTER — HEALTH MAINTENANCE LETTER (OUTPATIENT)
Age: 75
End: 2020-11-22

## 2020-12-01 ENCOUNTER — TRANSFERRED RECORDS (OUTPATIENT)
Dept: MULTI SPECIALTY CLINIC | Facility: CLINIC | Age: 75
End: 2020-12-01

## 2020-12-01 LAB — RETINOPATHY: NORMAL

## 2020-12-26 DIAGNOSIS — E11.69 TYPE 2 DIABETES MELLITUS WITH HYPERLIPIDEMIA (H): ICD-10-CM

## 2020-12-26 DIAGNOSIS — E78.5 HYPERLIPIDEMIA LDL GOAL <100: ICD-10-CM

## 2020-12-26 DIAGNOSIS — E78.5 TYPE 2 DIABETES MELLITUS WITH HYPERLIPIDEMIA (H): ICD-10-CM

## 2020-12-26 RX ORDER — METFORMIN HCL 500 MG
1000 TABLET, EXTENDED RELEASE 24 HR ORAL 2 TIMES DAILY WITH MEALS
Qty: 360 TABLET | Refills: 0 | Status: SHIPPED | OUTPATIENT
Start: 2020-12-26 | End: 2021-02-04

## 2020-12-26 RX ORDER — PRAVASTATIN SODIUM 40 MG
40 TABLET ORAL DAILY
Qty: 90 TABLET | Refills: 0 | Status: SHIPPED | OUTPATIENT
Start: 2020-12-26 | End: 2021-02-04

## 2021-01-19 ENCOUNTER — MYC MEDICAL ADVICE (OUTPATIENT)
Dept: FAMILY MEDICINE | Facility: CLINIC | Age: 76
End: 2021-01-19

## 2021-02-04 ENCOUNTER — OFFICE VISIT (OUTPATIENT)
Dept: FAMILY MEDICINE | Facility: CLINIC | Age: 76
End: 2021-02-04
Payer: MEDICARE

## 2021-02-04 VITALS
BODY MASS INDEX: 31.24 KG/M2 | DIASTOLIC BLOOD PRESSURE: 88 MMHG | WEIGHT: 183 LBS | TEMPERATURE: 98.5 F | SYSTOLIC BLOOD PRESSURE: 167 MMHG | HEART RATE: 76 BPM | HEIGHT: 64 IN | OXYGEN SATURATION: 99 %

## 2021-02-04 DIAGNOSIS — E11.9 TYPE 2 DIABETES, HBA1C GOAL < 7% (H): ICD-10-CM

## 2021-02-04 DIAGNOSIS — E11.69 TYPE 2 DIABETES MELLITUS WITH HYPERLIPIDEMIA (H): ICD-10-CM

## 2021-02-04 DIAGNOSIS — E78.5 HYPERLIPIDEMIA LDL GOAL <100: ICD-10-CM

## 2021-02-04 DIAGNOSIS — E78.5 TYPE 2 DIABETES MELLITUS WITH HYPERLIPIDEMIA (H): ICD-10-CM

## 2021-02-04 DIAGNOSIS — I10 BENIGN ESSENTIAL HYPERTENSION: Primary | ICD-10-CM

## 2021-02-04 LAB
CREAT UR-MCNC: 106 MG/DL
HBA1C MFR BLD: 7.3 % (ref 0–5.6)
MICROALBUMIN UR-MCNC: 25 MG/L
MICROALBUMIN/CREAT UR: 23.49 MG/G CR (ref 0–25)

## 2021-02-04 PROCEDURE — 83036 HEMOGLOBIN GLYCOSYLATED A1C: CPT | Performed by: FAMILY MEDICINE

## 2021-02-04 PROCEDURE — 99214 OFFICE O/P EST MOD 30 MIN: CPT | Performed by: FAMILY MEDICINE

## 2021-02-04 PROCEDURE — 36415 COLL VENOUS BLD VENIPUNCTURE: CPT | Performed by: FAMILY MEDICINE

## 2021-02-04 PROCEDURE — 82043 UR ALBUMIN QUANTITATIVE: CPT | Performed by: FAMILY MEDICINE

## 2021-02-04 RX ORDER — METFORMIN HCL 500 MG
1000 TABLET, EXTENDED RELEASE 24 HR ORAL 2 TIMES DAILY WITH MEALS
Qty: 360 TABLET | Refills: 2 | Status: SHIPPED | OUTPATIENT
Start: 2021-02-04 | End: 2021-10-26

## 2021-02-04 RX ORDER — PRAVASTATIN SODIUM 40 MG
40 TABLET ORAL DAILY
Qty: 90 TABLET | Refills: 3 | Status: SHIPPED | OUTPATIENT
Start: 2021-02-04 | End: 2021-10-26

## 2021-02-04 ASSESSMENT — MIFFLIN-ST. JEOR: SCORE: 1315.08

## 2021-02-04 NOTE — PATIENT INSTRUCTIONS
We are working hard to begin vaccinating more people against COVID-19. Currently, we are only vaccinating individuals age 75 and older and Phase 1a workers - healthcare workers who are unable to do their job remotely. Vaccine availability is very limited.    If you are 75 or older, or a healthcare worker who is unable to do your job remotely, please log in to [employer] using this link to see if we have an open appointment and schedule an appointment.  If there are no appointments left, you will be unable to schedule and need to check back later.  If you are a healthcare worker, you will be asked to provide proof of employment at your appointment. If you cannot, you will be turned away.    Vaccine appointments are being added as they become available. Please check your [employer] [facility] account frequently for availability. If you have technical difficulty using [employer], call [phone] for assistance.    You can learn more about the [facility]'s phased approach to administering the vaccine, with details on each phase, [email or web address].      Aa vaccine supply increases and we are able to open appointments to more groups, we will share that information on our website [email or web address]. Check this website to stay up to date on COVID-19 vaccination information.      Please send in blood pressure in the next few weeks

## 2021-02-04 NOTE — PROGRESS NOTES
Assessment & Plan     Type 2 diabetes, HbA1c goal < 7% (H)  Improved control   - Albumin Random Urine Quantitative with Creat Ratio  - Hemoglobin A1c    Type 2 diabetes mellitus with hyperlipidemia (H)  Doing better  - metFORMIN (GLUCOPHAGE-XR) 500 MG 24 hr tablet; Take 2 tablets (1,000 mg) by mouth 2 times daily (with meals)    Hyperlipidemia LDL goal <100    - pravastatin (PRAVACHOL) 40 MG tablet; Take 1 tablet (40 mg) by mouth daily      4. Benign essential hypertension  She will take some blood pressures at home over the next 2 to 3 weeks and she can send those in via [employer] at that point we can decide whether medication needs to be adjusted or not her home readings are low enough that it is likely she does not need alteration in her treatment plan.  It was snowing this morning and little stressful on the drive so we will just see what her home blood pressures are.    Review of the result(s) of each unique test - hgba1c, lipids, albumin pending                See Patient Instructions    Return in about 4 weeks (around 3/4/2021) for BP Recheck.    Dorota Art MD  [facility] ELIDA Mann is a 76 year old who presents to clinic today for the following health issues     HPI       Diabetes Follow-up  How often are you checking your blood sugar? Two times daily mornings 109, 121, afternoons 140, 145  Blood sugar testing frequency justification:  Adjustment of medication(s)  What time of day are you checking your blood sugars (select all that apply)?  Before and after meals  Have you had any blood sugars above 200?  No  Have you had any blood sugars below 70?  No    What symptoms do you notice when your blood sugar is low?  None    What concerns do you have today about your diabetes? None     Do you have any of these symptoms? (Select all that apply)  No numbness or tingling in feet.  No redness, sores or blisters on feet.  No complaints of excessive thirst.  No reports of blurry  "vision.  No significant changes to weight.    Have you had a diabetic eye exam in the last 12 months? Yes- Date of last eye exam:  ,  Location:    December 2020 Dr. [facility]        Hyperlipidemia Follow-Up    Are you regularly taking any medication or supplement to lower your cholesterol?   Yes-      Are you having muscle aches or other side effects that you think could be caused by your cholesterol lowering medication?  No    Hypertension Follow-up    Do you check your blood pressure regularly outside of the clinic? Yes  Monday 144/90 after few minutes 137/71    Are you following a low salt diet? Yes    Are your blood pressures ever more than 140 on the top number (systolic) OR more   than 90 on the bottom number (diastolic), for example 140/90? Yes    BP Readings from Last 2 Encounters:   02/04/21 (!) 167/88   10/13/20 (!) 146/80     Hemoglobin A1C (%)   Date Value   02/04/2021 7.3 (H)   10/12/2020 8.3 (H)     LDL Cholesterol Calculated (mg/dL)   Date Value   10/12/2020 66   08/22/2019 74               Review of Systems   Constitutional, HEENT, cardiovascular, pulmonary, GI, , musculoskeletal, neuro, skin, endocrine and psych systems are negative, except as otherwise noted.      Objective    BP (!) 167/88   Pulse 76   Temp 98.5  F (36.9  C) (Tympanic)   Ht 1.626 m (5' 4\")   Wt 83 kg (183 lb)   SpO2 99%   BMI 31.41 kg/m    Body mass index is 31.41 kg/m .  Physical Exam   GENERAL: healthy, alert and no distress  NECK: no adenopathy, no asymmetry, masses, or scars and thyroid normal to palpation  CV: regular rate and rhythm, normal S1 S2, no S3 or S4, no murmur, click or rub, no peripheral edema and peripheral pulses strong  MS: no gross musculoskeletal defects noted, no edema    Results for orders placed or performed in visit on 02/04/21 (from the past 24 hour(s))   Hemoglobin A1c   Result Value Ref Range    Hemoglobin A1C 7.3 (H) 0 - 5.6 %   albumin pending   Lipids from 10/20 were fine     Dorota " AMANDA Art M.D.

## 2021-02-24 ENCOUNTER — MYC MEDICAL ADVICE (OUTPATIENT)
Dept: FAMILY MEDICINE | Facility: CLINIC | Age: 76
End: 2021-02-24

## 2021-02-24 DIAGNOSIS — I10 BENIGN ESSENTIAL HYPERTENSION: Primary | ICD-10-CM

## 2021-02-24 RX ORDER — HYDROCHLOROTHIAZIDE 12.5 MG/1
12.5 TABLET ORAL DAILY
Qty: 90 TABLET | Refills: 3 | Status: SHIPPED | OUTPATIENT
Start: 2021-02-24 | End: 2021-10-26

## 2021-04-29 DIAGNOSIS — E11.9 TYPE 2 DIABETES, HBA1C GOAL < 7% (H): ICD-10-CM

## 2021-04-29 RX ORDER — BLOOD SUGAR DIAGNOSTIC
STRIP MISCELLANEOUS
Qty: 100 STRIP | Refills: 4 | Status: SHIPPED | OUTPATIENT
Start: 2021-04-29 | End: 2022-10-04

## 2021-04-29 NOTE — TELEPHONE ENCOUNTER
Prescription approved per [facility] Refill Protocol.  Apolonia Leyva RN

## 2021-06-03 ENCOUNTER — APPOINTMENT (OUTPATIENT)
Dept: CT IMAGING | Facility: CLINIC | Age: 76
End: 2021-06-03
Attending: EMERGENCY MEDICINE
Payer: MEDICARE

## 2021-06-03 ENCOUNTER — APPOINTMENT (OUTPATIENT)
Dept: GENERAL RADIOLOGY | Facility: CLINIC | Age: 76
End: 2021-06-03
Attending: EMERGENCY MEDICINE
Payer: MEDICARE

## 2021-06-03 ENCOUNTER — HOSPITAL ENCOUNTER (EMERGENCY)
Facility: CLINIC | Age: 76
Discharge: HOME | End: 2021-06-03
Attending: EMERGENCY MEDICINE | Admitting: EMERGENCY MEDICINE
Payer: MEDICARE

## 2021-06-03 VITALS
DIASTOLIC BLOOD PRESSURE: 80 MMHG | SYSTOLIC BLOOD PRESSURE: 169 MMHG | HEART RATE: 71 BPM | RESPIRATION RATE: 16 BRPM | WEIGHT: 184 LBS | OXYGEN SATURATION: 96 % | BODY MASS INDEX: 31.58 KG/M2 | TEMPERATURE: 97.8 F

## 2021-06-03 DIAGNOSIS — S60.512A ABRASION OF LEFT HAND, INITIAL ENCOUNTER: ICD-10-CM
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
## 2021-06-03 DIAGNOSIS — S63.502A WRIST SPRAIN, LEFT, INITIAL ENCOUNTER: ICD-10-CM

## 2021-06-03 DIAGNOSIS — S09.90XA CLOSED HEAD INJURY WITHOUT LOSS OF CONSCIOUSNESS, INITIAL ENCOUNTER: ICD-10-CM

## 2021-06-03 DIAGNOSIS — S60.222A CONTUSION OF LEFT HAND, INITIAL ENCOUNTER: ICD-10-CM

## 2021-06-03 PROCEDURE — 73130 X-RAY EXAM OF HAND: CPT | Mod: LT

## 2021-06-03 PROCEDURE — 70450 CT HEAD/BRAIN W/O DYE: CPT | Mod: ME

## 2021-06-03 PROCEDURE — 90715 TDAP VACCINE 7 YRS/> IM: CPT | Performed by: EMERGENCY MEDICINE

## 2021-06-03 PROCEDURE — 73110 X-RAY EXAM OF WRIST: CPT | Mod: LT

## 2021-06-03 PROCEDURE — 250N000013 HC RX MED GY IP 250 OP 250 PS 637: Performed by: EMERGENCY MEDICINE

## 2021-06-03 PROCEDURE — 70486 CT MAXILLOFACIAL W/O DYE: CPT | Mod: MG

## 2021-06-03 PROCEDURE — 90471 IMMUNIZATION ADMIN: CPT | Performed by: EMERGENCY MEDICINE

## 2021-06-03 PROCEDURE — 99285 EMERGENCY DEPT VISIT HI MDM: CPT | Mod: 25 | Performed by: EMERGENCY MEDICINE

## 2021-06-03 PROCEDURE — 99284 EMERGENCY DEPT VISIT MOD MDM: CPT | Performed by: EMERGENCY MEDICINE

## 2021-06-03 PROCEDURE — 250N000011 HC RX IP 250 OP 636: Performed by: EMERGENCY MEDICINE

## 2021-06-03 RX ORDER — HYDROCODONE BITARTRATE AND ACETAMINOPHEN 5; 325 MG/1; MG/1
2 TABLET ORAL ONCE
Status: COMPLETED | OUTPATIENT
Start: 2021-06-03 | End: 2021-06-03

## 2021-06-03 RX ADMIN — CLOSTRIDIUM TETANI TOXOID ANTIGEN (FORMALDEHYDE INACTIVATED), CORYNEBACTERIUM DIPHTHERIAE TOXOID ANTIGEN (FORMALDEHYDE INACTIVATED), BORDETELLA PERTUSSIS TOXOID ANTIGEN (GLUTARALDEHYDE INACTIVATED), BORDETELLA PERTUSSIS FILAMENTOUS HEMAGGLUTININ ANTIGEN (FORMALDEHYDE INACTIVATED), BORDETELLA PERTUSSIS PERTACTIN ANTIGEN, AND BORDETELLA PERTUSSIS FIMBRIAE 2/3 ANTIGEN 0.5 ML: 5; 2; 2.5; 5; 3; 5 INJECTION, SUSPENSION INTRAMUSCULAR at 11:46

## 2021-06-03 RX ADMIN — HYDROCODONE BITARTRATE AND ACETAMINOPHEN 2 TABLET: 5; 325 TABLET ORAL at 11:45

## 2021-06-03 NOTE — ED PROVIDER NOTES
History     Chief Complaint   Patient presents with     Fall     teip and fall, skinned hand L hand, hit L side of head, no LOC, not taking blood thinners.     Head Injury     History per patient, her  witnessed the fall, and review of EMR    HPI  Mackenzie Trinidad is a 76 year old female who presents emergency department for evaluation of head injury and left hand injury after fall on the sidewalk when she caught her foot on a raised section of pavement causing her to lose her balance and fall forward onto the outstretched hand, and striking her left periorbital head without LOC.  She has a mild occipital and superior headache, but no nausea or vomiting.  No visual or neurologic deficit or abnormality.  No nasal or oral bleeding.  No drainage from the ears or nose.  No neck or back pain or injury.  She has dorsal hand pain in the ulnar aspect of the hand and abrasion of the palm and dorsal wrist pain.  No hand CMS dysfunction.  No other injury or trauma. No visual or neuro abnormality or deficit. She takes a baby aspirin daily but no other anticoagulation therapy.  She is unsure of her tetanus immunization status.    Previous Records Reviewed:  Td/Tdap  12/27/2012          Allergies:  Allergies   Allergen Reactions     Nkda [No Known Drug Allergies]        Problem List:    Patient Active Problem List    Diagnosis Date Noted     Ventral hernia 06/23/2018     Priority: Medium     Benign essential hypertension 11/28/2016     Priority: Medium     Type 2 diabetes mellitus with hyperlipidemia (H) 10/25/2015     Priority: Medium     Hyperlipidemia LDL goal <100 01/23/2012     Priority: Medium     Type 2 diabetes, HbA1c goal < 7% (H) 01/23/2012     Priority: Medium     Diverticulosis of large intestine 10/10/2007     Priority: Medium     Problem list name updated by automated process. Provider to review       FAMILY HISTORY OF OSTEOPORSIS 04/20/2005     Priority: Medium     Mother taking meds  Problem list name  updated by automated process. Provider to review and confirm       FAMILY HISTORY OF COLON CANCER 2005     Priority: Medium     Colonoscopy done  and 10/07 (no polyps on last one, still with diverticuli)  NEEDS REPEAT IN 5 YEARS ()       MYOPIA 06/15/2001     Priority: Medium     PRESBYOPIA 06/15/2001     Priority: Medium        Past Medical History:    Past Medical History:   Diagnosis Date     Diabetes (H) 2013     Need for prophylactic hormone replacement therapy (postmenopausal)      PONV (postoperative nausea and vomiting)        Past Surgical History:    Past Surgical History:   Procedure Laterality Date     ARTHRODESIS FOOT Right 10/23/2014    Procedure: ARTHRODESIS FOOT;  Surgeon: Mauri Gomez DPM;  Location: [facility] OR     ARTHRODESIS FOOT Right 10/3/2019    Procedure: RIGHT 2ND METATARSAL PHALANGEAL JOINT REPAIR OF CAPSULE/STABILIZATION, 3RD TOE PROXIMAL INTERPHALANGEAL JOINT FUSION; 4TH TOE CORRECTION;  Surgeon: Mauri Gomez DPM;  Location: [facility] OR     COLONOSCOPY  10/10/2007    Repeat in 5 years     COLONOSCOPY N/A 2018    Procedure: COLONOSCOPY;  Surgeon: Magno Huber MD;  Location: [facility]     LAPAROSCOPIC HERNIORRHAPHY VENTRAL N/A 2018    Procedure: LAPAROSCOPIC HERNIORRHAPHY VENTRAL;  LAPAROSCOPIC HERNIORRHAPHY VENTRAL ;  Surgeon: Jose Ibrahim MD;  Location:  OR     SURGICAL HISTORY OF -       Hysterectomy     SURGICAL HISTORY OF -   3/2002    Right hammer toe/bunionectomy       Family History:    Family History   Problem Relation Age of Onset     Hypertension Mother      Osteoporosis Mother      Heart Disease Mother 94        small heart attack     Cancer Father         bone cancer     Hypertension Brother      Lipids Sister      Diabetes Sister         diet controlled     Cancer - colorectal Other 45        he  at age 45 from colon cancer     Hypertension Brother      Diabetes Brother      Lipids Brother      Cancer - colorectal Brother         in  his 60s. tested negative genetic markers     Lipids Brother      Hypertension Sister      Asthma No family hx of      C.A.D. No family hx of      Cerebrovascular Disease No family hx of      Breast Cancer No family hx of      Prostate Cancer No family hx of        Social History:  Marital Status:   [2]  Social History     Tobacco Use     Smoking status: Former Smoker     Packs/day: 0.00     Years: 0.00     Pack years: 0.00     Quit date: 1971     Years since quittin.4     Smokeless tobacco: Never Used   Substance Use Topics     Alcohol use: Yes     Comment: social     Drug use: No        Medications:    ACCU-CHEK JULIO CÉSAR PLUS test strip  aspirin 81 MG tablet  CALCIUM 500 + D OR  FLAX SEED OIL OR  FREESTYLE LANCETS MISC  glimepiride (AMARYL) 1 MG tablet  hydrochlorothiazide (HYDRODIURIL) 12.5 MG tablet  losartan (COZAAR) 100 MG tablet  metFORMIN (GLUCOPHAGE-XR) 500 MG 24 hr tablet  pravastatin (PRAVACHOL) 40 MG tablet        Review of Systems  As mentioned above in the history present illness.  All other systems were reviewed and are negative.    Physical Exam   BP: (!) 163/98  Pulse: 70  Temp: 97.8  F (36.6  C)  Resp: 16  Weight: 83.5 kg (184 lb)  SpO2: 99 %      Physical Exam  Vitals signs and nursing note reviewed.   Constitutional:       General: She is not in acute distress.     Appearance: Normal appearance. She is well-developed. She is not ill-appearing or diaphoretic.   HENT:      Head: Normocephalic. Contusion present. No raccoon eyes or Amezcua's sign.      Jaw: There is normal jaw occlusion.        Right Ear: Tympanic membrane, ear canal and external ear normal. No drainage. No hemotympanum.      Left Ear: Tympanic membrane, ear canal and external ear normal. No drainage. No hemotympanum.      Nose: Nose normal.      Right Nostril: No epistaxis or septal hematoma.      Left Nostril: No epistaxis or septal hematoma.      Mouth/Throat:      Mouth: Mucous membranes are moist.   Eyes:       General: Vision grossly intact. Gaze aligned appropriately. No scleral icterus.     Extraocular Movements: Extraocular movements intact.      Conjunctiva/sclera: Conjunctivae normal.   Neck:      Musculoskeletal: Full passive range of motion without pain, normal range of motion and neck supple. Normal range of motion. No pain with movement, spinous process tenderness or muscular tenderness.      Trachea: No tracheal deviation.   Cardiovascular:      Rate and Rhythm: Normal rate and regular rhythm.      Heart sounds: Normal heart sounds. No murmur. No friction rub. No gallop.    Pulmonary:      Effort: Pulmonary effort is normal. No respiratory distress.      Breath sounds: Normal breath sounds. No wheezing, rhonchi or rales.   Abdominal:      General: There is no distension.      Palpations: Abdomen is soft.      Tenderness: There is no abdominal tenderness.   Musculoskeletal: Normal range of motion.         General: No swelling or deformity.      Left wrist: She exhibits tenderness. She exhibits normal range of motion, no swelling, no effusion, no crepitus and no deformity.        Arms:       Left hand: She exhibits tenderness. She exhibits normal range of motion and no deformity. Normal sensation noted. Normal strength noted.        Hands:       Right lower leg: No edema.      Left lower leg: No edema.   Skin:     General: Skin is warm and dry.      Coloration: Skin is not pale.      Findings: No erythema or rash.   Neurological:      General: No focal deficit present.      Mental Status: She is alert and oriented to person, place, and time.      GCS: GCS eye subscore is 4. GCS verbal subscore is 5. GCS motor subscore is 6.      Cranial Nerves: Cranial nerves are intact. No cranial nerve deficit ( 2-12 intact).      Sensory: Sensation is intact.      Motor: Motor function is intact. No weakness.      Coordination: Coordination is intact. Coordination normal.      Gait: Gait is intact.   Psychiatric:         Mood  and Affect: Mood normal.         Behavior: Behavior normal.         ED Course        Procedures               Results for orders placed or performed during the hospital encounter of 06/03/21   CT Head w/o Contrast     Status: None    Narrative    CT SCAN OF THE HEAD WITHOUT CONTRAST Meron 3, 2021 12:22 PM     HISTORY: Head trauma, minor (Age >= 65y).    TECHNIQUE: Axial images of the head and coronal reformations without  IV contrast material. Radiation dose for this scan was reduced using  automated exposure control, adjustment of the mA and/or kV according  to patient size, or iterative reconstruction technique.    COMPARISON: CT head 8/15/2014.    FINDINGS: There is no evidence of intracranial hemorrhage, mass, acute  infarct or anomaly. The ventricles are normal in size and  configuration. Mild generalized brain parenchymal volume loss.  Increased mild-to-moderate patchy and confluent hypoattenuation in the  cerebral white matter, nonspecific, but most likely due to chronic  small vessel ischemic disease.    The visualized portions of the sinuses and mastoids appear normal. The  bony calvarium and bones of the skull base appear intact.       Impression    IMPRESSION:     1. No evidence of acute intracranial hemorrhage, mass, or herniation.   2. Brain atrophy and presumed chronic small vessel ischemic disease,  as described.      TAD ORTIZ MD   XR Hand Left G/E 3 Views     Status: None    Narrative    WRIST LEFT THREE OR MORE VIEWS, LEFT HAND THREE OR MORE  6/3/2021 1:06  PM     HISTORY:  Trauma, pain.    FINDINGS: Fairly prominent degenerative arthrosis at the thumb CMC  joint. There is a tiny, old, ununited ulnar styloid fracture.      Impression    IMPRESSION: No acute fracture identified.    JANESSA ALONZO MD   XR Wrist Left G/E 3 Views     Status: None    Narrative    WRIST LEFT THREE OR MORE VIEWS, LEFT HAND THREE OR MORE  6/3/2021 1:06  PM     HISTORY:  Trauma, pain.    FINDINGS: Fairly prominent  degenerative arthrosis at the thumb CMC  joint. There is a tiny, old, ununited ulnar styloid fracture.      Impression    IMPRESSION: No acute fracture identified.    JANESSA ALONZO MD   CT Facial Bones without Contrast     Status: None    Narrative    CT SCAN OF THE FACE WITHOUT CONTRAST 6/3/2021 12:25 PM     HISTORY: Facial trauma. Closed head injury with left periorbital  trauma and pain.     TECHNIQUE: Axial CT images of the facial bones were completed with  sagittal and coronal reformations. Radiation dose for this scan was  reduced using automated exposure control, adjustment of the mA and/or  kV according to patient size, or iterative reconstruction technique.     COMPARISON: CT head of same date.     FINDINGS:  The pterygoid plates are intact. The zygomatic arches,  sphenotemporal buttresses, the walls of both orbits, and the walls of  the maxillary sinuses appear intact. No displaced nasal arch or nasal  septal fracture appreciated. The anterior skull base is intact. The  mandible is intact. The temporomandibular joints are normally located.    Mild left periorbital soft tissue swelling laterally. The intraorbital  soft tissue structures appear normal. Incidental note made of left  more than right olivia bullosa of the middle turbinates. Mild  scattered polypoid mucosal thickening in the paranasal sinuses without  air-fluid levels. The mastoid and middle ear cavities are clear.  Please see separate report from head CT of same date for details  regarding intracranial findings. Small calcified bilateral palatine  tonsilliths are present. Partially visualized multilevel degenerative  changes in the upper to mid cervical spine, including advanced  degenerative facet arthropathy at multiple levels. The left C3-C4  facet joint appears fused on a degenerative basis. There is mild  presumed degenerative anterolisthesis of C4 on C5.      Impression    IMPRESSION: No acute maxillofacial fracture. Left  periorbital  superficial soft tissue contusion.    TAD ORTIZ MD       Medications   HYDROcodone-acetaminophen (NORCO) 5-325 MG per tablet 2 tablet (2 tablets Oral Given 6/3/21 1145)   Tdap (tetanus-diphtheria-acell pertussis) (ADACEL) injection 0.5 mL (0.5 mLs Intramuscular Given 6/3/21 1146)       Assessments & Plan (with Medical Decision Making)   Fall with CHI w/o LOC, left hand contusion and abrasion and left wrist sprain. CT and plain film evals negative. Left wrist was splinted in a preformed wrist splint. She and her  were counseled on risk of occult fracture and were provided instructions for supportive care and will return as needed for worsened condition or worsening symptoms, or new problems or concerns. Clinic recheck in ~ 1 week if symptoms not resolved.      I have reviewed the nursing notes.    I have reviewed the findings, diagnosis, plan and need for follow up with the patient and her .    Discharge Medication List as of 6/3/2021  1:54 PM   Ibuprofen 400 mg po q 6 hrs prn (OTC)         Final diagnoses:   Fall, initial encounter   Closed head injury without loss of consciousness, initial encounter   Contusion of left hand, initial encounter   Abrasion of left hand, initial encounter   Wrist sprain, left, initial encounter       6/3/2021   [facility] EMERGENCY DEPT     Bebo Hernandez MD  06/04/21 [id number]

## 2021-06-04 ASSESSMENT — VISUAL ACUITY: OU: 1

## 2021-06-09 ENCOUNTER — TELEPHONE (OUTPATIENT)
Dept: FAMILY MEDICINE | Facility: CLINIC | Age: 76
End: 2021-06-09

## 2021-06-09 NOTE — TELEPHONE ENCOUNTER
Call placed to patient  Appointment scheduled with Dr. Cruz for 6/10/2021 at 1430 for ED follow-up    No further questions/concerns    Nahid Clark RN

## 2021-06-09 NOTE — TELEPHONE ENCOUNTER
Reason for Call:   Appointment, Requested Provider:  Any Provider    PCP: Dorota Art    Reason for visit: Follow up from ED Fall    Duration of symptoms: Pt was seen in ED [place] 6/3 for a fall with injury to her left hand and head. She would like to be seen this week in [place].     Can we leave a detailed message on this number? YES    Phone number patient can be reached at: Cell number on file:    Telephone Information:   Mobile [phone]       Best Time: anytime    Call taken on 6/9/2021 at 9:06 AM by Lena Ferguson

## 2021-06-10 ENCOUNTER — ANCILLARY PROCEDURE (OUTPATIENT)
Dept: GENERAL RADIOLOGY | Facility: CLINIC | Age: 76
End: 2021-06-10
Attending: FAMILY MEDICINE
Payer: MEDICARE

## 2021-06-10 ENCOUNTER — OFFICE VISIT (OUTPATIENT)
Dept: FAMILY MEDICINE | Facility: CLINIC | Age: 76
End: 2021-06-10
Payer: MEDICARE

## 2021-06-10 VITALS
HEART RATE: 80 BPM | DIASTOLIC BLOOD PRESSURE: 62 MMHG | SYSTOLIC BLOOD PRESSURE: 164 MMHG | TEMPERATURE: 98.3 F | BODY MASS INDEX: 32.34 KG/M2 | OXYGEN SATURATION: 98 % | WEIGHT: 188.4 LBS

## 2021-06-10 DIAGNOSIS — S06.0X0D CONCUSSION WITHOUT LOSS OF CONSCIOUSNESS, SUBSEQUENT ENCOUNTER: ICD-10-CM

## 2021-06-10 DIAGNOSIS — M79.642 PAIN OF LEFT HAND: ICD-10-CM

## 2021-06-10 DIAGNOSIS — M79.642 PAIN OF LEFT HAND: Primary | ICD-10-CM

## 2021-06-10 DIAGNOSIS — I10 BENIGN ESSENTIAL HYPERTENSION: ICD-10-CM

## 2021-06-10 PROCEDURE — 73130 X-RAY EXAM OF HAND: CPT | Mod: LT | Performed by: RADIOLOGY

## 2021-06-10 PROCEDURE — 99214 OFFICE O/P EST MOD 30 MIN: CPT | Performed by: FAMILY MEDICINE

## 2021-06-10 NOTE — NURSING NOTE
"Initial BP (!) 164/62   Pulse 80   Temp 98.3  F (36.8  C) (Tympanic)   Wt 85.5 kg (188 lb 6.4 oz)   SpO2 98%   Breastfeeding No   BMI 32.34 kg/m   Estimated body mass index is 32.34 kg/m  as calculated from the following:    Height as of 2/4/21: 1.626 m (5' 4\").    Weight as of this encounter: 85.5 kg (188 lb 6.4 oz). .      "

## 2021-06-10 NOTE — PATIENT INSTRUCTIONS
The headache might last a few weeks but should be gradually improving.  If it becomes more intense or worsens please seek emergent care.    The ibuprofen is okay to take but can be hard on the stomach and kidneys.  You might consider alternating with acetaminophen for pain.  The maximum dose of acetaminophen in 24 hours is 3000mg.  The maximum dose of ibuprofen in 24 hours is 2400mg.  Please be sure you are staying within those dosing guidelines.    Dr Henderson is going to be the most help for your hand.  We'll see what he says at your visit tomorrow.     Please bring your home blood pressure cuff to clinic for a visit with one of the nurses so we can make sure the readings you are getting at home are accurate.

## 2021-06-10 NOTE — PROGRESS NOTES
A/P:      ICD-10-CM    1. Pain of left hand  M79.642 XR Hand Left G/E 3 Views   2. Concussion without loss of consciousness, subsequent encounter  S06.0X0D    3. Benign essential hypertension  I10      Patient Instructions   The headache might last a few weeks but should be gradually improving.  If it becomes more intense or worsens please seek emergent care.    The ibuprofen is okay to take but can be hard on the stomach and kidneys.  You might consider alternating with acetaminophen for pain.  The maximum dose of acetaminophen in 24 hours is 3000mg.  The maximum dose of ibuprofen in 24 hours is 2400mg.  Please be sure you are staying within those dosing guidelines.    Dr Henderson is going to be the most help for your hand.  We'll see what he says at your visit tomorrow.     Please bring your home blood pressure cuff to clinic for a visit with one of the nurses so we can make sure the readings you are getting at home are accurate.    Elevated BP today, pt repots normal readings at home.  Plan to bring cuff into clinic for check.    Melinda Mann is a 76 year old who presents for the following health issues     HPI     ED/UC Followup:    Facility:  [facility] ED  Date of visit: 6/3/21  Reason for visit: Fall  Current Status: still has headaches and left hand pain and swelling     Still having some headache, feels about the same as it did following her fall, no worsening.   Has a dull ache on the top of her head.  Improved with ibuprofen.  No real aggravating factors noted.  Does not wake her at night, able to sleep well.  Can be present when she wakes.    Taking advil 600mg every 4 hours during the day.   Feels she is about 4 doses per day.  Not taking any acetaminophen    Finds that it takes her HA away completely as long as she takes it regularly    At first found the brace helpful for her hand/wrist pain, stopped wearing it after a few days.  Pain in the lateral left hand, swelling improving.     Icing a few times per day which helps.      * Checks BP at home sometimes, usually good.  130's/70    Review of Systems   Constitutional, HEENT, cardiovascular, pulmonary, gi and gu systems are negative, except as otherwise noted.      Objective    BP (!) 164/62   Pulse 80   Temp 98.3  F (36.8  C) (Tympanic)   Wt 85.5 kg (188 lb 6.4 oz)   SpO2 98%   Breastfeeding No   BMI 32.34 kg/m    Body mass index is 32.34 kg/m .  Physical Exam   PE:  VS as above   Gen:  WN/WD/WH female in NAD   Neuro:  A&O x 3, normal mentation and gait   MSK:  L hand with swelling and bruising over 4th and 5th metacarpals.  Fingers nontender.  Wrist nontender.  Strength intact in flexion and extension of fingers.  Able to make a fist.  Cap refill <3 second.    Epic reviewed.    X-ray L hand without acute fracture per my visualization

## 2021-06-11 ENCOUNTER — ANCILLARY PROCEDURE (OUTPATIENT)
Dept: GENERAL RADIOLOGY | Facility: CLINIC | Age: 76
End: 2021-06-11
Attending: FAMILY MEDICINE
Payer: MEDICARE

## 2021-06-11 ENCOUNTER — OFFICE VISIT (OUTPATIENT)
Dept: ORTHOPEDICS | Facility: CLINIC | Age: 76
End: 2021-06-11
Attending: EMERGENCY MEDICINE
Payer: MEDICARE

## 2021-06-11 VITALS
HEIGHT: 64 IN | DIASTOLIC BLOOD PRESSURE: 76 MMHG | WEIGHT: 188 LBS | BODY MASS INDEX: 32.1 KG/M2 | SYSTOLIC BLOOD PRESSURE: 142 MMHG

## 2021-06-11 DIAGNOSIS — S63.502A WRIST SPRAIN, LEFT, INITIAL ENCOUNTER: ICD-10-CM

## 2021-06-11 DIAGNOSIS — M65.331 TRIGGER MIDDLE FINGER OF RIGHT HAND: ICD-10-CM

## 2021-06-11 DIAGNOSIS — S60.222A CONTUSION OF LEFT HAND, INITIAL ENCOUNTER: ICD-10-CM

## 2021-06-11 DIAGNOSIS — M25.522 LEFT ELBOW PAIN: Primary | ICD-10-CM

## 2021-06-11 PROCEDURE — 99204 OFFICE O/P NEW MOD 45 MIN: CPT | Performed by: FAMILY MEDICINE

## 2021-06-11 PROCEDURE — 73080 X-RAY EXAM OF ELBOW: CPT | Mod: LT | Performed by: RADIOLOGY

## 2021-06-11 ASSESSMENT — MIFFLIN-ST. JEOR: SCORE: 1337.76

## 2021-06-11 NOTE — PROGRESS NOTES
ASSESSMENT & PLAN    Mackenzie was seen today for pain.    Diagnoses and all orders for this visit:    Left elbow pain  -     XR Elbow LT G/E 3 vw    Contusion of left hand, initial encounter  -     Orthopedic & Spine  Referral    Wrist sprain, left, initial encounter  -     Orthopedic & Spine  Referral    Trigger middle finger of right hand      This issue is acute and Worsening.    # Left Hand/Elbow Injury: Notable after a fall on 6/3/21 with left hand/wrist and elbow pain.  Repeat x-rays showing no fracture, mild pain over lateral elbow and 2,3, 5 metacarpal bones.  Likely wrist/elbow sprain after a fall.  Symptoms overall improving.  Given this plan to recommend home exercises including range of motion and and follow-up as needed.    # Right Trigger Finger 3rd digit: Notable over the past couple of years.  There is triggering of the third digit at the A1 pulley grade 1.  Given symptoms do not bother that much plan to treat as below and follow-up only as needed.  Can consider steroid injections if refractory.  Image Findings: neg elbow and wrist fractures  Treatment: night trigger finger brace   Medications/Injections: Limited tylenol  for pain for 1-2 weeks  Follow-up: In one month if symptoms do not improve, sooner if worsening    Please call [phone]   Ask for my team if you have any questions or concerns       Tate Henderson MD  [facility] [place]    -----  Chief Complaint   Patient presents with     Left Wrist - Pain       SUBJECTIVE  Mackenzie Trinidad is a/an 76 year old female who is seen in consultation at the request of  Bebo Hernandez M.D. for evaluation of left hand/wrist pain.     The patient is seen by themselves.  The patient is Right handed    Onset: 6/3/21, 8 day(s) ago. Patient describes injury as tripped over sidewalk-fell and hip hand and left side of head.  Presented to ED 6/3/21 and CT of head and face were performed along with xray of  "wrist and hand. Had follow up with PCP 6/10/21 and updated xrays of hand were performed.  Pt noting an ache going up to her elbow.  Pt reports bruising improving.  She does have HA but PCP following this with neg CT head.  Pain over 2nd and 4/5th metacarpals.  Reviewed previous notes and x-rays.   Location of Pain: left 4th and 5th metacarpals   Worsened by: increased movements   Better with: rest   Treatments tried:  ice, splint  Associated symptoms: bruising and swelling     Orthopedic/Surgical history: NO  Social History/Occupation: Retired     No family history pertinent to patient's problem today. No family history pertinent to the patient's problem today      REVIEW OF SYSTEMS:  Review of Systems   ROS: 10 point ROS neg other than the symptoms noted above in the HPI.      OBJECTIVE:  BP (!) 142/76   Ht 1.626 m (5' 4\")   Wt 85.3 kg (188 lb)   BMI 32.27 kg/m     General: healthy, alert and in no distress  HEENT: no scleral icterus or conjunctival erythema  Skin: no suspicious lesions or rash. No jaundice.  CV: distal perfusion intact    Resp: normal respiratory effort without conversational dyspnea   Psych: normal mood and affect  Gait: normal steady gait with appropriate coordination and balance    Neuro: Normal light sensory exam of left upper extremity     Hand/wrist (left):  No deformity noted.  No edema, ecchymosis.  Full range of motion in forearm with pronation and supination.  Full range of motion in wrist with flexion, extension, radial and ulnar deviation.  Full  and intrinsic strength.  Radial pulses normal, +2/4, capillary refill brisk.  Mild pain over 2,4,5 metacarpals  No tenderness to palpation over distal radius, ulna, carpal bones, fingers.  No tenderness thenar or hypothenar eminences.  No snuffbox tenderness.  Triggering of right 3rd digit    Intact and symmetric strength and sensation in the median/radial/unlar distributions bilaterally       RADIOLOGY:  I independently, visualized and " reviewed these images with the patient  CMC arthritis, no elbow fracture or arthritis   HAND LEFT THREE OR MORE VIEWS  6/10/2021 3:27 PM      HISTORY: Pain of left hand     COMPARISON: 6/3/2021 x-ray.                                                                      IMPRESSION: Advanced first carpometacarpal degenerative changes. Old  ulnar styloid tiny avulsion fracture. No evidence of acute fracture.  No significant change.     HODAN PITTS MD    WRIST LEFT THREE OR MORE VIEWS, LEFT HAND THREE OR MORE  6/3/2021 1:06  PM      HISTORY:  Trauma, pain.     FINDINGS: Fairly prominent degenerative arthrosis at the thumb CMC  joint. There is a tiny, old, ununited ulnar styloid fracture.                                                                      IMPRESSION: No acute fracture identified.     JANESSA ALONZO MD    XR ELBOW LEFT G/E 3 VIEWS 6/11/2021 2:27 PM      HISTORY: concern for radial head fracture; Left elbow pain                                                                      IMPRESSION: Negative exam.     SPENCER DAVIDSON MD

## 2021-06-11 NOTE — PATIENT INSTRUCTIONS
Mackenzie to follow up with Primary Care provider regarding elevated blood pressure.    # Left Hand/Elbow Injury: Notable after a fall on 6/3/21 with left hand/wrist and elbow pain.  Repeat x-rays showing no fracture, mild pain over lateral elbow and 2,3, 5 metacarpal bones.  Likely wrist/elbow sprain after a fall.  Symptoms overall improving.  Given this plan to recommend home exercises including range of motion and and follow-up as needed.    # Right Trigger Finger 3rd digit: Notable over the past couple of years.  There is triggering of the third digit at the A1 pulley grade 1.  Given symptoms do not bother that much plan to treat as below and follow-up only as needed.  Can consider steroid injections if refractory.  Image Findings: neg elbow and wrist fractures  Treatment: night trigger finger brace   Medications/Injections: Limited tylenol  for pain for 1-2 weeks  Follow-up: In one month if symptoms do not improve, sooner if worsening    Please call [phone]   Ask for my team if you have any questions or concerns    It was great seeing you today!    Tate Henderson MD, CAQSM       Patient Education     Treating Trigger Finger    Trigger finger occurs when the tissue inside your finger or thumb becomes inflamed. Mild cases can be treated without surgery. If the problem is severe, surgery may be needed. Your healthcare provider will talk with you about your options.  Nonsurgical treatment  For mild symptoms, your healthcare provider may have you rest the finger or thumb. You may also be told to take anti-inflammatory medicines. These include ibuprofen or aspirin. You may be given an injection of medicine in the base of the finger or thumb. This typically is a steroid, such as cortisone.  Surgery  If nonsurgical treatments don t ease your symptoms, you may need surgery. A tendon is a cordlike fiber that attaches muscle to bone and allows joints to bend. The tendon is surrounded by a protective cover called a  sheath. During surgery, the sheath in your finger or thumb is opened to enlarge the space and release the swollen tendon. This allows the finger or thumb to bend and straighten normally. Surgery takes about 20 minutes. It can often be done using a local anesthetic. You may also be sedated. You will likely be able to go home the same day. Your hand will be wrapped in a soft bandage. You may need to wear a plaster splint for a short time to keep the finger or thumb still as it heals. The stitches will be removed in about 2 weeks. Your healthcare provider will talk with you about the risks and benefits of surgery.  [employer] last reviewed this educational content on 1/1/2018 2000-2021 [employer]. All rights reserved. This information is not intended as a substitute for professional medical care. Always follow your healthcare professional's instructions.                Vive Trigger Finger Splint Brace - Middle, Pinky, Pointer, Ring and Thumb Support - Palm Strap

## 2021-06-11 NOTE — LETTER
6/11/2021         RE: Mackenzie Trinidad  [address]   [place] [address]        Dear Colleague,    Thank you for referring your patient, Mackenzie Trinidad, to the [facility]. Please see a copy of my visit note below.    ASSESSMENT & PLAN    Mackenzie was seen today for pain.    Diagnoses and all orders for this visit:    Left elbow pain  -     XR Elbow LT G/E 3 vw    Contusion of left hand, initial encounter  -     Orthopedic & Spine  Referral    Wrist sprain, left, initial encounter  -     Orthopedic & Spine  Referral    Trigger middle finger of right hand      This issue is acute and Worsening.    # Left Hand/Elbow Injury: Notable after a fall on 6/3/21 with left hand/wrist and elbow pain.  Repeat x-rays showing no fracture, mild pain over lateral elbow and 2,3, 5 metacarpal bones.  Likely wrist/elbow sprain after a fall.  Symptoms overall improving.  Given this plan to recommend home exercises including range of motion and and follow-up as needed.    # Right Trigger Finger 3rd digit: Notable over the past couple of years.  There is triggering of the third digit at the A1 pulley grade 1.  Given symptoms do not bother that much plan to treat as below and follow-up only as needed.  Can consider steroid injections if refractory.  Image Findings: neg elbow and wrist fractures  Treatment: night trigger finger brace   Medications/Injections: Limited tylenol  for pain for 1-2 weeks  Follow-up: In one month if symptoms do not improve, sooner if worsening    Please call [phone]   Ask for my team if you have any questions or concerns       Tate Henderson MD  [facility]    -----  Chief Complaint   Patient presents with     Left Wrist - Pain       SUBJECTIVE  Mackenzie Trinidad is a/an 76 year old female who is seen in consultation at the request of  Bebo Hernandez M.D. for evaluation of left hand/wrist pain.     The  "patient is seen by themselves.  The patient is Right handed    Onset: 6/3/21, 8 day(s) ago. Patient describes injury as tripped over sidewalk-fell and hip hand and left side of head.  Presented to ED 6/3/21 and CT of head and face were performed along with xray of wrist and hand. Had follow up with PCP 6/10/21 and updated xrays of hand were performed.  Pt noting an ache going up to her elbow.  Pt reports bruising improving.  She does have HA but PCP following this with neg CT head.  Pain over 2nd and 4/5th metacarpals.  Reviewed previous notes and x-rays.   Location of Pain: left 4th and 5th metacarpals   Worsened by: increased movements   Better with: rest   Treatments tried:  ice, splint  Associated symptoms: bruising and swelling     Orthopedic/Surgical history: NO  Social History/Occupation: Retired     No family history pertinent to patient's problem today. No family history pertinent to the patient's problem today      REVIEW OF SYSTEMS:  Review of Systems   ROS: 10 point ROS neg other than the symptoms noted above in the HPI.      OBJECTIVE:  BP (!) 142/76   Ht 1.626 m (5' 4\")   Wt 85.3 kg (188 lb)   BMI 32.27 kg/m     General: healthy, alert and in no distress  HEENT: no scleral icterus or conjunctival erythema  Skin: no suspicious lesions or rash. No jaundice.  CV: distal perfusion intact    Resp: normal respiratory effort without conversational dyspnea   Psych: normal mood and affect  Gait: normal steady gait with appropriate coordination and balance    Neuro: Normal light sensory exam of left upper extremity     Hand/wrist (left):  No deformity noted.  No edema, ecchymosis.  Full range of motion in forearm with pronation and supination.  Full range of motion in wrist with flexion, extension, radial and ulnar deviation.  Full  and intrinsic strength.  Radial pulses normal, +2/4, capillary refill brisk.  Mild pain over 2,4,5 metacarpals  No tenderness to palpation over distal radius, ulna, carpal " bones, fingers.  No tenderness thenar or hypothenar eminences.  No snuffbox tenderness.  Triggering of right 3rd digit    Intact and symmetric strength and sensation in the median/radial/unlar distributions bilaterally       RADIOLOGY:  I independently, visualized and reviewed these images with the patient  CMC arthritis, no elbow fracture or arthritis   HAND LEFT THREE OR MORE VIEWS  6/10/2021 3:27 PM      HISTORY: Pain of left hand     COMPARISON: 6/3/2021 x-ray.                                                                      IMPRESSION: Advanced first carpometacarpal degenerative changes. Old  ulnar styloid tiny avulsion fracture. No evidence of acute fracture.  No significant change.     HODAN PITTS MD    WRIST LEFT THREE OR MORE VIEWS, LEFT HAND THREE OR MORE  6/3/2021 1:06  PM      HISTORY:  Trauma, pain.     FINDINGS: Fairly prominent degenerative arthrosis at the thumb CMC  joint. There is a tiny, old, ununited ulnar styloid fracture.                                                                      IMPRESSION: No acute fracture identified.     JANESSA ALONZO MD    XR ELBOW LEFT G/E 3 VIEWS 6/11/2021 2:27 PM      HISTORY: concern for radial head fracture; Left elbow pain                                                                      IMPRESSION: Negative exam.     SPENCER DAVIDSON MD                Again, thank you for allowing me to participate in the care of your patient.        Sincerely,        Tate Henderson MD

## 2021-08-17 ENCOUNTER — TELEPHONE (OUTPATIENT)
Dept: FAMILY MEDICINE | Facility: CLINIC | Age: 76
End: 2021-08-17

## 2021-08-17 NOTE — TELEPHONE ENCOUNTER
Panel Management Review      Patient has the following on her problem list:     Diabetes    ASA: Passed    Last A1C  Lab Results   Component Value Date    A1C 7.3 02/04/2021    A1C 8.3 10/12/2020    A1C 8.1 08/22/2019    A1C 8.2 10/16/2018    A1C 8.1 07/11/2018     A1C tested: Passed    Last LDL:    Lab Results   Component Value Date    CHOL 177 10/12/2020     Lab Results   Component Value Date    HDL 80 10/12/2020     Lab Results   Component Value Date    LDL 66 10/12/2020     Lab Results   Component Value Date    TRIG 155 10/12/2020     Lab Results   Component Value Date    CHOLHDLRATIO 2.7 08/21/2015     Lab Results   Component Value Date    NHDL 97 10/12/2020       Is the patient on a Statin? YES             Is the patient on Aspirin? YES    Medications     HMG CoA Reductase Inhibitors     pravastatin (PRAVACHOL) 40 MG tablet       Salicylates     aspirin 81 MG tablet             Last three blood pressure readings:  BP Readings from Last 3 Encounters:   06/11/21 (!) 142/76   06/10/21 (!) 164/62   06/03/21 (!) 169/80       Date of last diabetes office visit: 02/04/21     Tobacco History:     History   Smoking Status     Former Smoker     Packs/day: 0.00     Years: 0.00     Quit date: 1/1/1971   Smokeless Tobacco     Never Used         Hypertension   Last three blood pressure readings:  BP Readings from Last 3 Encounters:   06/11/21 (!) 142/76   06/10/21 (!) 164/62   06/03/21 (!) 169/80     Blood pressure: FAILED    HTN Guidelines:  Less than 140/90      Composite cancer screening  Chart review shows that this patient is due/due soon for the following Mammogram  Summary:    Patient is due/failing the following:   Medicare Annual Wellness, A1C, BP CHECK and MAMMOGRAM    Action needed:   Patient needs office visit for medicare wellness, diabetes, BP.    Type of outreach:    Sent [employer] message.    Questions for provider review:    None                                                                                                                                     Iliana Wang, [facility]       Chart routed to none .

## 2021-09-19 ENCOUNTER — HEALTH MAINTENANCE LETTER (OUTPATIENT)
Age: 76
End: 2021-09-19

## 2021-09-27 DIAGNOSIS — E11.9 TYPE 2 DIABETES, HBA1C GOAL < 7% (H): ICD-10-CM

## 2021-09-27 RX ORDER — GLIMEPIRIDE 1 MG/1
1 TABLET ORAL
Qty: 90 TABLET | Refills: 1 | Status: SHIPPED | OUTPATIENT
Start: 2021-09-27 | End: 2021-10-26

## 2021-10-26 ENCOUNTER — OFFICE VISIT (OUTPATIENT)
Dept: FAMILY MEDICINE | Facility: CLINIC | Age: 76
End: 2021-10-26
Payer: MEDICARE

## 2021-10-26 VITALS
OXYGEN SATURATION: 98 % | TEMPERATURE: 98.5 F | WEIGHT: 182 LBS | DIASTOLIC BLOOD PRESSURE: 72 MMHG | SYSTOLIC BLOOD PRESSURE: 148 MMHG | HEART RATE: 75 BPM | BODY MASS INDEX: 31.07 KG/M2 | HEIGHT: 64 IN

## 2021-10-26 DIAGNOSIS — Z12.31 ENCOUNTER FOR SCREENING MAMMOGRAM FOR MALIGNANT NEOPLASM OF BREAST: ICD-10-CM

## 2021-10-26 DIAGNOSIS — Z00.00 ENCOUNTER FOR ANNUAL WELLNESS EXAM IN MEDICARE PATIENT: ICD-10-CM

## 2021-10-26 DIAGNOSIS — E11.9 TYPE 2 DIABETES, HBA1C GOAL < 7% (H): ICD-10-CM

## 2021-10-26 DIAGNOSIS — E11.69 TYPE 2 DIABETES MELLITUS WITH HYPERLIPIDEMIA (H): ICD-10-CM

## 2021-10-26 DIAGNOSIS — I10 BENIGN ESSENTIAL HYPERTENSION: Primary | ICD-10-CM

## 2021-10-26 DIAGNOSIS — E78.5 HYPERLIPIDEMIA LDL GOAL <100: ICD-10-CM

## 2021-10-26 DIAGNOSIS — E78.5 TYPE 2 DIABETES MELLITUS WITH HYPERLIPIDEMIA (H): ICD-10-CM

## 2021-10-26 LAB
ALBUMIN SERPL-MCNC: 3.2 G/DL (ref 3.4–5)
ALP SERPL-CCNC: 69 U/L (ref 40–150)
ALT SERPL W P-5'-P-CCNC: 23 U/L (ref 0–50)
ANION GAP SERPL CALCULATED.3IONS-SCNC: 6 MMOL/L (ref 3–14)
AST SERPL W P-5'-P-CCNC: 19 U/L (ref 0–45)
BILIRUB SERPL-MCNC: 0.8 MG/DL (ref 0.2–1.3)
BUN SERPL-MCNC: 10 MG/DL (ref 7–30)
CALCIUM SERPL-MCNC: 9 MG/DL (ref 8.5–10.1)
CHLORIDE BLD-SCNC: 100 MMOL/L (ref 94–109)
CHOLEST SERPL-MCNC: 172 MG/DL
CO2 SERPL-SCNC: 29 MMOL/L (ref 20–32)
CREAT SERPL-MCNC: 0.63 MG/DL (ref 0.52–1.04)
FASTING STATUS PATIENT QL REPORTED: YES
GFR SERPL CREATININE-BSD FRML MDRD: 89 ML/MIN/1.73M2
GLUCOSE BLD-MCNC: 169 MG/DL (ref 70–99)
HBA1C MFR BLD: 7.3 % (ref 0–5.6)
HDLC SERPL-MCNC: 89 MG/DL
LDLC SERPL CALC-MCNC: 54 MG/DL
NONHDLC SERPL-MCNC: 83 MG/DL
POTASSIUM BLD-SCNC: 4.5 MMOL/L (ref 3.4–5.3)
PROT SERPL-MCNC: 7 G/DL (ref 6.8–8.8)
SODIUM SERPL-SCNC: 135 MMOL/L (ref 133–144)
TRIGL SERPL-MCNC: 144 MG/DL

## 2021-10-26 PROCEDURE — 36415 COLL VENOUS BLD VENIPUNCTURE: CPT | Performed by: FAMILY MEDICINE

## 2021-10-26 PROCEDURE — 80053 COMPREHEN METABOLIC PANEL: CPT | Performed by: FAMILY MEDICINE

## 2021-10-26 PROCEDURE — 83036 HEMOGLOBIN GLYCOSYLATED A1C: CPT | Performed by: FAMILY MEDICINE

## 2021-10-26 PROCEDURE — 80061 LIPID PANEL: CPT | Performed by: FAMILY MEDICINE

## 2021-10-26 PROCEDURE — G0439 PPPS, SUBSEQ VISIT: HCPCS | Performed by: FAMILY MEDICINE

## 2021-10-26 PROCEDURE — 99213 OFFICE O/P EST LOW 20 MIN: CPT | Mod: 25 | Performed by: FAMILY MEDICINE

## 2021-10-26 RX ORDER — METFORMIN HCL 500 MG
1000 TABLET, EXTENDED RELEASE 24 HR ORAL 2 TIMES DAILY WITH MEALS
Qty: 360 TABLET | Refills: 3 | Status: SHIPPED | OUTPATIENT
Start: 2021-10-26 | End: 2022-09-07

## 2021-10-26 RX ORDER — FLUOROURACIL 50 MG/G
CREAM TOPICAL
COMMUNITY
Start: 2021-01-27 | End: 2021-10-26

## 2021-10-26 RX ORDER — PRAVASTATIN SODIUM 40 MG
40 TABLET ORAL DAILY
Qty: 90 TABLET | Refills: 3 | Status: SHIPPED | OUTPATIENT
Start: 2021-10-26 | End: 2022-09-07

## 2021-10-26 RX ORDER — HYDROCHLOROTHIAZIDE 12.5 MG/1
12.5 TABLET ORAL DAILY
Qty: 90 TABLET | Refills: 3 | Status: SHIPPED | OUTPATIENT
Start: 2021-10-26 | End: 2022-09-07

## 2021-10-26 RX ORDER — GLIMEPIRIDE 1 MG/1
1 TABLET ORAL
Qty: 90 TABLET | Refills: 3 | Status: SHIPPED | OUTPATIENT
Start: 2021-10-26 | End: 2022-09-07

## 2021-10-26 RX ORDER — LOSARTAN POTASSIUM 100 MG/1
100 TABLET ORAL DAILY
Qty: 90 TABLET | Refills: 3 | Status: SHIPPED | OUTPATIENT
Start: 2021-10-26 | End: 2022-09-07

## 2021-10-26 ASSESSMENT — ENCOUNTER SYMPTOMS
NAUSEA: 0
COUGH: 0
WEAKNESS: 0
MYALGIAS: 0
HEMATOCHEZIA: 0
HEMATURIA: 0
EYE PAIN: 0
CONSTIPATION: 0
PARESTHESIAS: 0
SHORTNESS OF BREATH: 0
FREQUENCY: 0
SORE THROAT: 0
HEADACHES: 0
DIARRHEA: 0
PALPITATIONS: 0
CHILLS: 0
ARTHRALGIAS: 0
JOINT SWELLING: 0
HEARTBURN: 0
DIZZINESS: 0
FEVER: 0
ABDOMINAL PAIN: 0
NERVOUS/ANXIOUS: 0
DYSURIA: 0

## 2021-10-26 ASSESSMENT — ACTIVITIES OF DAILY LIVING (ADL): CURRENT_FUNCTION: NO ASSISTANCE NEEDED

## 2021-10-26 ASSESSMENT — MIFFLIN-ST. JEOR: SCORE: 1310.55

## 2021-10-26 NOTE — PROGRESS NOTES
"SUBJECTIVE:   Mackenzie Trinidad is a 76 year old female who presents for Preventive Visit.    Patient has been advised of split billing requirements and indicates understanding: Yes   Are you in the first 12 months of your Medicare coverage?  No    Healthy Habits:     In general, how would you rate your overall health?  Good    Frequency of exercise:  2-3 days/week    Duration of exercise:  15-30 minutes    Do you usually eat at least 4 servings of fruit and vegetables a day, include whole grains    & fiber and avoid regularly eating high fat or \"junk\" foods?  Yes    Taking medications regularly:  Yes    Medication side effects:  None    Ability to successfully perform activities of daily living:  No assistance needed    Home Safety:  No safety concerns identified    Hearing Impairment:  No hearing concerns    In the past 6 months, have you been bothered by leaking of urine? Yes    In general, how would you rate your overall mental or emotional health?  Excellent      PHQ-2 Total Score: 0    Additional concerns today:  No    Do you feel safe in your environment? Yes    Have you ever done Advance Care Planning? (For example, a Health Directive, POLST, or a discussion with a medical provider or your loved ones about your wishes): Yes, advance care planning is on file.    Fall risk  Fallen 2 or more times in the past year?: No  Any fall with injury in the past year?: No    Cognitive Screening   1) Repeat 3 items (Leader, Season, Table)    2) Clock draw: NORMAL  3) 3 item recall: Recalls 3 objects  Results: 3 items recalled: COGNITIVE IMPAIRMENT LESS LIKELY  Mini-CogTM Copyright JOSE Cerda. Licensed by the author for use in [facility]; reprinted with permission (rodrigo@.[facility]). All rights reserved.        Do you have sleep apnea, excessive snoring or daytime drowsiness?: no    Diabetes Follow-up  How often are you checking your blood sugar? One time daily  What time of day are you checking your blood sugars " (select all that apply)?  Before meals  Have you had any blood sugars above 200?  No  Have you had any blood sugars below 70?  No    What symptoms do you notice when your blood sugar is low?  None    What concerns do you have today about your diabetes? None     Do you have any of these symptoms? (Select all that apply)  No numbness or tingling in feet.  No redness, sores or blisters on feet.  No complaints of excessive thirst.  No reports of blurry vision.  No significant changes to weight.        Hyperlipidemia Follow-Up    Are you regularly taking any medication or supplement to lower your cholesterol?   Yes- Pravastatin    Are you having muscle aches or other side effects that you think could be caused by your cholesterol lowering medication?  No    Hypertension Follow-up    Do you check your blood pressure regularly outside of the clinic? Yes     Are you following a low salt diet? Yes    Are your blood pressures ever more than 140 on the top number (systolic) OR more   than 90 on the bottom number (diastolic), for example 140/90? No    BP Readings from Last 2 Encounters:   10/26/21 (!) 158/80   21 (!) 142/76     Hemoglobin A1C (%)   Date Value   10/26/2021 7.3 (H)   2021 7.3 (H)   10/12/2020 8.3 (H)     LDL Cholesterol Calculated (mg/dL)   Date Value   10/12/2020 66   2019 74           Reviewed and updated as needed this visit by clinical staff   Allergies               Reviewed and updated as needed this visit by Provider                Social History     Tobacco Use     Smoking status: Former Smoker     Packs/day: 0.00     Years: 0.00     Pack years: 0.00     Quit date: 1971     Years since quittin.8     Smokeless tobacco: Never Used   Substance Use Topics     Alcohol use: Yes     Comment: social     If you drink alcohol do you typically have >3 drinks per day or >7 drinks per week? No    Alcohol Use 10/26/2021   Prescreen: >3 drinks/day or >7 drinks/week? No   Prescreen: >3  "drinks/day or >7 drinks/week? -   No flowsheet data found.            Current providers sharing in care for this patient include: Patient Care Team:  Dorota Art MD as PCP - General (Family Practice)  Dorota Art MD as Assigned PCP  Francisca Yoder RD as Diabetes Educator (Dietitian, Registered)  Tate Henderson MD as Assigned Musculoskeletal Provider    The following health maintenance items are reviewed in Epic and correct as of today:  Health Maintenance Due   Topic Date Due     ANNUAL REVIEW OF HM ORDERS  Never done     MEDICARE ANNUAL WELLNESS VISIT  08/22/2020     BMP  10/12/2021     LIPID  10/12/2021     DIABETIC FOOT EXAM  10/13/2021     FALL RISK ASSESSMENT  10/13/2021     MAMMO SCREENING  10/12/2021     Lab work is in process  Mammogram Screening: Mammogram Screening: Recommended mammography every 1-2 years with patient discussion and risk factor consideration        Review of Systems   Constitutional: Negative for chills and fever.   HENT: Negative for congestion, ear pain, hearing loss and sore throat.    Eyes: Negative for pain and visual disturbance.   Respiratory: Negative for cough and shortness of breath.    Cardiovascular: Negative for chest pain, palpitations and peripheral edema.   Gastrointestinal: Negative for abdominal pain, constipation, diarrhea, heartburn, hematochezia and nausea.   Genitourinary: Negative for dysuria, frequency, genital sores, hematuria and urgency.   Musculoskeletal: Negative for arthralgias, joint swelling and myalgias.   Skin: Negative for rash.   Neurological: Negative for dizziness, weakness, headaches and paresthesias.   Psychiatric/Behavioral: Negative for mood changes. The patient is not nervous/anxious.          OBJECTIVE:   BP (!) 158/80   Pulse 75   Temp 98.5  F (36.9  C) (Tympanic)   Ht 1.626 m (5' 4\")   Wt 82.6 kg (182 lb)   SpO2 98%   BMI 31.24 kg/m   Estimated body mass index is 31.24 kg/m  as calculated from the following:    " "Height as of this encounter: 1.626 m (5' 4\").    Weight as of this encounter: 82.6 kg (182 lb).  Physical Exam  GENERAL APPEARANCE: healthy, alert and no distress  EYES: Eyes grossly normal to inspection, PERRL and conjunctivae and sclerae normal  HENT: ear canals and TM's normal, nose and mouth without ulcers or lesions, oropharynx clear and oral mucous membranes moist  NECK: no adenopathy, no asymmetry, masses, or scars and thyroid normal to palpation  RESP: lungs clear to auscultation - no rales, rhonchi or wheezes  BREAST: normal without masses, tenderness or nipple discharge and no palpable axillary masses or adenopathy  CV: regular rate and rhythm, normal S1 S2, no S3 or S4, no murmur, click or rub, no peripheral edema and peripheral pulses strong  ABDOMEN: soft, nontender, no hepatosplenomegaly, no masses and bowel sounds normal  MS: no musculoskeletal defects are noted and gait is age appropriate without ataxia  SKIN: no suspicious lesions or rashes  NEURO: Normal strength and tone, sensory exam grossly normal, mentation intact and speech normal  DIABETIC FOOT EXAM: normal DP and PT pulses, no trophic changes or ulcerative lesions and normal sensory exam  PSYCH: mentation appears normal and affect normal/bright    Diagnostic Test Results:  Labs reviewed in Epic  Results for orders placed or performed in visit on 10/26/21 (from the past 24 hour(s))   Hemoglobin A1c   Result Value Ref Range    Hemoglobin A1C 7.3 (H) 0.0 - 5.6 %       ASSESSMENT / PLAN:   (I10) Benign essential hypertension  (primary encounter diagnosis)  Comment:   Plan: Comprehensive metabolic panel (BMP + Alb, Alk         Phos, ALT, AST, Total. Bili, TP), losartan         (COZAAR) 100 MG tablet, hydrochlorothiazide         (HYDRODIURIL) 12.5 MG tablet            (E78.5) Hyperlipidemia LDL goal <100  Comment: well controlled   Plan: Comprehensive metabolic panel (BMP + Alb, Alk         Phos, ALT, AST, Total. Bili, TP), Lipid panel         " "reflex to direct LDL Fasting, pravastatin         (PRAVACHOL) 40 MG tablet            (E11.69,  E78.5) Type 2 diabetes mellitus with hyperlipidemia (H)  Comment: controlled   Plan: Hemoglobin A1c, Comprehensive metabolic panel         (BMP + Alb, Alk Phos, ALT, AST, Total. Bili,         TP), metFORMIN (GLUCOPHAGE-XR) 500 MG 24 hr         tablet            (E11.9) Type 2 diabetes, HbA1c goal < 7% (H)  Comment:   Plan: glimepiride (AMARYL) 1 MG tablet        Well controlled     (Z12.31) Encounter for screening mammogram for malignant neoplasm of breast  Comment:   Plan: *MA Screening Digital Bilateral            (Z00.00) Encounter for annual wellness exam in Medicare patient  Comment:   Plan:     Patient has been advised of split billing requirements and indicates understanding: Yes  COUNSELING:  Reviewed preventive health counseling, as reflected in patient instructions    Estimated body mass index is 31.24 kg/m  as calculated from the following:    Height as of this encounter: 1.626 m (5' 4\").    Weight as of this encounter: 82.6 kg (182 lb).    Weight management plan: Discussed healthy diet and exercise guidelines    She reports that she quit smoking about 50 years ago. She smoked 0.00 packs per day for 0.00 years. She has never used smokeless tobacco.      Appropriate preventive services were discussed with this patient, including applicable screening as appropriate for cardiovascular disease, diabetes, osteopenia/osteoporosis, and glaucoma.  As appropriate for age/gender, discussed screening for colorectal cancer, prostate cancer, breast cancer, and cervical cancer. Checklist reviewing preventive services available has been given to the patient.    Reviewed patients plan of care and provided an AVS. The Basic Care Plan (routine screening as documented in Health Maintenance) for Mackenzie meets the Care Plan requirement. This Care Plan has been established and reviewed with the Patient.    Counseling Resources:  ATP " IV Guidelines  Pooled Cohorts Equation Calculator  Breast Cancer Risk Calculator  Breast Cancer: Medication to Reduce Risk  FRAX Risk Assessment  ICSI Preventive Guidelines  Dietary Guidelines for Americans, 2010  [employer]'s MyPlate  ASA Prophylaxis  Lung CA Screening    Dorota Art MD  [facility]    Identified Health Risks:

## 2021-12-20 ENCOUNTER — HOSPITAL ENCOUNTER (OUTPATIENT)
Dept: MAMMOGRAPHY | Facility: CLINIC | Age: 76
Discharge: HOME | End: 2021-12-20
Attending: FAMILY MEDICINE | Admitting: FAMILY MEDICINE
Payer: MEDICARE

## 2021-12-20 DIAGNOSIS — Z12.31 ENCOUNTER FOR SCREENING MAMMOGRAM FOR MALIGNANT NEOPLASM OF BREAST: ICD-10-CM

## 2021-12-20 PROCEDURE — 77063 BREAST TOMOSYNTHESIS BI: CPT

## 2022-02-08 ENCOUNTER — TRANSFERRED RECORDS (OUTPATIENT)
Dept: HEALTH INFORMATION MANAGEMENT | Facility: CLINIC | Age: 77
End: 2022-02-08
Payer: MEDICARE

## 2022-02-08 LAB — RETINOPATHY: POSITIVE

## 2022-04-30 ENCOUNTER — HEALTH MAINTENANCE LETTER (OUTPATIENT)
Age: 77
End: 2022-04-30

## 2022-06-02 ENCOUNTER — TELEPHONE (OUTPATIENT)
Dept: FAMILY MEDICINE | Facility: CLINIC | Age: 77
End: 2022-06-02
Payer: MEDICARE

## 2022-06-02 NOTE — TELEPHONE ENCOUNTER
Patient Quality Outreach    Patient is due for the following:   Diabetes -  A1C  Hypertension -  BP check    NEXT STEPS:   No follow up needed at this time.   Home blood pressure readings.     Type of outreach:    Phone, spoke to patient/parent.      132/68     Next Steps:  Reach out within 90 days via Phone.    Max number of attempts reached: No. Will try again in 90 days if patient still on fail list.    Questions for provider review:    None     Lay Bergeron, DIRK  Chart routed to  .

## 2022-09-07 ENCOUNTER — OFFICE VISIT (OUTPATIENT)
Dept: FAMILY MEDICINE | Facility: CLINIC | Age: 77
End: 2022-09-07
Payer: MEDICARE

## 2022-09-07 VITALS
TEMPERATURE: 98.4 F | BODY MASS INDEX: 30.56 KG/M2 | SYSTOLIC BLOOD PRESSURE: 128 MMHG | OXYGEN SATURATION: 99 % | HEART RATE: 80 BPM | DIASTOLIC BLOOD PRESSURE: 82 MMHG | HEIGHT: 64 IN | WEIGHT: 179 LBS

## 2022-09-07 DIAGNOSIS — I10 BENIGN ESSENTIAL HYPERTENSION: ICD-10-CM

## 2022-09-07 DIAGNOSIS — E78.5 TYPE 2 DIABETES MELLITUS WITH HYPERLIPIDEMIA (H): ICD-10-CM

## 2022-09-07 DIAGNOSIS — M25.561 RIGHT MEDIAL KNEE PAIN: Primary | ICD-10-CM

## 2022-09-07 DIAGNOSIS — E11.9 TYPE 2 DIABETES, HBA1C GOAL < 7% (H): ICD-10-CM

## 2022-09-07 DIAGNOSIS — E78.5 HYPERLIPIDEMIA LDL GOAL <100: ICD-10-CM

## 2022-09-07 DIAGNOSIS — E11.69 TYPE 2 DIABETES MELLITUS WITH HYPERLIPIDEMIA (H): ICD-10-CM

## 2022-09-07 LAB
ALBUMIN SERPL BCG-MCNC: 3.9 G/DL (ref 3.5–5.2)
ALP SERPL-CCNC: 81 U/L (ref 35–104)
ALT SERPL W P-5'-P-CCNC: 23 U/L (ref 10–35)
ANION GAP SERPL CALCULATED.3IONS-SCNC: 11 MMOL/L (ref 7–15)
AST SERPL W P-5'-P-CCNC: 24 U/L (ref 10–35)
BILIRUB SERPL-MCNC: 0.6 MG/DL
BUN SERPL-MCNC: 13.4 MG/DL (ref 8–23)
CALCIUM SERPL-MCNC: 9.4 MG/DL (ref 8.8–10.2)
CHLORIDE SERPL-SCNC: 98 MMOL/L (ref 98–107)
CHOLEST SERPL-MCNC: 179 MG/DL
CREAT SERPL-MCNC: 0.69 MG/DL (ref 0.51–0.95)
CREAT UR-MCNC: 109.1 MG/DL
DEPRECATED HCO3 PLAS-SCNC: 27 MMOL/L (ref 22–29)
GFR SERPL CREATININE-BSD FRML MDRD: 90 ML/MIN/1.73M2
GLUCOSE SERPL-MCNC: 204 MG/DL (ref 70–99)
HBA1C MFR BLD: 7.5 % (ref 0–5.6)
HDLC SERPL-MCNC: 68 MG/DL
LDLC SERPL CALC-MCNC: 84 MG/DL
MICROALBUMIN UR-MCNC: 16.8 MG/L
MICROALBUMIN/CREAT UR: 15.4 MG/G CR (ref 0–25)
NONHDLC SERPL-MCNC: 111 MG/DL
POTASSIUM SERPL-SCNC: 4.4 MMOL/L (ref 3.4–5.3)
PROT SERPL-MCNC: 6.9 G/DL (ref 6.4–8.3)
SODIUM SERPL-SCNC: 136 MMOL/L (ref 136–145)
TRIGL SERPL-MCNC: 135 MG/DL

## 2022-09-07 PROCEDURE — 80061 LIPID PANEL: CPT | Performed by: FAMILY MEDICINE

## 2022-09-07 PROCEDURE — 36415 COLL VENOUS BLD VENIPUNCTURE: CPT | Performed by: FAMILY MEDICINE

## 2022-09-07 PROCEDURE — 80053 COMPREHEN METABOLIC PANEL: CPT | Performed by: FAMILY MEDICINE

## 2022-09-07 PROCEDURE — 99214 OFFICE O/P EST MOD 30 MIN: CPT | Performed by: FAMILY MEDICINE

## 2022-09-07 PROCEDURE — 83036 HEMOGLOBIN GLYCOSYLATED A1C: CPT | Performed by: FAMILY MEDICINE

## 2022-09-07 PROCEDURE — 82043 UR ALBUMIN QUANTITATIVE: CPT | Performed by: FAMILY MEDICINE

## 2022-09-07 RX ORDER — METFORMIN HCL 500 MG
1000 TABLET, EXTENDED RELEASE 24 HR ORAL 2 TIMES DAILY WITH MEALS
Qty: 360 TABLET | Refills: 3 | Status: SHIPPED | OUTPATIENT
Start: 2022-09-07 | End: 2023-10-04

## 2022-09-07 RX ORDER — HYDROCHLOROTHIAZIDE 12.5 MG/1
12.5 TABLET ORAL DAILY
Qty: 90 TABLET | Refills: 3 | Status: SHIPPED | OUTPATIENT
Start: 2022-09-07 | End: 2023-09-09

## 2022-09-07 RX ORDER — GLIMEPIRIDE 1 MG/1
1 TABLET ORAL
Qty: 90 TABLET | Refills: 3 | Status: SHIPPED | OUTPATIENT
Start: 2022-09-07 | End: 2023-07-20

## 2022-09-07 RX ORDER — LOSARTAN POTASSIUM 100 MG/1
100 TABLET ORAL DAILY
Qty: 90 TABLET | Refills: 3 | Status: SHIPPED | OUTPATIENT
Start: 2022-09-07 | End: 2023-10-09

## 2022-09-07 RX ORDER — PRAVASTATIN SODIUM 40 MG
40 TABLET ORAL DAILY
Qty: 90 TABLET | Refills: 3 | Status: SHIPPED | OUTPATIENT
Start: 2022-09-07 | End: 2023-07-06

## 2022-09-07 NOTE — PROGRESS NOTES
SUBJECTIVE:                                                    Mackenzie Trinidad is a 77 year old female who presents to clinic today for the following health issues:    Answers for HPI/ROS submitted by the patient on 9/7/2022  Frequency of checking blood sugars:: two times daily  What time of day are you checking your blood sugars : before and after meals  Have you had any blood sugars above 200?: No  Have you had any blood sugars below 70?: No  Hypoglycemia symptoms:: none  Diabetic concerns:: none  Paraesthesia present:: none of these symptoms  How many servings of fruits and vegetables do you eat daily?: 4 or more  On average, how many sweetened beverages do you drink each day (Examples: soda, juice, sweet tea, etc.  Do NOT count diet or artificially sweetened beverages)?: 0  How many minutes a day do you exercise enough to make your heart beat faster?: 20 to 29  How many days a week do you exercise enough to make your heart beat faster?: 4  How many days per week do you miss taking your medication?: 0      Has some right knee medial pain this has been going on for a few months she cannot do her long walks with this pain     Hyperlipidemia Follow-Up    Hypertension Follow-up    BP Readings from Last 2 Encounters:   09/07/22 128/82   10/26/21 (!) 148/72     Hemoglobin A1C (%)   Date Value   10/26/2021 7.3 (H)   02/04/2021 7.3 (H)   10/12/2020 8.3 (H)     LDL Cholesterol Calculated (mg/dL)   Date Value   10/26/2021 54   10/12/2020 66   08/22/2019 74         Problem list and histories reviewed & adjusted, as indicated.  Additional history:     She is doing well she just got back from an [employer] [place] her knee wasn't too bad   She has had the knee pain for a few maybe 3 or 4 months.  Its not necessarily getting worse but she is cutting back on her walking more and more so I think it is getting worse it is medially along the edge of the patella no joint line tenderness.  It is not locking and the pain is manageable  "but as I stated she would like to be able to walk farther.  ROS:  Constitutional, HEENT, cardiovascular, pulmonary, gi and gu systems are negative, except as otherwise noted.    OBJECTIVE:                                                    /82   Pulse 80   Temp 98.4  F (36.9  C) (Tympanic)   Ht 1.626 m (5' 4\")   Wt 81.2 kg (179 lb)   SpO2 99%   BMI 30.73 kg/m   Body mass index is 30.73 kg/m .   GENERAL:: healthy, alert and no distress  EYES: Eyes grossly normal to inspection, extraocular movements - intact, and PERRL  HENT: ear canals- normal; TMs- normal; Nose- normal; Mouth- no ulcers, no lesions  NECK: no tenderness, no adenopathy, no asymmetry, no masses, no stiffness; thyroid- normal to palpation  RESP: lungs clear to auscultation - no rales, no rhonchi, no wheezes  BREAST: no masses, no tenderness, no nipple discharge, no palpable axillary masses or adenopathy  CV: regular rates and rhythm, normal S1 S2, no S3 or S4 and no murmur, no click or rub -  ABDOMEN: soft, no tenderness, no  hepatosplenomegaly, no masses, normal bowel sounds  MS: extremities- no gross deformities noted, no edema right lower extremity shows full range of motion there is crepitance with compression of the patella and flexion little bit of pain to palpation otherwise muscle strength is normal there is no Vanessa's sign ligaments are all normal.  NEURO: strength and tone- normal, sensory exam- grossly normal, mentation- intact, speech- normal, reflexes- symmetric  PSYCH: Alert and oriented times 3; speech- coherent , normal rate and volume; able to articulate logical thoughts, able to abstract reason, no tangential thoughts, no hallucinations or delusions, affect- normal       ASSESSMENT/PLAN:                                                      1. Type 2 diabetes, HbA1c goal < 7% (H)  Well controlled   - glimepiride (AMARYL) 1 MG tablet; Take 1 tablet (1 mg) by mouth every morning (before breakfast)  Dispense: 90 tablet; " Refill: 3    2. Benign essential hypertension  Well controlled cont medications   - Albumin Random Urine Quantitative with Creat Ratio; Future  - HEMOGLOBIN A1C; Future  - hydrochlorothiazide (HYDRODIURIL) 12.5 MG tablet; Take 1 tablet (12.5 mg) by mouth daily  Dispense: 90 tablet; Refill: 3  - losartan (COZAAR) 100 MG tablet; Take 1 tablet (100 mg) by mouth daily  Dispense: 90 tablet; Refill: 3  - Comprehensive metabolic panel (BMP + Alb, Alk Phos, ALT, AST, Total. Bili, TP); Future  - HEMOGLOBIN A1C  - Comprehensive metabolic panel (BMP + Alb, Alk Phos, ALT, AST, Total. Bili, TP)  - Albumin Random Urine Quantitative with Creat Ratio    3. Type 2 diabetes mellitus with hyperlipidemia (H)  As above   - Albumin Random Urine Quantitative with Creat Ratio; Future  - HEMOGLOBIN A1C; Future  - metFORMIN (GLUCOPHAGE XR) 500 MG 24 hr tablet; Take 2 tablets (1,000 mg) by mouth 2 times daily (with meals)  Dispense: 360 tablet; Refill: 3  - Comprehensive metabolic panel (BMP + Alb, Alk Phos, ALT, AST, Total. Bili, TP); Future  - HEMOGLOBIN A1C  - Comprehensive metabolic panel (BMP + Alb, Alk Phos, ALT, AST, Total. Bili, TP)  - Albumin Random Urine Quantitative with Creat Ratio    4. Hyperlipidemia LDL goal <100  Monitoring   - pravastatin (PRAVACHOL) 40 MG tablet; Take 1 tablet (40 mg) by mouth daily  Dispense: 90 tablet; Refill: 3  - Lipid panel reflex to direct LDL Fasting; Future  - Comprehensive metabolic panel (BMP + Alb, Alk Phos, ALT, AST, Total. Bili, TP); Future  - Lipid panel reflex to direct LDL Fasting  - Comprehensive metabolic panel (BMP + Alb, Alk Phos, ALT, AST, Total. Bili, TP)    5. Right medial knee pain  Likely some chondromalcia. willl have her see physical therapy first.   - Physical Therapy Referral; Future     reports that she quit smoking about 51 years ago. She smoked 0.00 packs per day for 0.00 years. She has never used smokeless tobacco.          Dorota Art M.D.    [facility]  [place]

## 2022-09-15 ENCOUNTER — HOSPITAL ENCOUNTER (OUTPATIENT)
Dept: PHYSICAL THERAPY | Facility: CLINIC | Age: 77
Setting detail: THERAPIES SERIES
Discharge: HOME | End: 2022-09-15
Attending: FAMILY MEDICINE
Payer: MEDICARE

## 2022-09-15 DIAGNOSIS — M25.561 RIGHT MEDIAL KNEE PAIN: ICD-10-CM

## 2022-09-15 PROCEDURE — 97110 THERAPEUTIC EXERCISES: CPT | Mod: GP

## 2022-09-15 PROCEDURE — 97161 PT EVAL LOW COMPLEX 20 MIN: CPT | Mod: GP

## 2022-09-15 PROCEDURE — 97112 NEUROMUSCULAR REEDUCATION: CPT | Mod: GP

## 2022-09-15 NOTE — PROGRESS NOTES
[facility]    OUTPATIENT PHYSICAL THERAPY ORTHOPEDIC EVALUATION  PLAN OF TREATMENT FOR OUTPATIENT REHABILITATION  (COMPLETE FOR INITIAL CLAIMS ONLY)  Patient's Last Name, First Name, M.I.  YOB: 1945  Mackenzie Trinidad    Provider s Name:  [facility]   Medical Record No.  [id number]   Start of Care Date:  09/15/22   Onset Date:  09/07/22 (MD referral date)   Type:     _X__PT   ___OT   ___SLP Medical Diagnosis:  R medial knee pain     PT Diagnosis:  R knee pain   Visits from SOC:  1      _________________________________________________________________________________  Plan of Treatment/Functional Goals:  manual therapy, strengthening, motor coordination training, neuromuscular re-education     Electrical stimulation     Goals  Goal Identifier: STG  Goal Description: Pt will have 0/10 medial R knee pain when walking > or equal to 1 mile (or up to 25 min total, continuous) in order to return to completing recreational long distance walking for exercise.  Target Date: 10/06/22    Goal Identifier: LTG  Goal Description: Pt will have 5/5 B quad MMT w/ good VMO activation in order to complete ADLs painfree.  Target Date: 11/10/22    Goal Identifier: STG  Goal Description: Pt will be independent in HEP in order to strengthen & continue intervention at home.  Target Date: 10/06/22                                                           Therapy Frequency:  other (see comments) (1 x every other wk for 8 wks)  Predicted Duration of Therapy Intervention:  8 wks    Kelsi Odonnell, PT                 I CERTIFY THE NEED FOR THESE SERVICES FURNISHED UNDER        THIS PLAN OF TREATMENT AND WHILE UNDER MY CARE     (Physician co-signature of this document indicates review and certification of the therapy plan).                       Certification Date From:  09/15/22   Certification Date  To:  11/10/22    Referring Provider:  Dr. Art    Initial Assessment        See Epic Evaluation Start of Care Date: 09/15/22

## 2022-09-15 NOTE — PROGRESS NOTES
Physical Therapy Initial Knee Evaluation    Mackenzie Trinidad   09/15/22 0800   General Information   Type of Visit Initial OP Ortho PT Evaluation   Start of Care Date 09/15/22   Referring Physician Dr. Art   Patient/Family Goals Statement Get back to completing longer walks w/o pain   Orders Evaluate and Treat   Date of Order 09/07/22   Certification Required? Yes   Medical Diagnosis R medial knee pain   Surgical/Medical history reviewed Yes   Precautions/Limitations no known precautions/limitations   General Information Comments PMHx: HTN, DM2       Present No   Body Part(s)   Body Part(s) Knee   Presentation and Etiology   Pertinent history of current problem (include personal factors and/or comorbidities that impact the POC) Pt presents to clinic w/ c/o R medial knee pain that has been ongoing for ~2 months, insidious onset. Pain comes & goes feeling sharp; currently, pain at 2/10 & located at R medial border of knee cap. Pain also is not tied to any particular time of day & does not get better or worse as time progresses. She does note that pain starts w/ longer walking. She has had no previous R knee injuries or surgeries. Pt enjoys long distance walking (1.5-2 miles) & playing golf in summer. Normally wears supportive shoes.   Impairments A. Pain;F. Decreased strength and endurance   Functional Limitations perform desired leisure / sports activities;perform activities of daily living   Symptom Location R medial knee, medial border of patella   How/Where did it occur From insidious onset   Onset date of current episode/exacerbation 09/07/22  (MD referral date)   Pain rating (0-10 point scale) Best (/10);Worst (/10)   Best (/10) 2   Worst (/10) 5   Pain quality A. Sharp   Frequency of pain/symptoms C. With activity   Pain/symptoms are: Other  (Intermittent & usually w/ longer walking)   Pain/symptoms exacerbated by B. Walking   Pain/symptoms eased by C. Rest   Progression of symptoms  since onset: Improved   Current / Previous Interventions   Other diagnostic tests None   Prior Level of Function   Prior Level of Function-Mobility No limitations, no pain   Prior Level of Function-ADLs No limitations, no pain   Current Level of Function   Current Community Support Family/friend caregiver   Patient role/employment history F. Retired   Living environment House/townhome   Home/community accessibility One level ranch home, 3-5 stairs to enter   Current equipment-Gait/Locomotion None   Current equipment-ADL None   Fall Risk Screen   Fall screen completed by PT   Have you fallen 2 or more times in the past year? Yes   Have you fallen and had an injury in the past year? Yes   Timed Up and Go score (seconds) 11.96   Is patient a fall risk? No   Fall screen comments Pt fell once in last year, she stumbed her toe on a small curb & fell down when out at a restaurant. She hit her head & went to ER for further followup - no concussion resulted.   Abuse Screen (yes response referral indicated)   Feels Unsafe at Home or Work/School no   Feels Threatened by Someone no   Does Anyone Try to Keep You From Having Contact with Others or Doing Things Outside Your Home? no   Physical Signs of Abuse Present no   Knee Objective Findings   Gait/Locomotion Slower susan, but normal step length. No antalgic gait pattern present.   Palpation R medial border of patella tender, no pain at medial joint line or MCL.   Accessory Motion/Joint Mobility B patellar mobility minor limitation in M/L planes   Observation Functional assessment of squat & sit<>stand, no B knee valgus present & no pain.   Integumentary  No swelling present when compared bilaterally   Side (if bilateral, select both right and left) Right;Left   Right Knee Extension AROM 0, slight discomfort noted   Right Knee Flexion AROM 135, no pain   Right Knee Flexion Strength 5/5   Right Knee Extension Strength 4-/5   Right Hip Abduction Strength 4-/5  (Hand hold  compensation)   Right Quad Set Strength Lateral activiation   R VMO Strength Poor   Left Knee Extension AROM 0   Left Knee Flexion AROM 140   Left Knee Flexion Strength 5/5   Left Knee Extension Strength 4+/5   Left Hip Abduction Strength 3+/5   Left Quad Set Strength Lateral activation   L VMO Strength Poor   Planned Therapy Interventions   Planned Therapy Interventions manual therapy;strengthening;motor coordination training;neuromuscular re-education   Planned Modality Interventions   Planned Modality Interventions Electrical stimulation   Clinical Impression   Criteria for Skilled Therapeutic Interventions Met yes, treatment indicated   PT Diagnosis R knee pain   Influenced by the following impairments Weakness, pain, muscle incoordination, endurance   Functional limitations due to impairments Walking   Clinical Presentation Stable/Uncomplicated   Clinical Presentation Rationale Clinical judgment   Clinical Decision Making (Complexity) Low complexity   Therapy Frequency other (see comments)  (1 x every other wk for 8 wks)   Predicted Duration of Therapy Intervention (days/wks) 8 wks   Risk & Benefits of therapy have been explained Yes   Patient, Family & other staff in agreement with plan of care Yes   Clinical Impression Comments Pt presents to clinic w/ c/o R medial knee pain that has been ongoing for ~2 months, insidious onset. Pain comes & goes feeling sharp; currently, pain at 2/10 & located at R medial border of knee cap. Pain also is not tied to any particular time of day & does not get better or worse as time progresses. She does note that pain starts w/ longer walking. She has had no previous R knee injuries or surgeries. Pt enjoys long distance walking (1.5-2 miles) & playing golf in summer. Normally wears supportive shoes. Pt would benefit from skilled PT for strengthening, edurance & neuro-reedu. in order to reduce R knee pain complaints & complete long distance walking for exercise.   Education  Assessment   Preferred Learning Style Listening;Demonstration   Barriers to Learning No barriers   Ortho Goal 1   Goal Identifier STG   Goal Description Pt will have 0/10 medial R knee pain when walking > or equal to 1 mile (or up to 25 min total, continuous) in order to return to completing recreational long distance walking for exercise.   Target Date 10/06/22   Ortho Goal 2   Goal Identifier LTG   Goal Description Pt will have 5/5 B quad MMT w/ good VMO activation in order to complete ADLs painfree.   Target Date 11/10/22   Ortho Goal 3   Goal Identifier STG   Goal Description Pt will be independent in HEP in order to strengthen & continue intervention at home.   Target Date 10/06/22   Total Evaluation Time   PT Eval, Low Complexity Minutes (80751) 30   Therapy Certification   Certification date from 09/15/22   Certification date to 11/10/22   Medical Diagnosis R medial knee pain

## 2022-09-30 ENCOUNTER — HOSPITAL ENCOUNTER (OUTPATIENT)
Dept: PHYSICAL THERAPY | Facility: CLINIC | Age: 77
Setting detail: THERAPIES SERIES
Discharge: HOME | End: 2022-09-30
Attending: FAMILY MEDICINE
Payer: MEDICARE

## 2022-09-30 PROCEDURE — 97110 THERAPEUTIC EXERCISES: CPT | Mod: GP

## 2022-09-30 PROCEDURE — 97112 NEUROMUSCULAR REEDUCATION: CPT | Mod: GP

## 2022-10-03 DIAGNOSIS — E11.9 TYPE 2 DIABETES, HBA1C GOAL < 7% (H): ICD-10-CM

## 2022-10-04 RX ORDER — BLOOD SUGAR DIAGNOSTIC
STRIP MISCELLANEOUS
Qty: 200 STRIP | Refills: 0 | Status: SHIPPED | OUTPATIENT
Start: 2022-10-04 | End: 2023-09-25

## 2022-11-16 NOTE — PROGRESS NOTES
[facility]    Outpatient Physical Therapy Discharge Note  Patient: Mackenzie Trinidad  : 1945    Beginning/End Dates of Reporting Period:  09/15/2022 to 2022    Referring Provider: Dorota Art MD    Therapy Diagnosis: Right medial knee pain     Client Self Report: Pt reports that R medial knee pain has improved greatly - no pain ascending/descending stairs. She tried one game of pickleball the other day at the [facility] & did a twisting motions that cause pain. She thinks exercises are helping greatly.    Objective Measurements:  Objective Measure: R Quad Activation  Details: Good w/ (improved) fair VMO activation    Goals:  Goal Identifier STG   Goal Description Pt will have 0/10 medial R knee pain when walking > or equal to 1 mile (or up to 25 min total, continuous) in order to return to completing recreational long distance walking for exercise.   Target Date 10/06/22   Date Met      Progress (detail required for progress note): R knee pain has reduced, but still occurs mainly w/ twisting motions (can sometimes involve amb)     Goal Identifier LTG   Goal Description Pt will have 5/5 B quad MMT w/ good VMO activation in order to complete ADLs painfree.   Target Date 11/10/22   Date Met      Progress (detail required for progress note): Advanced strengthening exercises today     Goal Identifier STG   Goal Description Pt will be independent in HEP in order to strengthen & continue intervention at home.   Target Date 10/06/22   Date Met      Progress (detail required for progress note):  Will ask next visit     Plan:  Discharge from therapy.    Discharge:    Reason for Discharge: Patient has failed to schedule further appointments.    Equipment Issued: None    Discharge Plan: Patient to continue home program.

## 2022-11-20 ENCOUNTER — HEALTH MAINTENANCE LETTER (OUTPATIENT)
Age: 77
End: 2022-11-20

## 2022-12-09 ENCOUNTER — HOSPITAL ENCOUNTER (OUTPATIENT)
Dept: MAMMOGRAPHY | Facility: CLINIC | Age: 77
Discharge: HOME | End: 2022-12-09
Attending: FAMILY MEDICINE | Admitting: FAMILY MEDICINE
Payer: MEDICARE

## 2022-12-09 DIAGNOSIS — Z12.31 VISIT FOR SCREENING MAMMOGRAM: ICD-10-CM

## 2022-12-09 PROCEDURE — 77067 SCR MAMMO BI INCL CAD: CPT

## 2022-12-24 ENCOUNTER — HEALTH MAINTENANCE LETTER (OUTPATIENT)
Age: 77
End: 2022-12-24

## 2023-02-21 ENCOUNTER — TRANSFERRED RECORDS (OUTPATIENT)
Dept: HEALTH INFORMATION MANAGEMENT | Facility: CLINIC | Age: 78
End: 2023-02-21
Payer: MEDICARE

## 2023-02-21 LAB — RETINOPATHY: POSITIVE

## 2023-04-15 ENCOUNTER — HEALTH MAINTENANCE LETTER (OUTPATIENT)
Age: 78
End: 2023-04-15

## 2023-09-04 DIAGNOSIS — I10 BENIGN ESSENTIAL HYPERTENSION: ICD-10-CM

## 2023-09-05 NOTE — TELEPHONE ENCOUNTER
"Routing refill request to provider for review/approval because:  Patient needs to be seen because:  Annual exam      Requested Prescriptions   Pending Prescriptions Disp Refills    hydrochlorothiazide (HYDRODIURIL) 12.5 MG tablet [Pharmacy Med Name: hydrochlorothiazide 12.5 mg tablet] 90 tablet 3     Sig: Take 1 tablet (12.5 mg) by mouth daily       Diuretics (Including Combos) Protocol Passed - 9/4/2023  8:00 AM        Passed - Blood pressure under 140/90 in past 12 months     BP Readings from Last 3 Encounters:   09/07/22 128/82   10/26/21 (!) 148/72   06/11/21 (!) 142/76                 Passed - Recent (12 mo) or future (30 days) visit within the authorizing provider's specialty     Patient has had an office visit with the authorizing provider or a provider within the authorizing providers department within the previous 12 mos or has a future within next 30 days. See \"Patient Info\" tab in inbasket, or \"Choose Columns\" in Meds & Orders section of the refill encounter.              Passed - Medication is active on med list        Passed - Patient is age 18 or older        Passed - No active pregancy on record        Passed - Normal serum creatinine on file in past 12 months     Recent Labs   Lab Test 09/07/22  0933   CR 0.69              Passed - Normal serum potassium on file in past 12 months     Recent Labs   Lab Test 09/07/22  0933   POTASSIUM 4.4                    Passed - Normal serum sodium on file in past 12 months     Recent Labs   Lab Test 09/07/22  0933                 Passed - No positive pregnancy test in past 12 months                 Carlotta Fonseca RN 09/05/23 2:28 PM    "

## 2023-09-09 RX ORDER — HYDROCHLOROTHIAZIDE 12.5 MG/1
12.5 TABLET ORAL DAILY
Qty: 90 TABLET | Refills: 0 | Status: SHIPPED | OUTPATIENT
Start: 2023-09-09 | End: 2023-10-09

## 2023-09-22 DIAGNOSIS — E11.9 TYPE 2 DIABETES, HBA1C GOAL < 7% (H): ICD-10-CM

## 2023-09-25 RX ORDER — BLOOD SUGAR DIAGNOSTIC
STRIP MISCELLANEOUS
Qty: 200 STRIP | Refills: 1 | Status: SHIPPED | OUTPATIENT
Start: 2023-09-25

## 2023-09-28 DIAGNOSIS — E78.5 TYPE 2 DIABETES MELLITUS WITH HYPERLIPIDEMIA (H): ICD-10-CM

## 2023-09-28 DIAGNOSIS — E11.69 TYPE 2 DIABETES MELLITUS WITH HYPERLIPIDEMIA (H): ICD-10-CM

## 2023-09-29 RX ORDER — METFORMIN HCL 500 MG
1000 TABLET, EXTENDED RELEASE 24 HR ORAL 2 TIMES DAILY WITH MEALS
Qty: 360 TABLET | Refills: 0 | OUTPATIENT
Start: 2023-09-29

## 2023-10-02 ENCOUNTER — HOSPITAL ENCOUNTER (EMERGENCY)
Facility: CLINIC | Age: 78
Discharge: HOME | End: 2023-10-02
Attending: EMERGENCY MEDICINE | Admitting: EMERGENCY MEDICINE
Payer: MEDICARE

## 2023-10-02 VITALS
SYSTOLIC BLOOD PRESSURE: 136 MMHG | OXYGEN SATURATION: 95 % | HEART RATE: 68 BPM | TEMPERATURE: 97.2 F | RESPIRATION RATE: 15 BRPM | DIASTOLIC BLOOD PRESSURE: 65 MMHG

## 2023-10-02 DIAGNOSIS — H81.393 PERIPHERAL VERTIGO OF BOTH EARS: ICD-10-CM

## 2023-10-02 LAB
ALBUMIN SERPL BCG-MCNC: 4.2 G/DL (ref 3.5–5.2)
ALP SERPL-CCNC: 69 U/L (ref 35–104)
ALT SERPL W P-5'-P-CCNC: 15 U/L (ref 0–50)
ANION GAP SERPL CALCULATED.3IONS-SCNC: 16 MMOL/L (ref 7–15)
AST SERPL W P-5'-P-CCNC: 17 U/L (ref 0–45)
BASOPHILS # BLD AUTO: 0.1 10E3/UL (ref 0–0.2)
BASOPHILS NFR BLD AUTO: 1 %
BILIRUB SERPL-MCNC: 0.4 MG/DL
BUN SERPL-MCNC: 10.8 MG/DL (ref 8–23)
CALCIUM SERPL-MCNC: 9.7 MG/DL (ref 8.8–10.2)
CHLORIDE SERPL-SCNC: 95 MMOL/L (ref 98–107)
CREAT SERPL-MCNC: 0.52 MG/DL (ref 0.51–0.95)
DEPRECATED HCO3 PLAS-SCNC: 23 MMOL/L (ref 22–29)
EGFRCR SERPLBLD CKD-EPI 2021: >90 ML/MIN/1.73M2
EOSINOPHIL # BLD AUTO: 0.5 10E3/UL (ref 0–0.7)
EOSINOPHIL NFR BLD AUTO: 5 %
ERYTHROCYTE [DISTWIDTH] IN BLOOD BY AUTOMATED COUNT: 12.6 % (ref 10–15)
GLUCOSE SERPL-MCNC: 240 MG/DL (ref 70–99)
HCT VFR BLD AUTO: 38.6 % (ref 35–47)
HGB BLD-MCNC: 12.8 G/DL (ref 11.7–15.7)
HOLD SPECIMEN: NORMAL
IMM GRANULOCYTES # BLD: 0.1 10E3/UL
IMM GRANULOCYTES NFR BLD: 1 %
LYMPHOCYTES # BLD AUTO: 2 10E3/UL (ref 0.8–5.3)
LYMPHOCYTES NFR BLD AUTO: 19 %
MCH RBC QN AUTO: 28.8 PG (ref 26.5–33)
MCHC RBC AUTO-ENTMCNC: 33.2 G/DL (ref 31.5–36.5)
MCV RBC AUTO: 87 FL (ref 78–100)
MONOCYTES # BLD AUTO: 0.5 10E3/UL (ref 0–1.3)
MONOCYTES NFR BLD AUTO: 5 %
NEUTROPHILS # BLD AUTO: 7.4 10E3/UL (ref 1.6–8.3)
NEUTROPHILS NFR BLD AUTO: 69 %
NRBC # BLD AUTO: 0 10E3/UL
NRBC BLD AUTO-RTO: 0 /100
PLATELET # BLD AUTO: 281 10E3/UL (ref 150–450)
POTASSIUM SERPL-SCNC: 3.7 MMOL/L (ref 3.4–5.3)
PROT SERPL-MCNC: 7.1 G/DL (ref 6.4–8.3)
RBC # BLD AUTO: 4.45 10E6/UL (ref 3.8–5.2)
SODIUM SERPL-SCNC: 134 MMOL/L (ref 135–145)
WBC # BLD AUTO: 10.4 10E3/UL (ref 4–11)

## 2023-10-02 PROCEDURE — 85025 COMPLETE CBC W/AUTO DIFF WBC: CPT | Performed by: EMERGENCY MEDICINE

## 2023-10-02 PROCEDURE — 96375 TX/PRO/DX INJ NEW DRUG ADDON: CPT | Performed by: EMERGENCY MEDICINE

## 2023-10-02 PROCEDURE — 250N000011 HC RX IP 250 OP 636: Performed by: EMERGENCY MEDICINE

## 2023-10-02 PROCEDURE — 99284 EMERGENCY DEPT VISIT MOD MDM: CPT | Performed by: EMERGENCY MEDICINE

## 2023-10-02 PROCEDURE — 36415 COLL VENOUS BLD VENIPUNCTURE: CPT | Performed by: EMERGENCY MEDICINE

## 2023-10-02 PROCEDURE — 96376 TX/PRO/DX INJ SAME DRUG ADON: CPT | Performed by: EMERGENCY MEDICINE

## 2023-10-02 PROCEDURE — 80053 COMPREHEN METABOLIC PANEL: CPT | Performed by: EMERGENCY MEDICINE

## 2023-10-02 PROCEDURE — 99284 EMERGENCY DEPT VISIT MOD MDM: CPT | Mod: 25 | Performed by: EMERGENCY MEDICINE

## 2023-10-02 PROCEDURE — 96374 THER/PROPH/DIAG INJ IV PUSH: CPT | Performed by: EMERGENCY MEDICINE

## 2023-10-02 RX ORDER — DIAZEPAM 10 MG/2ML
2.5 INJECTION, SOLUTION INTRAMUSCULAR; INTRAVENOUS ONCE
Status: COMPLETED | OUTPATIENT
Start: 2023-10-02 | End: 2023-10-02

## 2023-10-02 RX ORDER — MECLIZINE HYDROCHLORIDE 25 MG/1
25 TABLET ORAL 3 TIMES DAILY PRN
Qty: 30 TABLET | Refills: 0 | Status: SHIPPED | OUTPATIENT
Start: 2023-10-02 | End: 2024-05-01

## 2023-10-02 RX ORDER — ONDANSETRON 2 MG/ML
4 INJECTION INTRAMUSCULAR; INTRAVENOUS ONCE
Status: COMPLETED | OUTPATIENT
Start: 2023-10-02 | End: 2023-10-02

## 2023-10-02 RX ORDER — ONDANSETRON 2 MG/ML
4 INJECTION INTRAMUSCULAR; INTRAVENOUS EVERY 30 MIN PRN
Status: DISCONTINUED | OUTPATIENT
Start: 2023-10-02 | End: 2023-10-02 | Stop reason: HOSPADM

## 2023-10-02 RX ADMIN — DIAZEPAM 2.5 MG: 10 INJECTION, SOLUTION INTRAMUSCULAR; INTRAVENOUS at 04:52

## 2023-10-02 RX ADMIN — DIAZEPAM 2.5 MG: 10 INJECTION, SOLUTION INTRAMUSCULAR; INTRAVENOUS at 05:47

## 2023-10-02 RX ADMIN — ONDANSETRON 4 MG: 2 INJECTION INTRAMUSCULAR; INTRAVENOUS at 04:15

## 2023-10-02 RX ADMIN — ONDANSETRON 4 MG: 2 INJECTION INTRAMUSCULAR; INTRAVENOUS at 04:53

## 2023-10-02 ASSESSMENT — ENCOUNTER SYMPTOMS
COUGH: 0
NAUSEA: 1
VOMITING: 1
NUMBNESS: 0
DIZZINESS: 1
FEVER: 0
WEAKNESS: 0
LIGHT-HEADEDNESS: 0
SHORTNESS OF BREATH: 0
APPETITE CHANGE: 0
HEADACHES: 1
SPEECH DIFFICULTY: 0
CHEST TIGHTNESS: 0
WOUND: 0

## 2023-10-02 ASSESSMENT — ACTIVITIES OF DAILY LIVING (ADL): ADLS_ACUITY_SCORE: 35

## 2023-10-02 NOTE — ED NOTES
Pt reports she is feeling better. Instructed to start slowly trying to sit up, one movement at a time.

## 2023-10-02 NOTE — ED TRIAGE NOTES
Pt woke at 0215 with a headache, dizziness and nausea\. Took tylenol and headache is currently gone but endorses nausea and dizziness. No focal weakness noticed     Triage Assessment       Row Name 10/02/23 0403       Triage Assessment (Adult)    Airway WDL WDL       Respiratory WDL    Respiratory WDL WDL       Skin Circulation/Temperature WDL    Skin Circulation/Temperature WDL WDL       Cardiac WDL    Cardiac WDL WDL       Peripheral/Neurovascular WDL    Peripheral Neurovascular WDL WDL       Cognitive/Neuro/Behavioral WDL    Cognitive/Neuro/Behavioral WDL WDL

## 2023-10-02 NOTE — ED PROVIDER NOTES
History     Chief Complaint   Patient presents with    Dizziness     HPI  Mackenzie Trinidad is a 78 year old female with history of diabetes presenting for evaluation of severe dizziness.  Woke up with severe spinning sensation followed by nausea.  Symptoms waxing waning for the past 2 or so hours before coming in.  Symptoms worsened when laying down and somewhat improved sitting up.  Patient is uncertain whether movement clearly contributed to her symptoms but while sitting here in the ED waiting for assessment, she does report resolution of her spinning sensation and nausea.  Patient reports a headache when she first woke up but this is gone away.  Denies any numbness, tingling, weakness.  No difficulty with speaking or swallowing.  No vision changes.  Went to bed feeling well.  Reportedly had vertigo 10 or 20 years ago but she does not believe it was anywhere near as severe as she experienced night.    Allergies:  Allergies   Allergen Reactions    Nkda [No Known Drug Allergy]        Problem List:    Patient Active Problem List    Diagnosis Date Noted    Ventral hernia 06/23/2018     Priority: Medium    Benign essential hypertension 11/28/2016     Priority: Medium    Type 2 diabetes mellitus with hyperlipidemia (H) 10/25/2015     Priority: Medium    Hyperlipidemia LDL goal <100 01/23/2012     Priority: Medium    Type 2 diabetes, HbA1c goal < 7% (H) 01/23/2012     Priority: Medium    Diverticulosis of large intestine 10/10/2007     Priority: Medium     Problem list name updated by automated process. Provider to review      FAMILY HISTORY OF OSTEOPORSIS 04/20/2005     Priority: Medium     Mother taking meds  Problem list name updated by automated process. Provider to review and confirm      FAMILY HISTORY OF COLON CANCER 04/20/2005     Priority: Medium     Colonoscopy done 1/04 and 10/07 (no polyps on last one, still with diverticuli)  NEEDS REPEAT IN 5 YEARS (1/12)      MYOPIA 06/15/2001     Priority: Medium       Problem: Pain  Goal: #Acceptable pain level achieved/maintained at rest using NRS/Faces  Description: This goal is used for patients who can self-report.  Acceptable means the level is at or below the identified comfort/function goal.  Outcome: Outcome Met, Continue evaluating goal progress toward completion     Problem: At Risk for Falls  Goal: # Patient does not fall  Outcome: Outcome Met, Continue evaluating goal progress toward completion     Problem: Impaired Physical Mobility  Goal: # Bed mobility, ambulation, and ADLs are maintained or returned to baseline during hospitalization  Outcome: Outcome Met, Continue evaluating goal progress toward completion      PRESBYOPIA 06/15/2001     Priority: Medium        Past Medical History:    Past Medical History:   Diagnosis Date    Benign essential hypertension     Diabetes (H)     HTN (hypertension)     Need for prophylactic hormone replacement therapy (postmenopausal)     PONV (postoperative nausea and vomiting)        Past Surgical History:    Past Surgical History:   Procedure Laterality Date    ARTHRODESIS FOOT Right 10/23/2014    Procedure: ARTHRODESIS FOOT;  Surgeon: Mauri Gomez DPM;  Location: [facility]    ARTHRODESIS FOOT Right 10/03/2019    Procedure: RIGHT 2ND METATARSAL PHALANGEAL JOINT REPAIR OF CAPSULE/STABILIZATION, 3RD TOE PROXIMAL INTERPHALANGEAL JOINT FUSION; 4TH TOE CORRECTION;  Surgeon: Mauri Gomez DPM;  Location: [facility]    BIOPSY BREAST      COLONOSCOPY  10/10/2007    Repeat in 5 years    COLONOSCOPY N/A 2018    Procedure: COLONOSCOPY;  Surgeon: Magno Huber MD;  Location: [facility]    LAPAROSCOPIC HERNIORRHAPHY VENTRAL N/A 2018    Procedure: LAPAROSCOPIC HERNIORRHAPHY VENTRAL;  LAPAROSCOPIC HERNIORRHAPHY VENTRAL ;  Surgeon: Jose Ibrahim MD;  Location: [facility]    LUMPECTOMY BREAST      SURGICAL HISTORY OF -       Hysterectomy    SURGICAL HISTORY OF -   2002    Right hammer toe/bunionectomy       Family History:    Family History   Problem Relation Age of Onset    Hypertension Mother     Osteoporosis Mother     Heart Disease Mother 94        small heart attack    Cancer Father         bone cancer    Hypertension Brother     Lipids Sister     Diabetes Sister         diet controlled    Cancer - colorectal Other 45        he  at age 45 from colon cancer    Hypertension Brother     Diabetes Brother     Lipids Brother     Cancer - colorectal Brother         in his 60s. tested negative genetic markers    Lipids Brother     Hypertension Sister     Asthma No family hx of     C.A.D. No family hx of     Cerebrovascular Disease No family hx of     Breast Cancer No family hx of      Prostate Cancer No family hx of        Social History:  Marital Status:   [2]  Social History     Tobacco Use    Smoking status: Former     Packs/day: 0.00     Years: 0.00     Pack years: 0.00     Types: Cigarettes     Quit date: 1971     Years since quittin.7    Smokeless tobacco: Never   Substance Use Topics    Alcohol use: Yes     Comment: social    Drug use: No        Medications:    ACCU-CHEK JULIO CÉSAR PLUS test strip  aspirin 81 MG tablet  CALCIUM 500 + D OR  FLAX SEED OIL OR  FREESTYLE LANCETS MISC  glimepiride (AMARYL) 1 MG tablet  hydrochlorothiazide (HYDRODIURIL) 12.5 MG tablet  losartan (COZAAR) 100 MG tablet  metFORMIN (GLUCOPHAGE XR) 500 MG 24 hr tablet  pravastatin (PRAVACHOL) 40 MG tablet          Review of Systems   Constitutional:  Negative for appetite change and fever.   Respiratory:  Negative for cough, chest tightness and shortness of breath.    Cardiovascular:  Negative for chest pain.   Gastrointestinal:  Positive for nausea and vomiting.   Genitourinary:  Negative for decreased urine volume.   Skin:  Negative for wound.   Neurological:  Positive for dizziness (Vertigo) and headaches (Resolved). Negative for speech difficulty, weakness, light-headedness and numbness.   All other systems reviewed and are negative.      Physical Exam   BP: (!) 217/88  Pulse: 81  Temp: 97.2  F (36.2  C)  Resp: 20  SpO2: 95 %      Physical Exam  Vitals and nursing note reviewed.   Constitutional:       Appearance: Normal appearance. She is obese. She is not ill-appearing or diaphoretic.   HENT:      Head: Atraumatic.   Eyes:      Conjunctiva/sclera: Conjunctivae normal.   Cardiovascular:      Rate and Rhythm: Normal rate.      Pulses: Normal pulses.   Pulmonary:      Effort: Pulmonary effort is normal.   Abdominal:      Palpations: Abdomen is soft.      Tenderness: There is no abdominal tenderness.   Musculoskeletal:         General: Normal range of motion.   Skin:     General: Skin is warm and dry.       Capillary Refill: Capillary refill takes less than 2 seconds.   Neurological:      Mental Status: She is alert and oriented to person, place, and time.      Sensory: No sensory deficit.      Motor: No weakness.      Comments: Positive [place]-Hallpike bilaterally reproducing her nausea with a nystagmus.  Symptoms relatively mild to the right and severe to the left.  Attempted Epley but unable to tolerate due to severe nausea and vertigo.   Psychiatric:         Mood and Affect: Mood normal.         ED Course                 Procedures           Results for orders placed or performed during the hospital encounter of 10/02/23 (from the past 24 hour(s))   Comprehensive metabolic panel   Result Value Ref Range    Sodium 134 (L) 135 - 145 mmol/L    Potassium 3.7 3.4 - 5.3 mmol/L    Carbon Dioxide (CO2) 23 22 - 29 mmol/L    Anion Gap 16 (H) 7 - 15 mmol/L    Urea Nitrogen 10.8 8.0 - 23.0 mg/dL    Creatinine 0.52 0.51 - 0.95 mg/dL    GFR Estimate >90 >60 mL/min/1.73m2    Calcium 9.7 8.8 - 10.2 mg/dL    Chloride 95 (L) 98 - 107 mmol/L    Glucose 240 (H) 70 - 99 mg/dL    Alkaline Phosphatase 69 35 - 104 U/L    AST 17 0 - 45 U/L    ALT 15 0 - 50 U/L    Protein Total 7.1 6.4 - 8.3 g/dL    Albumin 4.2 3.5 - 5.2 g/dL    Bilirubin Total 0.4 <=1.2 mg/dL   CBC with platelets, differential    Narrative    The following orders were created for panel order CBC with platelets, differential.  Procedure                               Abnormality         Status                     ---------                               -----------         ------                     CBC with platelets and d...[[id number]]                      Final result                 Please view results for these tests on the individual orders.   [place] Draw    Narrative    The following orders were created for panel order [place] Draw.  Procedure                               Abnormality         Status                     ---------                                -----------         ------                     Extra Blue Top Tube[[id number]]                              In process                   Please view results for these tests on the individual orders.   CBC with platelets and differential   Result Value Ref Range    WBC Count 10.4 4.0 - 11.0 10e3/uL    RBC Count 4.45 3.80 - 5.20 10e6/uL    Hemoglobin 12.8 11.7 - 15.7 g/dL    Hematocrit 38.6 35.0 - 47.0 %    MCV 87 78 - 100 fL    MCH 28.8 26.5 - 33.0 pg    MCHC 33.2 31.5 - 36.5 g/dL    RDW 12.6 10.0 - 15.0 %    Platelet Count 281 150 - 450 10e3/uL    % Neutrophils 69 %    % Lymphocytes 19 %    % Monocytes 5 %    % Eosinophils 5 %    % Basophils 1 %    % Immature Granulocytes 1 %    NRBCs per 100 WBC 0 <1 /100    Absolute Neutrophils 7.4 1.6 - 8.3 10e3/uL    Absolute Lymphocytes 2.0 0.8 - 5.3 10e3/uL    Absolute Monocytes 0.5 0.0 - 1.3 10e3/uL    Absolute Eosinophils 0.5 0.0 - 0.7 10e3/uL    Absolute Basophils 0.1 0.0 - 0.2 10e3/uL    Absolute Immature Granulocytes 0.1 <=0.4 10e3/uL    Absolute NRBCs 0.0 10e3/uL       Medications   ondansetron (ZOFRAN) injection 4 mg (4 mg Intravenous $Given 10/2/23 1412)   ondansetron (ZOFRAN) injection 4 mg (4 mg Intravenous $Given 10/2/23 7716)   diazepam (VALIUM) injection 2.5 mg (2.5 mg Intravenous $Given 10/2/23 6272)     5:25 AM Patient re-assessed: Resting complete.  Vertigo has again resolved.  Discussed plan for symptomatic treatment.    Assessments & Plan (with Medical Decision Making)  78-year-old presented for evaluation of acute vertigo.  Woke up with severe spinning sensation with nausea and vomiting.  Symptoms reproducible with Sonya-Hallpike maneuver here.  Symptoms subsided with Zofran and diazepam.  Counseled patient on the Epley maneuver and prescribed meclizine for home use for recurrent symptoms.     I have reviewed the nursing notes.    I have reviewed the findings, diagnosis, plan and need for follow up with the patient.        New Prescriptions    No medications on  file       Final diagnoses:   Peripheral vertigo of both ears       10/2/2023   [facility] EMERGENCY DEPT       Diggs, Tao Kiran MD  10/02/23 0590

## 2023-10-02 NOTE — ED PROVIDER NOTES
Emergency Department Patient Sign-out       Brief HPI:  This is a 78 year old female signed out to me by Dr. Diggs .  See initial ED Provider note for details of the presentation.     Presents with triggered vertigo.      Significant Events prior to my assuming care:       Exam:   Patient Vitals for the past 24 hrs:   BP Temp Temp src Pulse Resp SpO2   10/02/23 0529 -- -- -- 71 18 97 %   10/02/23 0516 -- -- -- 69 15 97 %   10/02/23 0514 -- -- -- 69 14 96 %   10/02/23 0501 (!) 155/74 -- -- 72 20 94 %   10/02/23 0459 -- -- -- 71 19 94 %   10/02/23 0446 -- -- -- 77 24 97 %   10/02/23 0444 -- -- -- 75 21 91 %   10/02/23 0431 -- -- -- 71 17 92 %   10/02/23 0430 (!) 165/66 -- -- 69 18 92 %   10/02/23 0416 -- -- -- 70 10 93 %   10/02/23 0411 (!) 210/113 -- -- 80 -- --   10/02/23 0402 (!) 217/88 97.2  F (36.2  C) Tympanic 81 20 95 %           ED RESULTS:   Results for orders placed or performed during the hospital encounter of 10/02/23 (from the past 24 hour(s))   Comprehensive metabolic panel     Status: Abnormal    Collection Time: 10/02/23  4:12 AM   Result Value Ref Range    Sodium 134 (L) 135 - 145 mmol/L    Potassium 3.7 3.4 - 5.3 mmol/L    Carbon Dioxide (CO2) 23 22 - 29 mmol/L    Anion Gap 16 (H) 7 - 15 mmol/L    Urea Nitrogen 10.8 8.0 - 23.0 mg/dL    Creatinine 0.52 0.51 - 0.95 mg/dL    GFR Estimate >90 >60 mL/min/1.73m2    Calcium 9.7 8.8 - 10.2 mg/dL    Chloride 95 (L) 98 - 107 mmol/L    Glucose 240 (H) 70 - 99 mg/dL    Alkaline Phosphatase 69 35 - 104 U/L    AST 17 0 - 45 U/L    ALT 15 0 - 50 U/L    Protein Total 7.1 6.4 - 8.3 g/dL    Albumin 4.2 3.5 - 5.2 g/dL    Bilirubin Total 0.4 <=1.2 mg/dL   CBC with platelets, differential     Status: None    Collection Time: 10/02/23  4:12 AM    Narrative    The following orders were created for panel order CBC with platelets, differential.  Procedure                               Abnormality         Status                     ---------                                -----------         ------                     CBC with platelets and d...[[id number]]                      Final result                 Please view results for these tests on the individual orders.   [place] Draw     Status: None    Collection Time: 10/02/23  4:12 AM    Narrative    The following orders were created for panel order [place] Draw.  Procedure                               Abnormality         Status                     ---------                               -----------         ------                     Extra Blue Top Tube[[id number]]                              Final result                 Please view results for these tests on the individual orders.   CBC with platelets and differential     Status: None    Collection Time: 10/02/23  4:12 AM   Result Value Ref Range    WBC Count 10.4 4.0 - 11.0 10e3/uL    RBC Count 4.45 3.80 - 5.20 10e6/uL    Hemoglobin 12.8 11.7 - 15.7 g/dL    Hematocrit 38.6 35.0 - 47.0 %    MCV 87 78 - 100 fL    MCH 28.8 26.5 - 33.0 pg    MCHC 33.2 31.5 - 36.5 g/dL    RDW 12.6 10.0 - 15.0 %    Platelet Count 281 150 - 450 10e3/uL    % Neutrophils 69 %    % Lymphocytes 19 %    % Monocytes 5 %    % Eosinophils 5 %    % Basophils 1 %    % Immature Granulocytes 1 %    NRBCs per 100 WBC 0 <1 /100    Absolute Neutrophils 7.4 1.6 - 8.3 10e3/uL    Absolute Lymphocytes 2.0 0.8 - 5.3 10e3/uL    Absolute Monocytes 0.5 0.0 - 1.3 10e3/uL    Absolute Eosinophils 0.5 0.0 - 0.7 10e3/uL    Absolute Basophils 0.1 0.0 - 0.2 10e3/uL    Absolute Immature Granulocytes 0.1 <=0.4 10e3/uL    Absolute NRBCs 0.0 10e3/uL   Extra Blue Top Tube     Status: None    Collection Time: 10/02/23  4:12 AM   Result Value Ref Range    Hold Specimen [facility]        ED MEDICATIONS:   Medications   ondansetron (ZOFRAN) injection 4 mg (4 mg Intravenous $Given 10/2/23 5967)   ondansetron (ZOFRAN) injection 4 mg (4 mg Intravenous $Given 10/2/23 1437)   diazepam (VALIUM) injection 2.5 mg (2.5 mg Intravenous $Given 10/2/23 8518)    diazepam (VALIUM) injection 2.5 mg (2.5 mg Intravenous $Given 10/2/23 5613)         Impression:    ICD-10-CM    1. Peripheral vertigo of both ears  H81.393           Plan:    Pending studies include none  .      discharging home  MD Abelino Márquez Scott J, MD  10/02/23 8764

## 2023-10-02 NOTE — TELEPHONE ENCOUNTER
Pt will call back to schedule.   Talked to spouse, they were just in ER, so they will be also doing a follow up for that possibly during next visit...

## 2023-10-02 NOTE — ED NOTES
Writer went to discharge pt. Pt started vomiting again. MD notified, will try one more dose of medications prior to discharge.

## 2023-10-04 RX ORDER — METFORMIN HCL 500 MG
1000 TABLET, EXTENDED RELEASE 24 HR ORAL 2 TIMES DAILY WITH MEALS
Qty: 120 TABLET | Refills: 0 | Status: SHIPPED | OUTPATIENT
Start: 2023-10-04 | End: 2023-10-09

## 2023-10-09 ENCOUNTER — OFFICE VISIT (OUTPATIENT)
Dept: FAMILY MEDICINE | Facility: CLINIC | Age: 78
End: 2023-10-09
Payer: MEDICARE

## 2023-10-09 VITALS
TEMPERATURE: 98.6 F | HEART RATE: 78 BPM | HEIGHT: 64 IN | SYSTOLIC BLOOD PRESSURE: 138 MMHG | WEIGHT: 176 LBS | BODY MASS INDEX: 30.05 KG/M2 | DIASTOLIC BLOOD PRESSURE: 82 MMHG | OXYGEN SATURATION: 99 %

## 2023-10-09 DIAGNOSIS — E78.5 HYPERLIPIDEMIA LDL GOAL <100: ICD-10-CM

## 2023-10-09 DIAGNOSIS — Z78.0 POST-MENOPAUSAL: ICD-10-CM

## 2023-10-09 DIAGNOSIS — F43.0 ACUTE REACTION TO STRESS: ICD-10-CM

## 2023-10-09 DIAGNOSIS — M25.562 CHRONIC PAIN OF LEFT KNEE: ICD-10-CM

## 2023-10-09 DIAGNOSIS — G89.29 CHRONIC PAIN OF LEFT KNEE: ICD-10-CM

## 2023-10-09 DIAGNOSIS — E78.5 TYPE 2 DIABETES MELLITUS WITH HYPERLIPIDEMIA (H): Primary | ICD-10-CM

## 2023-10-09 DIAGNOSIS — E11.9 TYPE 2 DIABETES, HBA1C GOAL < 7% (H): ICD-10-CM

## 2023-10-09 DIAGNOSIS — I10 BENIGN ESSENTIAL HYPERTENSION: ICD-10-CM

## 2023-10-09 DIAGNOSIS — E11.69 TYPE 2 DIABETES MELLITUS WITH HYPERLIPIDEMIA (H): Primary | ICD-10-CM

## 2023-10-09 DIAGNOSIS — H81.13 BENIGN PAROXYSMAL POSITIONAL VERTIGO DUE TO BILATERAL VESTIBULAR DISORDER: ICD-10-CM

## 2023-10-09 LAB — HBA1C MFR BLD: 7.7 % (ref 0–5.6)

## 2023-10-09 PROCEDURE — 83036 HEMOGLOBIN GLYCOSYLATED A1C: CPT | Performed by: FAMILY MEDICINE

## 2023-10-09 PROCEDURE — 99207 PR FOOT EXAM NO CHARGE: CPT | Performed by: FAMILY MEDICINE

## 2023-10-09 PROCEDURE — 36415 COLL VENOUS BLD VENIPUNCTURE: CPT | Performed by: FAMILY MEDICINE

## 2023-10-09 PROCEDURE — 99214 OFFICE O/P EST MOD 30 MIN: CPT | Performed by: FAMILY MEDICINE

## 2023-10-09 RX ORDER — PRAVASTATIN SODIUM 40 MG
40 TABLET ORAL DAILY
Qty: 90 TABLET | Refills: 1 | Status: SHIPPED | OUTPATIENT
Start: 2023-10-09 | End: 2024-04-17

## 2023-10-09 RX ORDER — METFORMIN HCL 500 MG
1000 TABLET, EXTENDED RELEASE 24 HR ORAL 2 TIMES DAILY WITH MEALS
Qty: 120 TABLET | Refills: 1 | Status: SHIPPED | OUTPATIENT
Start: 2023-10-09 | End: 2024-01-30

## 2023-10-09 RX ORDER — GLIMEPIRIDE 1 MG/1
1 TABLET ORAL
Qty: 90 TABLET | Refills: 1 | Status: SHIPPED | OUTPATIENT
Start: 2023-10-09 | End: 2024-05-01

## 2023-10-09 RX ORDER — LOSARTAN POTASSIUM 100 MG/1
100 TABLET ORAL DAILY
Qty: 90 TABLET | Refills: 3 | Status: SHIPPED | OUTPATIENT
Start: 2023-10-09

## 2023-10-09 RX ORDER — HYDROCHLOROTHIAZIDE 12.5 MG/1
12.5 TABLET ORAL DAILY
Qty: 90 TABLET | Refills: 1 | Status: SHIPPED | OUTPATIENT
Start: 2023-10-09 | End: 2024-05-01

## 2023-10-09 NOTE — PROGRESS NOTES
"  Assessment & Plan     Type 2 diabetes mellitus with hyperlipidemia (H)    - HEMOGLOBIN A1C; Future  - Lipid panel reflex to direct LDL Non-fasting; Future  - Albumin Random Urine Quantitative with Creat Ratio; Future  - FOOT EXAM  - metFORMIN (GLUCOPHAGE XR) 500 MG 24 hr tablet; Take 2 tablets (1,000 mg) by mouth 2 times daily (with meals) Follow up visit in april  - HEMOGLOBIN A1C    Type 2 diabetes, HbA1c goal < 7% (H)  Well controlled   - FOOT EXAM  - glimepiride (AMARYL) 1 MG tablet; Take 1 tablet (1 mg) by mouth every morning (before breakfast) Follow up visit 4/24    Benign essential hypertension  Controlled   - hydrochlorothiazide (HYDRODIURIL) 12.5 MG tablet; Take 1 tablet (12.5 mg) by mouth daily Follow up visit 4/24  - losartan (COZAAR) 100 MG tablet; Take 1 tablet (100 mg) by mouth daily    Hyperlipidemia LDL goal <100  Checking   - pravastatin (PRAVACHOL) 40 MG tablet; Take 1 tablet (40 mg) by mouth daily  - Lipid panel reflex to direct LDL Fasting; Future    Benign paroxysmal positional vertigo due to bilateral vestibular disorder  If recurs recommend that she follow up with therm   - Physical Therapy Referral; Future    Post-menopausal    - DEXA HIP/PELVIS/SPINE - Future; Future    Chronic pain of left knee  Hurting for the last 6 weeks or so. It has improvied some but it is still [resent   - Physical Therapy Referral; Future    Acute reaction to stress  Having some conflict with her daughter and she would like to see a therapist   - Adult Mental Health  Referral; Future     MED REC REQUIRED  Post Medication Reconciliation Status: discharge medications reconciled, continue medications without change  BMI:   Estimated body mass index is 30.21 kg/m  as calculated from the following:    Height as of this encounter: 1.626 m (5' 4\").    Weight as of this encounter: 79.8 kg (176 lb).       FUTURE APPOINTMENTS:       - Follow-up visit in 6 months     Dorota Art MD  [facility] " ELIDA Mann is a 78 year old, presenting for the following health issues:  ER F/U        10/9/2023     9:47 AM   Additional Questions   Roomed by Lay SANCHES CMA       [facility]       ED/UC Followup:  Facility:  [facility] ED  Date of visit: 10/2/23  Reason for visit: Peripheral vertigo of both ears.  Current Status: Doing better the last 2 days.     Had severe vertigo last week was seen in ed and exam c/w bppv     Diabetes Follow-up  How often are you checking your blood sugar? One time daily  What time of day are you checking your blood sugars (select all that apply)?  Before meals  Have you had any blood sugars above 200?  No  Have you had any blood sugars below 70?  No  What symptoms do you notice when your blood sugar is low?  None  What concerns do you have today about your diabetes? None   Do you have any of these symptoms? (Select all that apply)  No numbness or tingling in feet.  No redness, sores or blisters on feet.  No complaints of excessive thirst.  No reports of blurry vision.  No significant changes to weight.        Hyperlipidemia Follow-Up  Are you regularly taking any medication or supplement to lower your cholesterol?   Yes-    Are you having muscle aches or other side effects that you think could be caused by your cholesterol lowering medication?  No    Hypertension Follow-up  Do you check your blood pressure regularly outside of the clinic? No   Are you following a low salt diet? Yes  Are your blood pressures ever more than 140 on the top number (systolic) OR more   than 90 on the bottom number (diastolic), for example 140/90? No    BP Readings from Last 2 Encounters:   10/02/23 136/65   09/07/22 128/82     Hemoglobin A1C (%)   Date Value   09/07/2022 7.5 (H)   10/26/2021 7.3 (H)   02/04/2021 7.3 (H)   10/12/2020 8.3 (H)     LDL Cholesterol Calculated (mg/dL)   Date Value   09/07/2022 84   10/26/2021 54   10/12/2020 66   08/22/2019 74     Results for orders placed or  "performed in visit on 10/09/23   HEMOGLOBIN A1C     Status: Abnormal   Result Value Ref Range    Hemoglobin A1C 7.7 (H) 0.0 - 5.6 %   She is having a hard time with her mental health her daughter has accused her of not being a good person . She also thinks she is not a good grandparent she would like to see a therapist       She is better now   Blood sugar 120-160 in the am   She has not had any over 200 she checks in the am only     Review of Systems   Constitutional, HEENT, cardiovascular, pulmonary, gi and gu systems are negative, except as otherwise noted.    She does have some left knee pain she went [place] and did a lot of walking and she has some pain she has longer walks hurt more has not needed to use a cane does not buckle but does ache she does take tylenol on the outside of the knee cap   Objective    /82 (BP Location: Right arm, Patient Position: Sitting, Cuff Size: Adult Regular)   Pulse 78   Temp 98.6  F (37  C) (Tympanic)   Ht 1.626 m (5' 4\")   Wt 79.8 kg (176 lb)   SpO2 99%   BMI 30.21 kg/m    Body mass index is 30.21 kg/m .  Physical Exam   GENERAL: healthy, alert and no distress  EYES: Eyes grossly normal to inspection, PERRL and conjunctivae and sclerae normal  HENT: ear canals and TM's normal, nose and mouth without ulcers or lesions  NECK: no adenopathy, no asymmetry, masses, or scars and thyroid normal to palpation  RESP: lungs clear to auscultation - no rales, rhonchi or wheezes  BREAST: normal without masses, tenderness or nipple discharge and no palpable axillary masses or adenopathy  CV: regular rate and rhythm, normal S1 S2, no S3 or S4, no murmur, click or rub, no peripheral edema and peripheral pulses strong  MS: no gross musculoskeletal defects noted, no edema  SKIN: no suspicious lesions or rashes  NEURO: Normal strength and tone, mentation intact and speech normal  PSYCH: mentation appears normal, affect normal/bright  Diabetic foot exam: normal DP and PT pulses, no " trophic changes or ulcerative lesions, and normal sensory exam    Results for orders placed or performed in visit on 10/09/23 (from the past 24 hour(s))   HEMOGLOBIN A1C   Result Value Ref Range    Hemoglobin A1C 7.7 (H) 0.0 - 5.6 %       Dorota Art M.D.

## 2023-10-19 ENCOUNTER — HOSPITAL ENCOUNTER (OUTPATIENT)
Dept: BONE DENSITY | Facility: CLINIC | Age: 78
Discharge: HOME | End: 2023-10-19
Attending: FAMILY MEDICINE | Admitting: FAMILY MEDICINE
Payer: MEDICARE

## 2023-10-19 DIAGNOSIS — Z78.0 POST-MENOPAUSAL: ICD-10-CM

## 2023-10-19 PROCEDURE — 77080 DXA BONE DENSITY AXIAL: CPT

## 2023-10-31 ENCOUNTER — VIRTUAL VISIT (OUTPATIENT)
Dept: FAMILY MEDICINE | Facility: CLINIC | Age: 78
End: 2023-10-31
Payer: MEDICARE

## 2023-10-31 DIAGNOSIS — M85.851 OSTEOPENIA OF BOTH HIPS: Primary | ICD-10-CM

## 2023-10-31 DIAGNOSIS — M85.852 OSTEOPENIA OF BOTH HIPS: Primary | ICD-10-CM

## 2023-10-31 PROCEDURE — 99441 PR PHYSICIAN TELEPHONE EVALUATION 5-10 MIN: CPT | Mod: 95 | Performed by: FAMILY MEDICINE

## 2023-10-31 RX ORDER — IBANDRONATE SODIUM 150 MG/1
150 TABLET, FILM COATED ORAL
Qty: 3 TABLET | Refills: 4 | Status: SHIPPED | OUTPATIENT
Start: 2023-10-31 | End: 2024-10-04

## 2023-10-31 NOTE — PROGRESS NOTES
"      Mackenzie Trinidad is a 78 year old female who is being evaluated via a billable telephone visit.      What phone number would you like to be contacted at? [phone]   How would you like to obtain your AVS? MyChart      Assessment & Plan     Osteopenia of both hips  Discussed side effects risks and benefits. She has a predicted hip fx rate over 3% and it is recommended that she start on a bisphosphonate she does not have any contraindications; repeat dexa 1-2 years   - IBANdronate (BONIVA) 150 MG tablet; Take 1 tablet (150 mg) by mouth every 30 days       BMI:   Estimated body mass index is 30.21 kg/m  as calculated from the following:    Height as of 10/9/23: 1.626 m (5' 4\").    Weight as of 10/9/23: 79.8 kg (176 lb).       If side effects please let us know     Dorota Art MD  [facility]     Mackenzie Trinidad is a 78 year old female who presents via phone visit today for the following health issues:    *Discuss recent Dexa Results.   She is at increased rixk of hip fx overall her risk is only 13% but the hips had more bone loss   No history of  bone/jaw issues         Review of Systems   Constitutional, HEENT, cardiovascular, pulmonary, gi and gu systems are negative, except as otherwise noted.       Objective          Vitals:  No vitals were obtained today due to virtual visit.    healthy, alert, and no distress  PSYCH: Alert and oriented times 3; coherent speech, normal   rate and volume, able to articulate logical thoughts, able   to abstract reason, no tangential thoughts, no hallucinations   or delusions  Her affect is normal  RESP: No cough, no audible wheezing, able to talk in full sentences  Remainder of exam unable to be completed due to telephone visits    Office Visit on 10/09/2023   Component Date Value Ref Range Status    Hemoglobin A1C 10/09/2023 7.7 (H)  0.0 - 5.6 % Final    Normal <5.7%   Prediabetes 5.7-6.4%    Diabetes 6.5% or higher     Note: " Adopted from ADA consensus guidelines.           Phone call duration:  10 minutes spent on the day of service with chart review, telephone call, counseling, and charting. Doorta Art M.D.   minutes

## 2023-11-09 ENCOUNTER — PATIENT OUTREACH (OUTPATIENT)
Dept: CARE COORDINATION | Facility: CLINIC | Age: 78
End: 2023-11-09
Payer: MEDICARE

## 2023-12-01 DIAGNOSIS — E78.5 TYPE 2 DIABETES MELLITUS WITH HYPERLIPIDEMIA (H): ICD-10-CM

## 2023-12-01 DIAGNOSIS — E11.69 TYPE 2 DIABETES MELLITUS WITH HYPERLIPIDEMIA (H): ICD-10-CM

## 2023-12-01 RX ORDER — METFORMIN HCL 500 MG
1000 TABLET, EXTENDED RELEASE 24 HR ORAL 2 TIMES DAILY WITH MEALS
Qty: 120 TABLET | Refills: 1 | OUTPATIENT
Start: 2023-12-01

## 2023-12-07 ENCOUNTER — PATIENT OUTREACH (OUTPATIENT)
Dept: CARE COORDINATION | Facility: CLINIC | Age: 78
End: 2023-12-07
Payer: MEDICARE

## 2024-01-10 DIAGNOSIS — E78.5 HYPERLIPIDEMIA LDL GOAL <100: ICD-10-CM

## 2024-01-12 RX ORDER — PRAVASTATIN SODIUM 40 MG
40 TABLET ORAL DAILY
Qty: 90 TABLET | Refills: 1 | OUTPATIENT
Start: 2024-01-12

## 2024-01-19 DIAGNOSIS — I10 BENIGN ESSENTIAL HYPERTENSION: ICD-10-CM

## 2024-01-19 DIAGNOSIS — E11.9 TYPE 2 DIABETES, HBA1C GOAL < 7% (H): ICD-10-CM

## 2024-01-19 RX ORDER — GLIMEPIRIDE 1 MG/1
1 TABLET ORAL
Qty: 90 TABLET | Refills: 1 | OUTPATIENT
Start: 2024-01-19

## 2024-01-19 RX ORDER — HYDROCHLOROTHIAZIDE 12.5 MG/1
12.5 TABLET ORAL DAILY
Qty: 90 TABLET | Refills: 1 | OUTPATIENT
Start: 2024-01-19

## 2024-01-30 DIAGNOSIS — E11.69 TYPE 2 DIABETES MELLITUS WITH HYPERLIPIDEMIA (H): ICD-10-CM

## 2024-01-30 DIAGNOSIS — E78.5 TYPE 2 DIABETES MELLITUS WITH HYPERLIPIDEMIA (H): ICD-10-CM

## 2024-01-30 RX ORDER — METFORMIN HCL 500 MG
1000 TABLET, EXTENDED RELEASE 24 HR ORAL 2 TIMES DAILY WITH MEALS
Qty: 360 TABLET | Refills: 0 | Status: SHIPPED | OUTPATIENT
Start: 2024-01-30 | End: 2024-05-01

## 2024-02-03 ENCOUNTER — HEALTH MAINTENANCE LETTER (OUTPATIENT)
Age: 79
End: 2024-02-03

## 2024-03-28 ENCOUNTER — TRANSFERRED RECORDS (OUTPATIENT)
Dept: MULTI SPECIALTY CLINIC | Facility: CLINIC | Age: 79
End: 2024-03-28

## 2024-03-28 LAB — RETINOPATHY: NORMAL

## 2024-04-13 ENCOUNTER — HEALTH MAINTENANCE LETTER (OUTPATIENT)
Age: 79
End: 2024-04-13

## 2024-04-17 ENCOUNTER — TELEPHONE (OUTPATIENT)
Dept: FAMILY MEDICINE | Facility: CLINIC | Age: 79
End: 2024-04-17
Payer: MEDICARE

## 2024-04-17 DIAGNOSIS — E78.5 HYPERLIPIDEMIA LDL GOAL <100: ICD-10-CM

## 2024-04-17 RX ORDER — PRAVASTATIN SODIUM 40 MG
40 TABLET ORAL DAILY
Qty: 90 TABLET | Refills: 0 | Status: SHIPPED | OUTPATIENT
Start: 2024-04-17 | End: 2024-05-01

## 2024-04-17 NOTE — TELEPHONE ENCOUNTER
Received call from Patient.  Patient needs refill on pravastatin sent to [facility].  Patient has appointment with Dr Art on 5/1/24 at 1:10 pm.  Patient would like fasting labs drawn the the day before due to appointment with Dr Art being so late.  Please order labs for Patient.    Will refill medication  Gilberto Mejia RN

## 2024-04-30 ENCOUNTER — LAB (OUTPATIENT)
Dept: LAB | Facility: CLINIC | Age: 79
End: 2024-04-30
Payer: MEDICARE

## 2024-04-30 DIAGNOSIS — E11.69 TYPE 2 DIABETES MELLITUS WITH HYPERLIPIDEMIA (H): Primary | ICD-10-CM

## 2024-04-30 DIAGNOSIS — E78.5 HYPERLIPIDEMIA LDL GOAL <100: ICD-10-CM

## 2024-04-30 DIAGNOSIS — E78.5 TYPE 2 DIABETES MELLITUS WITH HYPERLIPIDEMIA (H): Primary | ICD-10-CM

## 2024-04-30 LAB
CHOLEST SERPL-MCNC: 199 MG/DL
CREAT UR-MCNC: 130.3 MG/DL
FASTING STATUS PATIENT QL REPORTED: YES
HBA1C MFR BLD: 7.7 % (ref 0–5.6)
HDLC SERPL-MCNC: 78 MG/DL
LDLC SERPL CALC-MCNC: 102 MG/DL
MICROALBUMIN UR-MCNC: 25 MG/L
MICROALBUMIN/CREAT UR: 19.19 MG/G CR (ref 0–25)
NONHDLC SERPL-MCNC: 121 MG/DL
TRIGL SERPL-MCNC: 96 MG/DL

## 2024-04-30 PROCEDURE — 36415 COLL VENOUS BLD VENIPUNCTURE: CPT

## 2024-04-30 PROCEDURE — 83036 HEMOGLOBIN GLYCOSYLATED A1C: CPT

## 2024-04-30 PROCEDURE — 82043 UR ALBUMIN QUANTITATIVE: CPT

## 2024-04-30 PROCEDURE — 80061 LIPID PANEL: CPT

## 2024-04-30 PROCEDURE — 82570 ASSAY OF URINE CREATININE: CPT

## 2024-05-01 ENCOUNTER — OFFICE VISIT (OUTPATIENT)
Dept: FAMILY MEDICINE | Facility: CLINIC | Age: 79
End: 2024-05-01
Payer: MEDICARE

## 2024-05-01 VITALS
OXYGEN SATURATION: 98 % | HEART RATE: 72 BPM | SYSTOLIC BLOOD PRESSURE: 148 MMHG | DIASTOLIC BLOOD PRESSURE: 96 MMHG | HEIGHT: 64 IN | WEIGHT: 179 LBS | TEMPERATURE: 98.7 F | BODY MASS INDEX: 30.56 KG/M2

## 2024-05-01 DIAGNOSIS — H25.013 CORTICAL AGE-RELATED CATARACT OF BOTH EYES: ICD-10-CM

## 2024-05-01 DIAGNOSIS — Z01.818 PREOP GENERAL PHYSICAL EXAM: Primary | ICD-10-CM

## 2024-05-01 DIAGNOSIS — Z13.220 SCREENING FOR CHOLESTEROL LEVEL: ICD-10-CM

## 2024-05-01 DIAGNOSIS — E78.5 HYPERLIPIDEMIA LDL GOAL <100: ICD-10-CM

## 2024-05-01 DIAGNOSIS — E11.9 TYPE 2 DIABETES, HBA1C GOAL < 7% (H): ICD-10-CM

## 2024-05-01 DIAGNOSIS — E11.69 TYPE 2 DIABETES MELLITUS WITH HYPERLIPIDEMIA (H): ICD-10-CM

## 2024-05-01 DIAGNOSIS — Z12.31 ENCOUNTER FOR SCREENING MAMMOGRAM FOR MALIGNANT NEOPLASM OF BREAST: ICD-10-CM

## 2024-05-01 DIAGNOSIS — I10 BENIGN ESSENTIAL HYPERTENSION: ICD-10-CM

## 2024-05-01 DIAGNOSIS — E78.5 TYPE 2 DIABETES MELLITUS WITH HYPERLIPIDEMIA (H): ICD-10-CM

## 2024-05-01 PROCEDURE — 99213 OFFICE O/P EST LOW 20 MIN: CPT | Performed by: FAMILY MEDICINE

## 2024-05-01 RX ORDER — PRAVASTATIN SODIUM 40 MG
40 TABLET ORAL DAILY
Qty: 90 TABLET | Refills: 3 | Status: SHIPPED | OUTPATIENT
Start: 2024-05-01

## 2024-05-01 RX ORDER — GLIMEPIRIDE 1 MG/1
1 TABLET ORAL
Qty: 90 TABLET | Refills: 3 | Status: SHIPPED | OUTPATIENT
Start: 2024-05-01

## 2024-05-01 RX ORDER — METFORMIN HCL 500 MG
1000 TABLET, EXTENDED RELEASE 24 HR ORAL 2 TIMES DAILY WITH MEALS
Qty: 360 TABLET | Refills: 3 | Status: SHIPPED | OUTPATIENT
Start: 2024-05-01

## 2024-05-01 RX ORDER — HYDROCHLOROTHIAZIDE 12.5 MG/1
12.5 TABLET ORAL DAILY
Qty: 90 TABLET | Refills: 3 | Status: SHIPPED | OUTPATIENT
Start: 2024-05-01 | End: 2024-05-23 | Stop reason: DRUGHIGH

## 2024-05-01 NOTE — PROGRESS NOTES
"  See other note     Subjective   Mackenzie is a 79 year old, presenting for the following health issues:  Recheck Medication        5/1/2024     1:08 PM   Additional Questions   Roomed by Lay SANCHES CMA     Via the Health Maintenance questionnaire, the patient has reported the following services have been completed -Eye Exam-Mammogram, this information has been sent to the abstraction team.  History of Present Illness       Diabetes:   She presents for follow up of diabetes.  She is checking home blood glucose one time daily.   She checks blood glucose before meals.  Blood glucose is never over 200 and never under 70. She is aware of hypoglycemia symptoms including shakiness.    She has no concerns regarding her diabetes at this time.  She is having burning in feet.  The patient has had a diabetic eye exam in the last 12 months. Eye exam performed on 03/28/24. Location of last eye exam [facility] - Dr Coughlin.        She eats 2-3 servings of fruits and vegetables daily.She consumes 0 sweetened beverage(s) daily.She exercises with enough effort to increase her heart rate 10 to 19 minutes per day.  She exercises with enough effort to increase her heart rate 4 days per week.   She is taking medications regularly.         Diabetes Follow-up        Hyperlipidemia Follow-Up    Hypertension Follow-up    Running higher at home 168/80, 151/83      BP Readings from Last 2 Encounters:   10/09/23 138/82   10/02/23 136/65     Hemoglobin A1C (%)   Date Value   04/30/2024 7.7 (H)   10/09/2023 7.7 (H)   02/04/2021 7.3 (H)   10/12/2020 8.3 (H)     LDL Cholesterol Calculated (mg/dL)   Date Value   04/30/2024 102 (H)   09/07/2022 84   10/12/2020 66   08/22/2019 74           Doing welll with the diabetes         Objective    BP (!) 148/96 (BP Location: Right arm, Patient Position: Sitting, Cuff Size: Adult Regular)   Pulse 72   Temp 98.7  F (37.1  C) (Tympanic)   Ht 1.626 m (5' 4\")   Wt 81.2 kg (179 lb)   SpO2 98%   BMI 30.73 kg/m  "   There is no height or weight on file to calculate BMI.  Physical Exam     As above   No results found for any visits on 05/01/24.        Signed Electronically by: Dorota Art MD

## 2024-05-01 NOTE — PROGRESS NOTES
Preoperative Evaluation  [facility]  [address] [address]  Phone: [phone]  Primary Provider: Dorota Perdomo  Pre-op Performing Provider: DOROTA PERDOMO  May 1, 2024       Mackenzie is a 79 year old, presenting for the following:  Recheck Medication        5/1/2024     1:08 PM   Additional Questions   Roomed by Lay SANCHES CMA     Surgical Information  Surgery/Procedure: Cataract    Surgery Location: [facility]  Surgeon: Dr. Harshad Coughlin  Surgery Date: 6/4/24 and 6/10/24  Time of Surgery: TBD  Where patient plans to recover: At home with family  Fax number for surgical facility: [phone]    Assessment & Plan     The proposed surgical procedure is considered LOW risk.    Type 2 diabetes, HbA1c goal < 7% (H)    - glimepiride (AMARYL) 1 MG tablet; Take 1 tablet (1 mg) by mouth every morning (before breakfast) Follow up visit 4/24    Benign essential hypertension    - hydroCHLOROthiazide 12.5 MG tablet; Take 1 tablet (12.5 mg) by mouth daily Follow up visit 4/24    Type 2 diabetes mellitus with hyperlipidemia (H)    - metFORMIN (GLUCOPHAGE XR) 500 MG 24 hr tablet; Take 2 tablets (1,000 mg) by mouth 2 times daily (with meals) Follow up visit in april    Hyperlipidemia LDL goal <100    - pravastatin (PRAVACHOL) 40 MG tablet; Take 1 tablet (40 mg) by mouth daily    Preop general physical exam      Encounter for screening mammogram for malignant neoplasm of breast    - *MA Screening Digital Bilateral; Future    Screening for cholesterol level      Cortical age-related cataract of both eyes              - No identified additional risk factors other than previously addressed    Antiplatelet or Anticoagulation Medication Instructions   - Patient is on no antiplatelet or anticoagulation medications.    Additional Medication Instructions  Patient is to take all scheduled medications on the day of surgery EXCEPT for modifications listed below:   - Diuretics: HOLD on  the day of surgery.   - metformin: HOLD day of surgery.   - sulfonylurea (e.g. glyburide, glipizide): HOLD day of surgery    Recommendation  APPROVAL GIVEN to proceed with proposed procedure, without further diagnostic evaluation.          Subjective         HPI related to upcoming procedure: vision has been worsening and it is time to get the cataracts done          5/1/2024     1:31 PM   Preop Questions   1. Have you ever had a heart attack or stroke? No   2. Have you ever had surgery on your heart or blood vessels, such as a stent placement, a coronary artery bypass, or surgery on an artery in your head, neck, heart, or legs? No   3. Do you have chest pain with activity? No   4. Do you have a history of  heart failure? No   5. Do you currently have a cold, bronchitis or symptoms of other infection? No   6. Do you have a cough, shortness of breath, or wheezing? No   7. Do you or anyone in your family have previous history of blood clots? No   8. Do you or does anyone in your family have a serious bleeding problem such as prolonged bleeding following surgeries or cuts? No   9. Have you ever had problems with anemia or been told to take iron pills? YES - not    10. Have you had any abnormal blood loss such as black, tarry or bloody stools, or abnormal vaginal bleeding? No   11. Have you ever had a blood transfusion? No   12. Are you willing to have a blood transfusion if it is medically needed before, during, or after your surgery? Yes   13. Have you or any of your relatives ever had problems with anesthesia? No   14. Do you have sleep apnea, excessive snoring or daytime drowsiness? No   15. Do you have any artifical heart valves or other implanted medical devices like a pacemaker, defibrillator, or continuous glucose monitor? No   16. Do you have artificial joints? No   17. Are you allergic to latex? No     Health Care Directive  Patient has a Health Care Directive on file      Preoperative Review of     reviewed - no record of controlled substances prescribed.      Status of Chronic Conditions:  See problem list for active medical problems.  Problems all longstanding and stable, except as noted/documented.  See ROS for pertinent symptoms related to these conditions.    Patient Active Problem List    Diagnosis Date Noted    Ventral hernia 06/23/2018     Priority: Medium    Benign essential hypertension 11/28/2016     Priority: Medium    Type 2 diabetes mellitus with hyperlipidemia (H) 10/25/2015     Priority: Medium    Hyperlipidemia LDL goal <100 01/23/2012     Priority: Medium    Type 2 diabetes, HbA1c goal < 7% (H) 01/23/2012     Priority: Medium    Diverticulosis of large intestine 10/10/2007     Priority: Medium     Problem list name updated by automated process. Provider to review      FAMILY HISTORY OF OSTEOPORSIS 04/20/2005     Priority: Medium     Mother taking meds  Problem list name updated by automated process. 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     Priority: Medium      Past Medical History:   Diagnosis Date    Benign essential hypertension     Diabetes (H) 2013    HTN (hypertension)     Need for prophylactic hormone replacement therapy (postmenopausal)     PONV (postoperative nausea and vomiting)      Past Surgical History:   Procedure Laterality Date    ARTHRODESIS FOOT Right 10/23/2014    Procedure: ARTHRODESIS FOOT;  Surgeon: Mauri Gomez DPM;  Location: [facility]    ARTHRODESIS FOOT Right 10/03/2019    Procedure: RIGHT 2ND METATARSAL PHALANGEAL JOINT REPAIR OF CAPSULE/STABILIZATION, 3RD TOE PROXIMAL INTERPHALANGEAL JOINT FUSION; 4TH TOE CORRECTION;  Surgeon: Mauri Gomez DPM;  Location: [facility]    BIOPSY BREAST      COLONOSCOPY  10/10/2007     Repeat in 5 years    COLONOSCOPY N/A 2018    Procedure: COLONOSCOPY;  Surgeon: Magno Huber MD;  Location: [facility]    LAPAROSCOPIC HERNIORRHAPHY VENTRAL N/A 2018    Procedure: LAPAROSCOPIC HERNIORRHAPHY VENTRAL;  LAPAROSCOPIC HERNIORRHAPHY VENTRAL ;  Surgeon: Jose Ibrahim MD;  Location: [facility]    LUMPECTOMY BREAST      SURGICAL HISTORY OF -       Hysterectomy    SURGICAL HISTORY OF -   2002    Right hammer toe/bunionectomy     Current Outpatient Medications   Medication Sig Dispense Refill    aspirin 81 MG tablet Take  by mouth daily.      CALCIUM 500 + D OR 1 tablet twice daily      FLAX SEED OIL OR twice daily      glimepiride (AMARYL) 1 MG tablet Take 1 tablet (1 mg) by mouth every morning (before breakfast) Follow up visit  90 tablet 1    hydrochlorothiazide (HYDRODIURIL) 12.5 MG tablet Take 1 tablet (12.5 mg) by mouth daily Follow up visit  90 tablet 1    IBANdronate (BONIVA) 150 MG tablet Take 1 tablet (150 mg) by mouth every 30 days 3 tablet 4    losartan (COZAAR) 100 MG tablet Take 1 tablet (100 mg) by mouth daily 90 tablet 3    metFORMIN (GLUCOPHAGE XR) 500 MG 24 hr tablet Take 2 tablets (1,000 mg) by mouth 2 times daily (with meals) Follow up visit in  tablet 0    pravastatin (PRAVACHOL) 40 MG tablet Take 1 tablet (40 mg) by mouth daily 90 tablet 0    ACCU-CHEK JULIO CÉSAR PLUS test strip TWICE DAILY 200 strip 1    FREESTYLE LANCETS MISC 1 each by Lancet route 2 times daily 100 each prn       Allergies   Allergen Reactions    Nkda [No Known Drug Allergy]         Social History     Tobacco Use    Smoking status: Former     Current packs/day: 0.00     Types: Cigarettes     Quit date: 1971     Years since quittin.3    Smokeless tobacco: Never   Substance Use Topics    Alcohol use: Yes     Comment: social       History   Drug Use No         Review of Systems    Review of Systems  Constitutional, HEENT, cardiovascular, pulmonary, gi and gu systems are negative,  "except as otherwise noted.    Objective    BP (!) 148/96 (BP Location: Right arm, Patient Position: Sitting, Cuff Size: Adult Regular)   Pulse 72   Temp 98.7  F (37.1  C) (Tympanic)   Ht 1.626 m (5' 4\")   Wt 81.2 kg (179 lb)   SpO2 98%   BMI 30.73 kg/m     Estimated body mass index is 30.73 kg/m  as calculated from the following:    Height as of this encounter: 1.626 m (5' 4\").    Weight as of this encounter: 81.2 kg (179 lb).  Physical Exam  GENERAL: alert and no distress  RESP: lungs clear to auscultation - no rales, rhonchi or wheezes  CV: regular rate and rhythm, normal S1 S2, no S3 or S4, no murmur, click or rub, no peripheral edema     Recent Labs   Lab Test 04/30/24  1026 10/09/23  1001 10/02/23  0412 09/07/22  0933   HGB  --   --  12.8  --    PLT  --   --  281  --    NA  --   --  134* 136   POTASSIUM  --   --  3.7 4.4   CR  --   --  0.52 0.69   A1C 7.7* 7.7*  --  7.5*        Diagnostics     No EKG required for low risk surgery (cataract, skin procedure, breast biopsy, etc).    Revised Cardiac Risk Index (RCRI)  The patient has the following serious cardiovascular risks for perioperative complications:   - No serious cardiac risks = 0 points     RCRI Interpretation: 0 points: Class I (very low risk - 0.4% complication rate)         Signed Electronically by: Dorota Art MD  Copy of this evaluation report is provided to requesting physician.         " VISUAL CHANGES

## 2024-05-01 NOTE — PATIENT INSTRUCTIONS
Preparing for Your Surgery  Getting started  A nurse will call you to review your health history and instructions. They will give you an arrival time based on your scheduled surgery time. Please be ready to share:  Your doctor's clinic name and phone number  Your medical, surgical, and anesthesia history  A list of allergies and sensitivities  A list of medicines, including herbal treatments and over-the-counter drugs  Whether the patient has a legal guardian (ask how to send us the papers in advance)  Please tell us if you're pregnant--or if there's any chance you might be pregnant. Some surgeries may injure a fetus (unborn baby), so they require a pregnancy test. Surgeries that are safe for a fetus don't always need a test, and you can choose whether to have one.   If you have a child who's having surgery, please ask for a copy of Preparing for Your Child's Surgery.    Preparing for surgery  Within 10 to 30 days of surgery: Have a pre-op exam (sometimes called an H&P, or History and Physical). This can be done at a clinic or pre-operative center.  If you're having a , you may not need this exam. Talk to your care team.  At your pre-op exam, talk to your care team about all medicines you take. If you need to stop any medicines before surgery, ask when to start taking them again.  We do this for your safety. Many medicines can make you bleed too much during surgery. Some change how well surgery (anesthesia) drugs work.  Call your insurance company to let them know you're having surgery. (If you don't have insurance, call [phone].)  Call your clinic if there's any change in your health. This includes signs of a cold or flu (sore throat, runny nose, cough, rash, fever). It also includes a scrape or scratch near the surgery site.  If you have questions on the day of surgery, call your hospital or surgery center.  Eating and drinking guidelines  For your safety: Unless your surgeon tells you otherwise,  follow the guidelines below.  Eat and drink as usual until 8 hours before you arrive for surgery. After that, no food or milk.  Drink clear liquids until 2 hours before you arrive. These are liquids you can see through, like water, Gatorade, and Propel Water. They also include plain black coffee and tea (no cream or milk), candy, and breath mints. You can spit out gum when you arrive.  If you drink alcohol: Stop drinking it the night before surgery.  If your care team tells you to take medicine on the morning of surgery, it's okay to take it with a sip of water.  Preventing infection  Shower or bathe the night before and morning of your surgery. Follow the instructions your clinic gave you. (If no instructions, use regular soap.)  Don't shave or clip hair near your surgery site. We'll remove the hair if needed.  Don't smoke or vape the morning of surgery. You may chew nicotine gum up to 2 hours before surgery. A nicotine patch is okay.  Note: Some surgeries require you to completely quit smoking and nicotine. Check with your surgeon.  Your care team will make every effort to keep you safe from infection. We will:  Clean our hands often with soap and water (or an alcohol-based hand rub).  Clean the skin at your surgery site with a special soap that kills germs.  Give you a special gown to keep you warm. (Cold raises the risk of infection.)  Wear special hair covers, masks, gowns and gloves during surgery.  Give antibiotic medicine, if prescribed. Not all surgeries need antibiotics.  What to bring on the day of surgery  Photo ID and insurance card  Copy of your health care directive, if you have one  Glasses and hearing aids (bring cases)  You can't wear contacts during surgery  Inhaler and eye drops, if you use them (tell us about these when you arrive)  CPAP machine or breathing device, if you use them  A few personal items, if spending the night  If you have . . .  A pacemaker, ICD (cardiac defibrillator) or other  implant: Bring the ID card.  An implanted stimulator: Bring the remote control.  A legal guardian: Bring a copy of the certified (court-stamped) guardianship papers.  Please remove any jewelry, including body piercings. Leave jewelry and other valuables at home.  If you're going home the day of surgery  You must have a responsible adult drive you home. They should stay with you overnight as well.  If you don't have someone to stay with you, and you aren't safe to go home alone, we may keep you overnight. Insurance often won't pay for this.  After surgery  If it's hard to control your pain or you need more pain medicine, please call your surgeon's office.  Questions?   If you have any questions for your care team, list them here: _________________________________________________________________________________________________________________________________________________________________________ ____________________________________ ____________________________________ ____________________________________  For informational purposes only. Not to replace the advice of your health care provider. Copyright   2003, 2019 [employer]. All rights reserved. Clinically reviewed by Kyra Arceo MD. SMARTworks 544004 - REV 12/22.    How to Take Your Medication Before Surgery  - HOLD (do not take) your HYDROCHLOROTHIAZIDE on the morning of surgery.   - HOLD (do not take) your METFORMIN on the morning of surgery.  - HOLD (do not take) your amaryl on the morning of surgery.

## 2024-05-23 ENCOUNTER — OFFICE VISIT (OUTPATIENT)
Dept: FAMILY MEDICINE | Facility: CLINIC | Age: 79
End: 2024-05-23
Payer: MEDICARE

## 2024-05-23 VITALS
BODY MASS INDEX: 29.82 KG/M2 | DIASTOLIC BLOOD PRESSURE: 76 MMHG | WEIGHT: 179 LBS | HEART RATE: 78 BPM | TEMPERATURE: 98.7 F | OXYGEN SATURATION: 98 % | SYSTOLIC BLOOD PRESSURE: 160 MMHG | HEIGHT: 65 IN

## 2024-05-23 DIAGNOSIS — I10 BENIGN ESSENTIAL HYPERTENSION: ICD-10-CM

## 2024-05-23 DIAGNOSIS — Z00.00 ENCOUNTER FOR MEDICARE ANNUAL WELLNESS EXAM: Primary | ICD-10-CM

## 2024-05-23 PROCEDURE — 99213 OFFICE O/P EST LOW 20 MIN: CPT | Mod: 25 | Performed by: FAMILY MEDICINE

## 2024-05-23 PROCEDURE — G0439 PPPS, SUBSEQ VISIT: HCPCS | Performed by: FAMILY MEDICINE

## 2024-05-23 RX ORDER — HYDROCHLOROTHIAZIDE 25 MG/1
25 TABLET ORAL DAILY
Qty: 90 TABLET | Refills: 3 | Status: SHIPPED | OUTPATIENT
Start: 2024-05-23

## 2024-05-23 SDOH — HEALTH STABILITY: PHYSICAL HEALTH: ON AVERAGE, HOW MANY DAYS PER WEEK DO YOU ENGAGE IN MODERATE TO STRENUOUS EXERCISE (LIKE A BRISK WALK)?: 3 DAYS

## 2024-05-23 ASSESSMENT — SOCIAL DETERMINANTS OF HEALTH (SDOH): HOW OFTEN DO YOU GET TOGETHER WITH FRIENDS OR RELATIVES?: MORE THAN THREE TIMES A WEEK

## 2024-05-23 NOTE — PATIENT INSTRUCTIONS
"Preventive Care Advice   This is general advice we often give to help people stay healthy. Your care team may have specific advice just for you. Please talk to your care team about your own preventive care needs.  Lifestyle  Exercise at least 150 minutes each week (30 minutes a day, 5 days a week).  Do muscle strengthening activities 2 days a week. These help control your weight and prevent disease.  No smoking.  Wear sunscreen to prevent skin cancer.  Have your home tested for radon every 2 to 5 years. Radon is a colorless, odorless gas that can harm your lungs. To learn more, go to www.health.[facility].mn. and search for \"Radon in Homes.\"  Keep guns unloaded and locked up in a safe place like a safe or gun vault, or, use a gun lock and hide the keys. Always lock away bullets separately. To learn more, visit happyview.[place].gov and search for \"safe gun storage.\"  Nutrition  Eat 5 or more servings of fruits and vegetables each day.  Try wheat bread, brown rice and whole grain pasta (instead of white bread, rice, and pasta).  Get enough calcium and vitamin D. Check the label on foods and aim for 100% of the RDA (recommended daily allowance).  Regular exams  Have a dental exam and cleaning every 6 months.  See your health care team every year to talk about:  Any changes in your health.  Any medicines your care team has prescribed.  Preventive care, family planning, and ways to prevent chronic diseases.  Shots (vaccines)   HPV shots (up to age 26), if you've never had them before.  Hepatitis B shots (up to age 59), if you've never had them before.  COVID-19 shot: Get this shot when it's due.  Flu shot: Get a flu shot every year.  Tetanus shot: Get a tetanus shot every 10 years.  Pneumococcal, hepatitis A, and RSV shots: Ask your care team if you need these based on your risk.  Shingles shot (for age 50 and up).  General health tests  Diabetes screening:  Starting at age 35, Get screened for diabetes at least every 3 years.  If " you are younger than age 35, ask your care team if you should be screened for diabetes.  Cholesterol test: At age 39, start having a cholesterol test every 5 years, or more often if advised.  Bone density scan (DEXA): At age 50, ask your care team if you should have this scan for osteoporosis (brittle bones).  Hepatitis C: Get tested at least once in your life.  Abdominal aortic aneurysm screening: Talk to your doctor about having this screening if you:  Have ever smoked; and  Are biologically male; and  Are between the ages of 65 and 75.  STIs (sexually transmitted infections)  Before age 24: Ask your care team if you should be screened for STIs.  After age 24: Get screened for STIs if you're at risk. You are at risk for STIs (including HIV) if:  You are sexually active with more than one person.  You don't use condoms every time.  You or a partner was diagnosed with a sexually transmitted infection.  If you are at risk for HIV, ask about PrEP medicine to prevent HIV.  Get tested for HIV at least once in your life, whether you are at risk for HIV or not.  Cancer screening tests  Cervical cancer screening: If you have a cervix, begin getting regular cervical cancer screening tests at age 21. Most people who have regular screenings with normal results can stop after age 65. Talk about this with your provider.  Breast cancer scan (mammogram): If you've ever had breasts, begin having regular mammograms starting at age 40. This is a scan to check for breast cancer.  Colon cancer screening: It is important to start screening for colon cancer at age 45.  Have a colonoscopy test every 10 years (or more often if you're at risk) Or, ask your provider about stool tests like a FIT test every year or Cologuard test every 3 years.  To learn more about your testing options, visit: www.BrieFix/916813.pdf.  For help making a decision, visit: christie/ey20580.  Prostate cancer screening test: If you have a prostate and are age 55  to 69, ask your provider if you would benefit from a yearly prostate cancer screening test.  Lung cancer screening: If you are a current or former smoker age 50 to 80, ask your care team if ongoing lung cancer screenings are right for you.  For informational purposes only. Not to replace the advice of your health care provider. Copyright   2023 [employer]. All rights reserved. Clinically reviewed by the [facility]. [employer] 884926 - REV 04/24.

## 2024-05-23 NOTE — COMMUNITY RESOURCES LIST (ENGLISH)
May 23, 2024           YOUR PERSONALIZED LIST OF SERVICES & PROGRAMS               Bill Payment Assistance      [employer] [facility] - Energy Assistance  [address] [place] [address] (Distance: 22.4 miles)  Phone: [phone]  Language: English, Belgian, Hmong, Angolan  Fee: Free  Accessibility: Translation services      [facility] [place] - Utility payment assistance  [address] [place] [address] (Distance: 3.5 miles)  Phone: [phone]) [phone]  Language: English  Fee: Free  Accessibility: Translation services      - Dislocated Worker/Adult [employer] Employment Program  Phone: (394) 581-1566  Email: chelsey@[email or web address]  Website: https://[employer]/services/employment-services/dislocated-worker-program/  Language: English, Angolan  Hours: Mon 8:00 AM - 4:30 PM Tue 8:00 AM - 4:30 PM Wed 8:00 AM - 4:30 PM Thu 8:00 AM - 4:30 PM Fri 8:00 AM - 4:30 PM  Fee: Free  Accessibility: Ada accessible               IMPORTANT NUMBERS & WEBSITES        Emergency Services  911  .   United Way  211 http://211unitedway.org  .   Poison Control  (882) 894-9889 http://[place]poison.org http://[place]poison.org  .     Suicide and Crisis Lifeline  988 http://988lifeline.org  .   Childhelp National Child Abuse Hotline  721.482.6263 http://Childhelphotline.org   .   National Sexual Assault Hotline  (671) 229-2536 (HOPE) http://Rainn.org   .     National Runaway Safeline  (275) 106-4481 (RUNAWAY) http://1800runaway.org  .   Pregnancy & Postpartum Support  Call/text 863-753-3458  [place]: http://ppsupportmn.org  [place]: http://Textingly.com/wi  .   Substance Abuse National Helpline ([facility])  626-596-HELP (5516) http://Findtreatment.gov   .                DISCLAIMER: These resources have been generated via the [employer] Platform. [employer] does not endorse any service providers mentioned in this resource list. [employer] does not guarantee that the services mentioned in this resource list will  be available to you or will improve your health or wellness.    [facility]

## 2024-05-23 NOTE — PROGRESS NOTES
"Preventive Care Visit  [facility] ELIDA Art MD, Family Medicine  May 23, 2024      Assessment & Plan     Encounter for Medicare annual wellness exam      Benign essential hypertension  Increased slightly will send blood pressure via mychart   - hydrochlorothiazide (HYDRODIURIL) 25 MG tablet; Take 1 tablet (25 mg) by mouth daily            BMI  Estimated body mass index is 30.25 kg/m  as calculated from the following:    Height as of this encounter: 1.638 m (5' 4.5\").    Weight as of this encounter: 81.2 kg (179 lb).       Counseling  Appropriate preventive services were discussed with this patient, including applicable screening as appropriate for fall prevention, nutrition, physical activity, Tobacco-use cessation, weight loss and cognition.  Checklist reviewing preventive services available has been given to the patient.  Reviewed patient's diet, addressing concerns and/or questions.   She is at risk for lack of exercise and has been provided with information to increase physical activity for the benefit of her well-being.       6 months for diabetic check     Subjective   Mackenzie is a 79 year old, presenting for the following:  Wellness Visit        5/23/2024     9:26 AM   Additional Questions   Roomed by Shakira GARCIA CMA         Via the Health Maintenance questionnaire, the patient has reported the following services have been completed -Mammogram: [facility] 2024-01-10, this information has not been sent to the abstraction team.  Health Care Directive  Patient has a Health Care Directive on file  Advance care planning document is on file and is current.    HPI        She is doing well checks blood pressure at home 140/70       5/23/2024   General Health   How would you rate your overall physical health? Good   Feel stress (tense, anxious, or unable to sleep) Not at all         5/23/2024   Nutrition   Diet: Regular (no restrictions)    Diabetic         5/23/2024   Exercise   Days " per week of moderate/strenous exercise 3 days         5/23/2024   Social Factors   Frequency of gathering with friends or relatives More than three times a week   Worry food won't last until get money to buy more No   Food not last or not have enough money for food? No   Do you have housing?  Yes   Are you worried about losing your housing? No   Lack of transportation? No   Unable to get utilities (heat,electricity)? Yes   Want help with housing or utility concern? No   (!) FINANCIAL RESOURCE STRAIN CONCERN      5/23/2024   Fall Risk   Fallen 2 or more times in the past year? No   Trouble with walking or balance? No          5/23/2024   Activities of Daily Living- Home Safety   Needs help with the following daily activites None of the above   Safety concerns in the home None of the above         5/23/2024   Dental   Dentist two times every year? Yes         5/23/2024   Hearing Screening   Hearing concerns? None of the above         5/23/2024   Driving Risk Screening   Patient/family members have concerns about driving No         5/23/2024   General Alertness/Fatigue Screening   Have you been more tired than usual lately? No         5/23/2024   Urinary Incontinence Screening   Bothered by leaking urine in past 6 months No         5/23/2024   TB Screening   Were you born outside of the [place]? No         Today's PHQ-2 Score:       5/23/2024     9:19 AM   PHQ-2 ( 1999 [employer])   Q1: Little interest or pleasure in doing things 0   Q2: Feeling down, depressed or hopeless 0   PHQ-2 Score 0   Q1: Little interest or pleasure in doing things Not at all   Q2: Feeling down, depressed or hopeless Not at all   PHQ-2 Score 0           5/23/2024   Substance Use   Alcohol more than 3/day or more than 7/wk No   Do you have a current opioid prescription? No   How severe/bad is pain from 1 to 10? 2/10   Do you use any other substances recreationally? No     Social History     Tobacco Use    Smoking status: Former     Current packs/day:  0.00     Types: Cigarettes     Quit date: 1971     Years since quittin.4    Smokeless tobacco: Never   Substance Use Topics    Alcohol use: Yes     Comment: social    Drug use: No           2021   LAST FHS-7 RESULTS   1st degree relative breast or ovarian cancer No   Any relative bilateral breast cancer No   Any male have breast cancer No   Any ONE woman have BOTH breast AND ovarian cancer No   Any woman with breast cancer before 50yrs No   2 or more relatives with breast AND/OR ovarian cancer No   2 or more relatives with breast AND/OR bowel cancer Yes        Mammogram Screening - After age 74- determine frequency with patient based on health status, life expectancy and patient goals    ASCVD Risk   The 10-year ASCVD risk score (Jeremy ROSS, et al., 2019) is: 71.8%    Values used to calculate the score:      Age: 79 years      Sex: Female      Is Non- : No      Diabetic: Yes      Tobacco smoker: No      Systolic Blood Pressure: 186 mmHg      Is BP treated: Yes      HDL Cholesterol: 78 mg/dL      Total Cholesterol: 199 mg/dL        Reviewed and updated as needed this visit by Provider                    Past Medical History:   Diagnosis Date    Benign essential hypertension     Diabetes (H)     HTN (hypertension)     Need for prophylactic hormone replacement therapy (postmenopausal)     PONV (postoperative nausea and vomiting)      Current providers sharing in care for this patient include:  Patient Care Team:  Dorota Art MD as PCP - General (Family Practice)  Dorota Art MD as Assigned PCP  Francisca Yoder RD as Diabetes Educator (Dietitian, Registered)    The following health maintenance items are reviewed in Epic and correct as of today:  Health Maintenance   Topic Date Due    MAMMO SCREENING  2023    BMP  10/02/2024    DIABETIC FOOT EXAM  10/09/2024    ANNUAL REVIEW OF HM ORDERS  10/09/2024    A1C  10/30/2024    EYE EXAM  2025     "LIPID  04/30/2025    MICROALBUMIN  04/30/2025    MEDICARE ANNUAL WELLNESS VISIT  05/23/2025    FALL RISK ASSESSMENT  05/23/2025    ADVANCE CARE PLANNING  10/26/2026    DEXA  10/19/2028    COLORECTAL CANCER SCREENING  12/06/2028    DTAP/TDAP/TD IMMUNIZATION (3 - Td or Tdap) 06/03/2031    HEPATITIS C SCREENING  Completed    PHQ-2 (once per calendar year)  Completed    INFLUENZA VACCINE  Completed    Pneumococcal Vaccine: 65+ Years  Completed    ZOSTER IMMUNIZATION  Completed    RSV VACCINE (Pregnancy & 60+)  Completed    IPV IMMUNIZATION  Aged Out    HPV IMMUNIZATION  Aged Out    MENINGITIS IMMUNIZATION  Aged Out    RSV MONOCLONAL ANTIBODY  Aged Out    COVID-19 Vaccine  Discontinued         Review of Systems  Constitutional, HEENT, cardiovascular, pulmonary, gi and gu systems are negative, except as otherwise noted.     Objective    Exam  BP (!) 160/76   Pulse 78   Temp 98.7  F (37.1  C) (Tympanic)   Ht 1.638 m (5' 4.5\")   Wt 81.2 kg (179 lb)   SpO2 98%   BMI 30.25 kg/m     Estimated body mass index is 30.25 kg/m  as calculated from the following:    Height as of this encounter: 1.638 m (5' 4.5\").    Weight as of this encounter: 81.2 kg (179 lb).    Physical Exam  GENERAL: alert and no distress        5/23/2024   Mini Cog   Clock Draw Score 2 Normal   3 Item Recall 3 objects recalled   Mini Cog Total Score 5              Signed Electronically by: Dorota Art MD    "

## 2024-06-06 ENCOUNTER — PATIENT OUTREACH (OUTPATIENT)
Dept: CARE COORDINATION | Facility: CLINIC | Age: 79
End: 2024-06-06
Payer: MEDICARE

## 2024-06-12 ENCOUNTER — HOSPITAL ENCOUNTER (OUTPATIENT)
Dept: MAMMOGRAPHY | Facility: CLINIC | Age: 79
Discharge: HOME | End: 2024-06-12
Attending: FAMILY MEDICINE | Admitting: FAMILY MEDICINE
Payer: MEDICARE

## 2024-06-12 DIAGNOSIS — Z12.31 ENCOUNTER FOR SCREENING MAMMOGRAM FOR MALIGNANT NEOPLASM OF BREAST: ICD-10-CM

## 2024-06-12 PROCEDURE — 77063 BREAST TOMOSYNTHESIS BI: CPT

## 2024-06-24 ENCOUNTER — TRANSFERRED RECORDS (OUTPATIENT)
Dept: HEALTH INFORMATION MANAGEMENT | Facility: CLINIC | Age: 79
End: 2024-06-24
Payer: MEDICARE

## 2024-07-08 ENCOUNTER — TRANSFERRED RECORDS (OUTPATIENT)
Dept: HEALTH INFORMATION MANAGEMENT | Facility: CLINIC | Age: 79
End: 2024-07-08
Payer: MEDICARE

## 2024-07-17 ENCOUNTER — TRANSFERRED RECORDS (OUTPATIENT)
Dept: HEALTH INFORMATION MANAGEMENT | Facility: CLINIC | Age: 79
End: 2024-07-17
Payer: MEDICARE

## 2024-08-07 ENCOUNTER — TRANSFERRED RECORDS (OUTPATIENT)
Dept: HEALTH INFORMATION MANAGEMENT | Facility: CLINIC | Age: 79
End: 2024-08-07
Payer: MEDICARE

## 2024-08-12 ENCOUNTER — OFFICE VISIT (OUTPATIENT)
Dept: FAMILY MEDICINE | Facility: CLINIC | Age: 79
End: 2024-08-12
Payer: MEDICARE

## 2024-08-12 VITALS
DIASTOLIC BLOOD PRESSURE: 68 MMHG | SYSTOLIC BLOOD PRESSURE: 124 MMHG | TEMPERATURE: 97 F | HEART RATE: 80 BPM | HEIGHT: 66 IN | RESPIRATION RATE: 16 BRPM | OXYGEN SATURATION: 97 % | WEIGHT: 175 LBS | BODY MASS INDEX: 28.12 KG/M2

## 2024-08-12 DIAGNOSIS — R11.2 NAUSEA AND VOMITING, UNSPECIFIED VOMITING TYPE: ICD-10-CM

## 2024-08-12 DIAGNOSIS — H81.13 BENIGN PAROXYSMAL POSITIONAL VERTIGO DUE TO BILATERAL VESTIBULAR DISORDER: Primary | ICD-10-CM

## 2024-08-12 DIAGNOSIS — T75.3XXA MOTION SICKNESS, INITIAL ENCOUNTER: ICD-10-CM

## 2024-08-12 PROCEDURE — 99214 OFFICE O/P EST MOD 30 MIN: CPT | Performed by: NURSE PRACTITIONER

## 2024-08-12 PROCEDURE — G2211 COMPLEX E/M VISIT ADD ON: HCPCS | Performed by: NURSE PRACTITIONER

## 2024-08-12 RX ORDER — SCOLOPAMINE TRANSDERMAL SYSTEM 1 MG/1
1 PATCH, EXTENDED RELEASE TRANSDERMAL
Qty: 3 PATCH | Refills: 0 | Status: SHIPPED | OUTPATIENT
Start: 2024-08-12 | End: 2024-10-02

## 2024-08-12 RX ORDER — MECLIZINE HYDROCHLORIDE 25 MG/1
25 TABLET ORAL 3 TIMES DAILY PRN
Qty: 90 TABLET | Refills: 1 | Status: SHIPPED | OUTPATIENT
Start: 2024-08-12 | End: 2024-10-02

## 2024-08-12 RX ORDER — ONDANSETRON 4 MG/1
4 TABLET, ORALLY DISINTEGRATING ORAL EVERY 8 HOURS PRN
Qty: 30 TABLET | Refills: 0 | Status: SHIPPED | OUTPATIENT
Start: 2024-08-12 | End: 2024-10-02

## 2024-08-12 ASSESSMENT — PAIN SCALES - GENERAL: PAINLEVEL: NO PAIN (0)

## 2024-08-12 NOTE — PROGRESS NOTES
Assessment & Plan     Benign paroxysmal positional vertigo due to bilateral vestibular disorder  Orthostatics completed and negative today for orthostatic hypotension.  Most likely BPPV since she does have a history of this.  Will treat with Antivert as needed and Zofran for the nausea and vomiting.  I have referred her to PT for vestibular exercises for treatment.  - meclizine (ANTIVERT) 25 MG tablet; Take 1 tablet (25 mg) by mouth 3 times daily as needed for dizziness  - ondansetron (ZOFRAN ODT) 4 MG ODT tab; Take 1 tablet (4 mg) by mouth every 8 hours as needed for nausea  - Physical Therapy  Referral; Future    Nausea and vomiting, unspecified vomiting type  See note above.  - ondansetron (ZOFRAN ODT) 4 MG ODT tab; Take 1 tablet (4 mg) by mouth every 8 hours as needed for nausea    Motion sickness, initial encounter  Patient will be going on a day cruise.  I have filled her with scopolamine to cover her cruise for 8 days +24 hours prior to the cruise.  I also advised her she may want to try sea band as well.    - scopolamine (TRANSDERM) 1 MG/3DAYS 72 hr patch; Place 1 patch onto the skin every 72 hours    See Patient Instructions    Subjective   Mackenzie is a 79 year old, presenting for the following health issues:  Dizziness        8/12/2024     2:44 PM   Additional Questions   Roomed by rmb   Accompanied by self         8/12/2024     2:44 PM   Patient Reported Additional Medications   Patient reports taking the following new medications none     History of Present Illness       Reason for visit:  Vertigo  Symptom onset:  3-7 days ago  Symptom intensity:  Severe  Symptom progression:  Staying the same  Had these symptoms before:  Yes  Has tried/received treatment for these symptoms:  Yes  Previous treatment was successful:  Yes  Prior treatment description:  Exercises  What makes it worse:  Moving head  What makes it better:  Not moving head    She eats 4 or more servings of fruits and vegetables  "daily.She consumes 0 sweetened beverage(s) daily.She exercises with enough effort to increase her heart rate 30 to 60 minutes per day.  She exercises with enough effort to increase her heart rate 3 or less days per week.   She is taking medications regularly.     Patient has history of BPPV that was diagnosed in 10/2023.  Patient states that Epley maneuvers in the past have been helpful but she is really unable to do them alone by herself at home.  She states that the first 24 hours was really bad with the nausea and vomiting but this has subsided a little.    Patient also is going on a cruise for 8 days and would like some preventative medication for motion sickness while on the cruise.      Review of Systems  CONSTITUTIONAL: NEGATIVE for fever, chills, change in weight  RESP: NEGATIVE for significant cough or SOB  CV: NEGATIVE for chest pain, palpitations or peripheral edema  NEURO: POSITIVE for dizziness/lightheadedness, history of BPPV, dizziness is intermittent.  PSYCHIATRIC: NEGATIVE for changes in mood or affect  ROS otherwise negative      Objective    /68   Pulse 80   Temp 97  F (36.1  C) (Tympanic)   Resp 16   Ht 1.664 m (5' 5.5\")   Wt 79.4 kg (175 lb)   SpO2 97%   BMI 28.68 kg/m    Body mass index is 28.68 kg/m .  Physical Exam   GENERAL: alert and no distress  RESP: lungs clear to auscultation - no rales, rhonchi or wheezes  CV: regular rate and rhythm, normal S1 S2, no S3 or S4, no murmur, click or rub, no peripheral edema  PSYCH: mentation appears normal, affect normal/bright    Signed Electronically by: Sasha Barriga NP      "

## 2024-08-12 NOTE — PATIENT INSTRUCTIONS
Take Meclizine as needed for dizziness.  I filled Zofran for nausea and vomiting as needed.  I referred you to Physical Therapy for vestibular exercises for the BPPV.  I filled you with 3 scopolamine patches for your cruise.  Apply 24 hours prior to your departure and keep replacing every 3 days until completed.  When applying these, make sure you throw away the packaging/old patch and wash your hands immediately after replacement or removal.

## 2024-08-21 ENCOUNTER — THERAPY VISIT (OUTPATIENT)
Dept: PHYSICAL THERAPY | Facility: CLINIC | Age: 79
End: 2024-08-21
Attending: NURSE PRACTITIONER
Payer: MEDICARE

## 2024-08-21 DIAGNOSIS — H81.13 BENIGN PAROXYSMAL POSITIONAL VERTIGO DUE TO BILATERAL VESTIBULAR DISORDER: Primary | ICD-10-CM

## 2024-08-21 PROCEDURE — 97161 PT EVAL LOW COMPLEX 20 MIN: CPT | Mod: GP | Performed by: PHYSICAL THERAPIST

## 2024-08-21 PROCEDURE — 95992 CANALITH REPOSITIONING PROC: CPT | Mod: GP | Performed by: PHYSICAL THERAPIST

## 2024-08-21 PROCEDURE — 97530 THERAPEUTIC ACTIVITIES: CPT | Mod: GP,59 | Performed by: PHYSICAL THERAPIST

## 2024-08-21 NOTE — PROGRESS NOTES
PHYSICAL THERAPY EVALUATION  Type of Visit: Evaluation       Fall Risk Screen:  Fall screen completed by: PT  Have you fallen 2 or more times in the past year?: No  Have you fallen and had an injury in the past year?: No  Is patient a fall risk?: No    Subjective        Presenting condition or subjective complaint: Dizzy  Date of onset: 07/21/24    Relevant medical history:   type II DM, HTN,   Dates & types of surgery:      Prior diagnostic imaging/testing results: MRI; CT scan; X-ray; Bone scan     Prior therapy history for the same diagnosis, illness or injury: No      Prior Level of Function  Transfers:   Ambulation:   ADL:   IADL:     Living Environment  Social support: With a significant other or spouse   Type of home: House; 1 level; Basement   Stairs to enter the home: No       Ramp: No   Stairs inside the home: No       Help at home: Other  Equipment owned:       Employment: No    Hobbies/Interests:      Patient goals for therapy: Mo dizzynessimprove dizziness, go on cruise     Pain assessment:      Objective          VESTIBULAR EVALUATION  ADDITIONAL HISTORY:  Description of symptoms:  About a month ago, started having severe dizziness that was causing her to throw up.   Dizzy attacks: nausea at start, spinning    Start:  1 month ago    Last attack:  last week    Frequency of occurrences:  daily    Length of attack:  a couple minutes  Difficulty hearing:  no  Noise in ears?     no  Alleviates symptoms:  being still, meclazine   Worsens symptoms:  turning her head, laying down, looking down/up   Activities that bring on symptoms:         Pertinent visual history: no changes   Pertinent history of current vestibular problem: none  DHI:      Cervicogenic Screen    Neck ROM Normal   Vertebral Artery Test Normal   Alar Ligament Test Normal   Transverse Ligament Test Normal   Distraction    Neck Torsion Test (head still, body rotating)    Neck Torsion Test (head and body rotating)         Oculomotor Screen     Ocular ROM Normal   Smooth Pursuit Normal   Saccades Normal   VOR Normal   VOR Cancellation Normal   Head Impulse Test Normal   Convergence Testing Normal        Infrared Goggle Exam Vestibular Suppressant in Last 24 Hours? Yes  Exam Completed With: Room light   Spontaneous Nystagmus Negative   Gaze Evoked Nystagmus Negative   Head Shake Horizontal Nystagmus Negative   Positional Testing    Supine Head-Hanging Test     Left Right   Sonya-Hallpike Upbeating L torsional    Sidelying Test     HSCC Supine Roll Test     HSCC Forward Roll Test     [place] and Lean Test -  Sitting Erect    [place] and Lean Test - Seated, Head Bent 60 Degrees Forward    [place] and Lean Test - Seated, Head Bent Backwards       BPPV Canal(s): L Posterior  BPPV Type: Canalithasis    Dynamic Visual Acuity (DVA)    Static Acuity (LogMar)    Horizontal Head Movement at 1 Hz (LogMar)    Horizontal Head Movement at 2 Hz (LogMar)               Assessment & Plan   CLINICAL IMPRESSIONS  Medical Diagnosis: Benign paroxysmal positional vertigo due to bilateral vestibular disorder (H81.13)    Treatment Diagnosis: BPPV, left   Impression/Assessment: Patient is a 79 year old female with dizziness, signs and symptoms consistent with L posterior canalithiasis .  The following significant findings have been identified: Impaired balance, Decreased proprioception, Instability, and Dizziness. These impairments interfere with their ability to perform recreational activities, household mobility, and community mobility as compared to previous level of function.     Clinical Decision Making (Complexity):  Clinical Presentation: Stable/Uncomplicated  Clinical Presentation Rationale: based on medical and personal factors listed in PT evaluation  Clinical Decision Making (Complexity): Low complexity    PLAN OF CARE  Treatment Interventions:  Interventions: Gait Training, Neuromuscular Re-education, Therapeutic Activity, Therapeutic Exercise, Self-Care/Home Management, Canalith  Repositioning    Long Term Goals     PT Goal 1  Goal Identifier: 1  Goal Description: Patient will be able lay down in bed either direction with no symptoms of dizziness in order to improve bed mobility  Target Date: 09/04/24  PT Goal 2  Goal Identifier: 2  Goal Description: Patient will be able to ambulate with no LOB or sway in order to improve community ambulation.  Target Date: 09/18/24  PT Goal 3  Goal Identifier: 3  Goal Description: Patient will report no dizziness with ADL's in order to return to previous level of function with improved safety.  Target Date: 09/18/24      Frequency of Treatment: 2x/week  Duration of Treatment: 4 weeks    Recommended Referrals to Other Professionals:   Education Assessment:   Learner/Method: Patient  Education Comments: educated on role of P and POC    Risks and benefits of evaluation/treatment have been explained.   Patient/Family/caregiver agrees with Plan of Care.     Evaluation Time:     PT Eval, Low Complexity Minutes (84703): 12       Signing Clinician: Fartun Purcell PT        [facility]                                                                                   OUTPATIENT PHYSICAL THERAPY      PLAN OF TREATMENT FOR OUTPATIENT REHABILITATION   Patient's Last Name, First Name, Mackenzie Bennett YOB: 1945   Provider's Name   [facility]   Medical Record No.  [id number]     Onset Date: 07/21/24  Start of Care Date: 08/21/24     Medical Diagnosis:  Benign paroxysmal positional vertigo due to bilateral vestibular disorder (H81.13)      PT Treatment Diagnosis:  BPPV, left Plan of Treatment  Frequency/Duration: 2x/week/ 4 weeks    Certification date from 08/21/24 to 09/18/24         See note for plan of treatment details and functional goals     Fartun Purcell, KJ                         I CERTIFY THE NEED FOR THESE SERVICES FURNISHED UNDER        THIS PLAN OF TREATMENT AND WHILE UNDER MY  CARE     (Physician attestation of this document indicates review and certification of the therapy plan).              Referring Provider:  Sasha Barriga    Initial Assessment  See Epic Evaluation- Start of Care Date: 08/21/24

## 2024-08-23 ENCOUNTER — THERAPY VISIT (OUTPATIENT)
Dept: PHYSICAL THERAPY | Facility: CLINIC | Age: 79
End: 2024-08-23
Attending: NURSE PRACTITIONER
Payer: MEDICARE

## 2024-08-23 DIAGNOSIS — H81.13 BENIGN PAROXYSMAL POSITIONAL VERTIGO DUE TO BILATERAL VESTIBULAR DISORDER: Primary | ICD-10-CM

## 2024-08-23 PROCEDURE — 95992 CANALITH REPOSITIONING PROC: CPT | Mod: GP | Performed by: PHYSICAL THERAPIST

## 2024-10-02 ENCOUNTER — TELEPHONE (OUTPATIENT)
Dept: FAMILY MEDICINE | Facility: CLINIC | Age: 79
End: 2024-10-02

## 2024-10-02 ENCOUNTER — OFFICE VISIT (OUTPATIENT)
Dept: FAMILY MEDICINE | Facility: CLINIC | Age: 79
End: 2024-10-02
Payer: MEDICARE

## 2024-10-02 VITALS
SYSTOLIC BLOOD PRESSURE: 138 MMHG | RESPIRATION RATE: 16 BRPM | HEIGHT: 66 IN | WEIGHT: 176.5 LBS | DIASTOLIC BLOOD PRESSURE: 70 MMHG | HEART RATE: 72 BPM | OXYGEN SATURATION: 98 % | TEMPERATURE: 98.6 F | BODY MASS INDEX: 28.37 KG/M2

## 2024-10-02 DIAGNOSIS — E78.5 TYPE 2 DIABETES MELLITUS WITH HYPERLIPIDEMIA (H): ICD-10-CM

## 2024-10-02 DIAGNOSIS — Z98.890 PONV (POSTOPERATIVE NAUSEA AND VOMITING): ICD-10-CM

## 2024-10-02 DIAGNOSIS — M85.852 OSTEOPENIA OF BOTH HIPS: ICD-10-CM

## 2024-10-02 DIAGNOSIS — E87.1 HYPONATREMIA: Primary | ICD-10-CM

## 2024-10-02 DIAGNOSIS — E78.5 HYPERLIPIDEMIA LDL GOAL <100: ICD-10-CM

## 2024-10-02 DIAGNOSIS — R11.2 PONV (POSTOPERATIVE NAUSEA AND VOMITING): ICD-10-CM

## 2024-10-02 DIAGNOSIS — E11.69 TYPE 2 DIABETES MELLITUS WITH HYPERLIPIDEMIA (H): ICD-10-CM

## 2024-10-02 DIAGNOSIS — Z01.818 PREOP GENERAL PHYSICAL EXAM: Primary | ICD-10-CM

## 2024-10-02 DIAGNOSIS — I10 BENIGN ESSENTIAL HYPERTENSION: ICD-10-CM

## 2024-10-02 DIAGNOSIS — M85.851 OSTEOPENIA OF BOTH HIPS: ICD-10-CM

## 2024-10-02 DIAGNOSIS — M16.11 ARTHRITIS OF RIGHT HIP: ICD-10-CM

## 2024-10-02 LAB
ANION GAP SERPL CALCULATED.3IONS-SCNC: 13 MMOL/L (ref 7–15)
BASOPHILS # BLD AUTO: 0.1 10E3/UL (ref 0–0.2)
BASOPHILS NFR BLD AUTO: 1 %
BUN SERPL-MCNC: 9 MG/DL (ref 8–23)
CALCIUM SERPL-MCNC: 9.2 MG/DL (ref 8.8–10.4)
CHLORIDE SERPL-SCNC: 94 MMOL/L (ref 98–107)
CREAT SERPL-MCNC: 0.49 MG/DL (ref 0.51–0.95)
EGFRCR SERPLBLD CKD-EPI 2021: >90 ML/MIN/1.73M2
EOSINOPHIL # BLD AUTO: 0.2 10E3/UL (ref 0–0.7)
EOSINOPHIL NFR BLD AUTO: 4 %
ERYTHROCYTE [DISTWIDTH] IN BLOOD BY AUTOMATED COUNT: 11.9 % (ref 10–15)
EST. AVERAGE GLUCOSE BLD GHB EST-MCNC: 189 MG/DL
GLUCOSE SERPL-MCNC: 146 MG/DL (ref 70–99)
HBA1C MFR BLD: 8.2 % (ref 0–5.6)
HCO3 SERPL-SCNC: 23 MMOL/L (ref 22–29)
HCT VFR BLD AUTO: 37.4 % (ref 35–47)
HGB BLD-MCNC: 12.6 G/DL (ref 11.7–15.7)
IMM GRANULOCYTES # BLD: 0 10E3/UL
IMM GRANULOCYTES NFR BLD: 0 %
LYMPHOCYTES # BLD AUTO: 1.6 10E3/UL (ref 0.8–5.3)
LYMPHOCYTES NFR BLD AUTO: 34 %
MCH RBC QN AUTO: 28.4 PG (ref 26.5–33)
MCHC RBC AUTO-ENTMCNC: 33.7 G/DL (ref 31.5–36.5)
MCV RBC AUTO: 84 FL (ref 78–100)
MONOCYTES # BLD AUTO: 0.5 10E3/UL (ref 0–1.3)
MONOCYTES NFR BLD AUTO: 11 %
NEUTROPHILS # BLD AUTO: 2.4 10E3/UL (ref 1.6–8.3)
NEUTROPHILS NFR BLD AUTO: 50 %
PLATELET # BLD AUTO: 263 10E3/UL (ref 150–450)
POTASSIUM SERPL-SCNC: 5 MMOL/L (ref 3.4–5.3)
RBC # BLD AUTO: 4.43 10E6/UL (ref 3.8–5.2)
SODIUM SERPL-SCNC: 130 MMOL/L (ref 135–145)
WBC # BLD AUTO: 4.8 10E3/UL (ref 4–11)

## 2024-10-02 PROCEDURE — 99207 PR FOOT EXAM NO CHARGE: CPT | Performed by: PHYSICIAN ASSISTANT

## 2024-10-02 PROCEDURE — 83036 HEMOGLOBIN GLYCOSYLATED A1C: CPT | Performed by: PHYSICIAN ASSISTANT

## 2024-10-02 PROCEDURE — 80048 BASIC METABOLIC PNL TOTAL CA: CPT | Performed by: PHYSICIAN ASSISTANT

## 2024-10-02 PROCEDURE — 99214 OFFICE O/P EST MOD 30 MIN: CPT | Performed by: PHYSICIAN ASSISTANT

## 2024-10-02 PROCEDURE — 85025 COMPLETE CBC W/AUTO DIFF WBC: CPT | Performed by: PHYSICIAN ASSISTANT

## 2024-10-02 PROCEDURE — 36415 COLL VENOUS BLD VENIPUNCTURE: CPT | Performed by: PHYSICIAN ASSISTANT

## 2024-10-02 PROCEDURE — G2211 COMPLEX E/M VISIT ADD ON: HCPCS | Performed by: PHYSICIAN ASSISTANT

## 2024-10-02 PROCEDURE — 93000 ELECTROCARDIOGRAM COMPLETE: CPT | Performed by: PHYSICIAN ASSISTANT

## 2024-10-02 RX ORDER — MUPIROCIN 20 MG/G
OINTMENT TOPICAL
COMMUNITY
Start: 2024-09-30

## 2024-10-02 RX ORDER — CELECOXIB 100 MG/1
CAPSULE ORAL
COMMUNITY
Start: 2024-09-30

## 2024-10-02 RX ORDER — OXYCODONE HYDROCHLORIDE 5 MG/1
TABLET ORAL
COMMUNITY
Start: 2024-09-30

## 2024-10-02 RX ORDER — HUMAN INSULIN 100 [IU]/ML
INJECTION, SOLUTION SUBCUTANEOUS
COMMUNITY
Start: 2024-09-30

## 2024-10-02 NOTE — PATIENT INSTRUCTIONS
How to Take Your Medication Before Surgery  Preoperative Medication Instructions   Stop herbals/supplements  Day of surgery do not take: hydrochlorothiazide, losartan, metformin, glimepiride  Stop ibuprofen 1 day prior to surgery unless surgeon recommends stopping sooner  You can take your pravastatin day of surgery      Patient Education   Preparing for Your Surgery  For Adults  Getting started  In most cases, a nurse will call to review your health history and instructions. They will give you an arrival time based on your scheduled surgery time. Please be ready to share:  Your doctor's clinic name and phone number  Your medical, surgical, and anesthesia history  A list of allergies and sensitivities  A list of medicines, including herbal treatments and over-the-counter drugs  Whether the patient has a legal guardian (ask how to send us the papers in advance)  Note: You may not receive a call if you were seen at our PAC (Preoperative Assessment Center).  Please tell us if you're pregnant--or if there's any chance you might be pregnant. Some surgeries may injure a fetus (unborn baby), so they require a pregnancy test. Surgeries that are safe for a fetus don't always need a test, and you can choose whether to have one.   Preparing for surgery  Within 10 to 30 days of surgery: Have a pre-op exam (sometimes called an H&P, or History and Physical). This can be done at a clinic or pre-operative center.  If you're having a , you may not need this exam. Talk to your care team.  At your pre-op exam, talk to your care team about all medicines you take. (This includes CBD oil and any drugs, such as THC, marijuana, and other forms of cannabis.) If you need to stop any medicine before surgery, ask when to start taking it again.  This is for your safety. Many medicines and drugs can make you bleed too much during surgery. Some change how well surgery (anesthesia) drugs work.  Call your insurance company to let them know  you're having surgery. (If you don't have insurance, call [phone].)  Call your clinic if there's any change in your health. This includes a scrape or scratch near the surgery site, or any signs of a cold (sore throat, runny nose, cough, rash, fever).  Eating and drinking guidelines  For your safety: Unless your surgeon tells you otherwise, follow the guidelines below.  Eat and drink as normal until 8 hours before you arrive for surgery. After that, no food or milk. You can spit out gum when you arrive.  Drink clear liquids until 2 hours before you arrive. These are liquids you can see through, like water, Gatorade, and Propel Water. They also include plain black coffee and tea (no cream or milk).  No alcohol for 24 hours before you arrive. The night before surgery, stop any drinks that contain THC.  If your care team tells you to take medicine on the morning of surgery, it's okay to take it with a sip of water. No other medicines or drugs are allowed (including CBD oil)--follow your care team's instructions.  If you have questions the day of surgery, call your hospital or surgery center.   Preventing infection  Shower or bathe the night before and the morning of surgery. Follow the instructions your clinic gave you. (If no instructions, use regular soap.)  Don't shave or clip hair near your surgery site. We'll remove the hair if needed.  Don't smoke or vape the morning of surgery. No chewing tobacco for 6 hours before you arrive. A nicotine patch is okay. You may spit out nicotine gum when you arrive.  For some surgeries, the surgeon will tell you to fully quit smoking and nicotine.  We will make every effort to keep you safe from infection. We will:  Clean our hands often with soap and water (or an alcohol-based hand rub).  Clean the skin at your surgery site with a special soap that kills germs.  Give you a special gown to keep you warm. (Cold raises the risk of infection.)  Wear hair covers, masks, gowns,  and gloves during surgery.  Give antibiotic medicine, if prescribed. Not all surgeries need this medicine.  What to bring on the day of surgery  Photo ID and insurance card  Copy of your health care directive, if you have one  Glasses and hearing aids (bring cases)  You can't wear contacts during surgery  Inhaler and eye drops, if you use them (tell us about these when you arrive)  CPAP machine or breathing device, if you use them  A few personal items, if spending the night  If you have . . .  A pacemaker, ICD (cardiac defibrillator), or other implant: Bring the ID card.  An implanted stimulator: Bring the remote control.  A legal guardian: Bring a copy of the certified (court-stamped) guardianship papers.  Please remove any jewelry, including body piercings. Leave jewelry and other valuables at home.  If you're going home the day of surgery  You must have a responsible adult drive you home. They should stay with you overnight as well.  If you don't have someone to stay with you, and you aren't safe to go home alone, we may keep you overnight. Insurance often won't pay for this.  After surgery  If it's hard to control your pain or you need more pain medicine, please call your surgeon's office.  Questions?   If you have any questions for your care team, list them here:   ____________________________________________________________________________________________________________________________________________________________________________________________________________________________________________________________  For informational purposes only. Not to replace the advice of your health care provider. Copyright   2003, 2019 [facility]. All rights reserved. Clinically reviewed by Javi Yuan MD. [employer] [id number] - REV 08/24.

## 2024-10-02 NOTE — PROGRESS NOTES
Preoperative Evaluation  [facility]  [address] [facility] [address]  Phone: [phone]  Primary Provider: Dorota Art MD  Pre-op Performing Provider: Elizabeth Claire PA-C  Oct 2, 2024               10/2/2024   Surgical Information   What procedure is being done? Right total hip arthroplasty direct anterior   Facility or Hospital where procedure/surgery will be performed: [facility]   Who is doing the procedure / surgery? Dr. Rodriges   Date of surgery / procedure: 10/9/2024   Time of surgery / procedure: TBD   Where do you plan to recover after surgery? Other - Possible may have to spend the night for one night.      Fax number for surgical facility: [phone]    Assessment & Plan     The proposed surgical procedure is considered INTERMEDIATE risk.    Preop general physical exam  Discussed with ortho. Reschedule surgery after A1C <7.5.  - CBC with platelets and differential; Future  - EKG 12-lead complete w/read - Clinics      Arthritis of right hip  Surgery recommended.    PONV (postoperative nausea and vomiting)  Patient will discuss with anesthesiologist prior to surgery    Type 2 diabetes mellitus with hyperlipidemia (H)  A1C 8.2 - will increase glimepiride to goal A1C <7.5 prior to surgery.  - Hemoglobin A1c; Future  - FOOT EXAM    Benign essential hypertension  Well controlled.  - BASIC METABOLIC PANEL; Future  - EKG 12-lead complete w/read - Clinics    Hyperlipidemia LDL goal <100  Controlled with statin.      Risks and Recommendations  The patient has the following additional risks and recommendations for perioperative complications:  Diabetes:  - Patient is not on insulin therapy: regular NPO guidelines can be followed.     Antiplatelet or Anticoagulation Medication Instructions   - aspirin: Discontinue aspirin 7-10 days prior to procedure to reduce bleeding risk. It should be resumed postoperatively.     Additional Medication  Instructions   - ACE/ARB: DO NOT TAKE on day of surgery (minimum 11 hours for general anesthesia).   - Diuretics: DO NOT TAKE on the day of surgery.   - Statins: Continue taking on the day of surgery.    - metformin: DO NOT TAKE day of surgery.   - sulfonylurea (e.g. glyburide, glipizide): DO NOT TAKE day of surgery   - bisphosphonates (alendronate, ibandronate, risedronate): she already took this for the month   - Herbal medications and vitamins: DO NOT TAKE 14 days prior to surgery.   - ibuprofen (Advil, Motrin): DO NOT TAKE 1 day before surgery.     Recommendation  Surgery is not recommended due to uncontrolled diabetes. Stabilization required prior to elective surgery.       The longitudinal plan of care for the diagnosis(es)/condition(s) as documented were addressed during this visit. Due to the added complexity in care, I will continue to support Mackenzie in the subsequent management and with ongoing continuity of care.    Subjective   Mackenzie is a 79 year old, presenting for the following:  Pre-Op Exam          10/2/2024    10:16 AM   Additional Questions   Roomed by Maria Fernanda   Accompanied by Self     HPI related to upcoming procedure: right hip pain/arthritis. Failed CSI.        10/2/2024   Pre-Op Questionnaire   Have you ever had a heart attack or stroke? No   Have you ever had surgery on your heart or blood vessels, such as a stent placement, a coronary artery bypass, or surgery on an artery in your head, neck, heart, or legs? No   Do you have chest pain with activity? No   Do you have a history of heart failure? No   Do you currently have a cold, bronchitis or symptoms of other infection? No   Do you have a cough, shortness of breath, or wheezing? No   Do you or anyone in your family have previous history of blood clots? No   Do you or does anyone in your family have a serious bleeding problem such as prolonged bleeding following surgeries or cuts? No   Have you ever had problems with anemia or been told to  take iron pills? (!) YES - took iron during pregnancy   Have you had any abnormal blood loss such as black, tarry or bloody stools, or abnormal vaginal bleeding? No   Have you ever had a blood transfusion? No   Are you willing to have a blood transfusion if it is medically needed before, during, or after your surgery? Yes   Have you or any of your relatives ever had problems with anesthesia? (!) YES - postoperative nausea/vomiting   Do you have sleep apnea, excessive snoring or daytime drowsiness? No   Do you have any artifical heart valves or other implanted medical devices like a pacemaker, defibrillator, or continuous glucose monitor? No   Do you have artificial joints? No   Are you allergic to latex? No      Health Care Directive  Patient has a Health Care Directive on file      Preoperative Review of    reviewed - no record of controlled substances prescribed.    Status of Chronic Conditions:  See problem list for active medical problems.  Problems all longstanding and stable, except as noted/documented.  See ROS for pertinent symptoms related to these conditions.    DIABETES - Patient has a longstanding history of DiabetesType Type II . Patient is being treated with diet and oral agents and denies significant side effects. Control has been fair. Complicating factors include but are not limited to: hypertension and hyperlipidemia.     HYPERLIPIDEMIA - Patient has a long history of significant Hyperlipidemia requiring medication for treatment with recent good control. Patient reports no problems or side effects with the medication.     HYPERTENSION - Patient has longstanding history of HTN , currently denies any symptoms referable to elevated blood pressure. Specifically denies chest pain, palpitations, dyspnea, orthopnea, PND or peripheral edema. Blood pressure readings have been in normal range. Current medication regimen is as listed below. Patient denies any side effects of medication.     Patient  Active Problem List    Diagnosis Date Noted    Benign paroxysmal positional vertigo due to bilateral vestibular disorder 08/21/2024     Priority: Medium    Ventral hernia 06/23/2018     Priority: Medium    Benign essential hypertension 11/28/2016     Priority: Medium    Type 2 diabetes mellitus with hyperlipidemia (H) 10/25/2015     Priority: Medium    Hyperlipidemia LDL goal <100 01/23/2012     Priority: Medium    Type 2 diabetes, HbA1c goal < 7% (H) 01/23/2012     Priority: Medium    Diverticulosis of large intestine 10/10/2007     Priority: Medium     Problem list name updated by automated process. Provider to review      FAMILY HISTORY OF OSTEOPORSIS 04/20/2005     Priority: Medium     Mother taking meds  Problem list name updated by automated process. 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     Priority: Medium      Past Medical History:   Diagnosis Date    Benign essential hypertension     Diabetes (H) 2013    HTN (hypertension)     Need for prophylactic hormone replacement therapy (postmenopausal)     PONV (postoperative nausea and vomiting)      Past Surgical History:   Procedure Laterality Date    ARTHRODESIS FOOT Right 10/23/2014    Procedure: ARTHRODESIS FOOT;  Surgeon: Mauri Gomez DPM;  Location: [facility]    ARTHRODESIS FOOT Right 10/03/2019    Procedure: RIGHT 2ND METATARSAL PHALANGEAL JOINT REPAIR OF CAPSULE/STABILIZATION, 3RD TOE PROXIMAL INTERPHALANGEAL JOINT FUSION; 4TH TOE CORRECTION;  Surgeon: Mauri Gomez DPM;  Location: [facility]    BIOPSY BREAST      COLONOSCOPY  10/10/2007    Repeat in 5 years    COLONOSCOPY N/A 12/06/2018    Procedure: COLONOSCOPY;  Surgeon: Magno Huber MD;  Location: [facility]    LAPAROSCOPIC HERNIORRHAPHY VENTRAL N/A  06/25/2018    Procedure: LAPAROSCOPIC HERNIORRHAPHY VENTRAL;  LAPAROSCOPIC HERNIORRHAPHY VENTRAL ;  Surgeon: Jose Ibrahim MD;  Location: [facility]    LUMPECTOMY BREAST      SURGICAL HISTORY OF -   1996    Hysterectomy    SURGICAL HISTORY OF -   03/2002    Right hammer toe/bunionectomy     Current Outpatient Medications   Medication Sig Dispense Refill    ACCU-CHEK JULOI CÉSAR PLUS test strip TWICE DAILY 200 strip 1    aspirin 81 MG tablet Take  by mouth daily.      CALCIUM 500 + D OR 1 tablet twice daily      FLAX SEED OIL OR twice daily      FREESTYLE LANCETS MISC 1 each by Lancet route 2 times daily 100 each prn    glimepiride (AMARYL) 1 MG tablet Take 1 tablet (1 mg) by mouth every morning (before breakfast) Follow up visit 4/24 90 tablet 3    hydrochlorothiazide (HYDRODIURIL) 25 MG tablet Take 1 tablet (25 mg) by mouth daily 90 tablet 3    IBANdronate (BONIVA) 150 MG tablet Take 1 tablet (150 mg) by mouth every 30 days 3 tablet 4    losartan (COZAAR) 100 MG tablet Take 1 tablet (100 mg) by mouth daily 90 tablet 3    metFORMIN (GLUCOPHAGE XR) 500 MG 24 hr tablet Take 2 tablets (1,000 mg) by mouth 2 times daily (with meals) Follow up visit in april 360 tablet 3    pravastatin (PRAVACHOL) 40 MG tablet Take 1 tablet (40 mg) by mouth daily 90 tablet 3    meclizine (ANTIVERT) 25 MG tablet Take 1 tablet (25 mg) by mouth 3 times daily as needed for dizziness (Patient not taking: Reported on 10/2/2024) 90 tablet 1    ondansetron (ZOFRAN ODT) 4 MG ODT tab Take 1 tablet (4 mg) by mouth every 8 hours as needed for nausea (Patient not taking: Reported on 10/2/2024) 30 tablet 0    scopolamine (TRANSDERM) 1 MG/3DAYS 72 hr patch Place 1 patch onto the skin every 72 hours (Patient not taking: Reported on 10/2/2024) 3 patch 0       Allergies   Allergen Reactions    Nkda [No Known Drug Allergy]         Social History     Tobacco Use    Smoking status: Former     Current packs/day: 0.00     Types: Cigarettes     Quit date: 1/1/1971     " Years since quittin.7    Smokeless tobacco: Never   Substance Use Topics    Alcohol use: Yes     Comment: social     Family History   Problem Relation Age of Onset    Hypertension Mother     Osteoporosis Mother     Heart Disease Mother 94        small heart attack    Cancer Father         bone cancer    Hypertension Brother     Lipids Sister     Diabetes Sister         diet controlled    Cancer - colorectal Other 45        he  at age 45 from colon cancer    Hypertension Brother     Diabetes Brother     Lipids Brother     Cancer - colorectal Brother         in his 60s. tested negative genetic markers    Lipids Brother     Hypertension Sister     Asthma No family hx of     C.A.D. No family hx of     Cerebrovascular Disease No family hx of     Breast Cancer No family hx of     Prostate Cancer No family hx of      History   Drug Use No             Review of Systems  CONSTITUTIONAL: NEGATIVE for fever, chills, change in weight  INTEGUMENTARY/SKIN: NEGATIVE for worrisome rashes, moles or lesions  EYES: NEGATIVE for vision changes or irritation  ENT/MOUTH: NEGATIVE for ear, mouth and throat problems  RESP: NEGATIVE for significant cough or SOB  BREAST: NEGATIVE for masses, tenderness or discharge  CV: NEGATIVE for chest pain, palpitations or peripheral edema  GI: NEGATIVE for nausea, abdominal pain, heartburn, or change in bowel habits  : NEGATIVE for frequency, dysuria, or hematuria  MUSCULOSKELETAL:POSITIVE  for arthralgias right hip pain  NEURO: NEGATIVE for weakness, dizziness or paresthesias  ENDOCRINE: NEGATIVE for temperature intolerance, skin/hair changes  HEME: NEGATIVE for bleeding problems  PSYCHIATRIC: NEGATIVE for changes in mood or affect    Objective    BP (!) 163/88   Pulse 72   Temp 98.6  F (37  C) (Tympanic)   Resp 16   Ht 1.664 m (5' 5.51\")   Wt 80.1 kg (176 lb 8 oz)   SpO2 98%   BMI 28.91 kg/m     Estimated body mass index is 28.91 kg/m  as calculated from the following:    Height as " "of this encounter: 1.664 m (5' 5.51\").    Weight as of this encounter: 80.1 kg (176 lb 8 oz).  Physical Exam  GENERAL: alert and no distress  EYES: Eyes grossly normal to inspection, PERRL and conjunctivae and sclerae normal  HENT: ear canals and TM's normal, nose and mouth without ulcers or lesions  NECK: no adenopathy, no asymmetry, masses, or scars  RESP: lungs clear to auscultation - no rales, rhonchi or wheezes  CV: regular rate and rhythm, normal S1 S2, no S3 or S4, no murmur, click or rub, no peripheral edema  ABDOMEN: soft, nontender, no hepatosplenomegaly, no masses and bowel sounds normal  MS: no gross musculoskeletal defects noted, no edema  SKIN: no suspicious lesions or rashes  NEURO: Normal strength and tone, mentation intact and speech normal  PSYCH: mentation appears normal, affect normal/bright  Diabetic foot exam: normal DP and PT pulses, no trophic changes or ulcerative lesions, normal sensory exam, bunions, and right 3rd toe callus. No signs of infection or pain    Recent Labs   Lab Test 04/30/24  1026 10/09/23  1001   A1C 7.7* 7.7*        Diagnostics  Recent Results (from the past 168 hour(s))   BASIC METABOLIC PANEL    Collection Time: 10/02/24 11:39 AM   Result Value Ref Range    Sodium 130 (L) 135 - 145 mmol/L    Potassium 5.0 3.4 - 5.3 mmol/L    Chloride 94 (L) 98 - 107 mmol/L    Carbon Dioxide (CO2) 23 22 - 29 mmol/L    Anion Gap 13 7 - 15 mmol/L    Urea Nitrogen 9.0 8.0 - 23.0 mg/dL    Creatinine 0.49 (L) 0.51 - 0.95 mg/dL    GFR Estimate >90 >60 mL/min/1.73m2    Calcium 9.2 8.8 - 10.4 mg/dL    Glucose 146 (H) 70 - 99 mg/dL   Hemoglobin A1c    Collection Time: 10/02/24 11:39 AM   Result Value Ref Range    Estimated Average Glucose 189 (H) <117 mg/dL    Hemoglobin A1C 8.2 (H) 0.0 - 5.6 %   CBC with platelets and differential    Collection Time: 10/02/24 11:39 AM   Result Value Ref Range    WBC Count 4.8 4.0 - 11.0 10e3/uL    RBC Count 4.43 3.80 - 5.20 10e6/uL    Hemoglobin 12.6 11.7 - 15.7 " g/dL    Hematocrit 37.4 35.0 - 47.0 %    MCV 84 78 - 100 fL    MCH 28.4 26.5 - 33.0 pg    MCHC 33.7 31.5 - 36.5 g/dL    RDW 11.9 10.0 - 15.0 %    Platelet Count 263 150 - 450 10e3/uL    % Neutrophils 50 %    % Lymphocytes 34 %    % Monocytes 11 %    % Eosinophils 4 %    % Basophils 1 %    % Immature Granulocytes 0 %    Absolute Neutrophils 2.4 1.6 - 8.3 10e3/uL    Absolute Lymphocytes 1.6 0.8 - 5.3 10e3/uL    Absolute Monocytes 0.5 0.0 - 1.3 10e3/uL    Absolute Eosinophils 0.2 0.0 - 0.7 10e3/uL    Absolute Basophils 0.1 0.0 - 0.2 10e3/uL    Absolute Immature Granulocytes 0.0 <=0.4 10e3/uL      EKG: appears normal, NSR, 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 Delgado cardiologist on call who noted she would consider this normal sinus rhythm, no concerns with EKG. Patient is asymptomatic.    Revised Cardiac Risk Index (RCRI)  The patient has the following serious cardiovascular risks for perioperative complications:   - No serious cardiac risks = 0 points     RCRI Interpretation: 0 points: Class I (very low risk - 0.4% complication rate)         Signed Electronically by: Elizabeth Claire PA-C  A copy of this evaluation report is provided to the requesting physician.

## 2024-10-02 NOTE — TELEPHONE ENCOUNTER
I left a voicemail for [facility] Dr. Rodriges's team. I am thinking they will call back tomorrow when I am out of office. I just wanted to let them know that Mackenzie's A1C is 8.2 and sodium is 130. We can recheck sodium next week before surgery but want to make sure that they are okay with A1C 8.2.  Helen Claire PA-C

## 2024-10-03 NOTE — TELEPHONE ENCOUNTER
Theodore CHANDLER called back from [facility], I relayed the message about the labs, he is going to check with the surgeon and let us know any changes.     Lay Bergeron, CMA

## 2024-10-04 RX ORDER — IBANDRONATE SODIUM 150 MG/1
150 TABLET, FILM COATED ORAL
Qty: 3 TABLET | Refills: 4 | Status: SHIPPED | OUTPATIENT
Start: 2024-10-04

## 2024-10-04 RX ORDER — GLIMEPIRIDE 2 MG/1
2 TABLET ORAL
Qty: 90 TABLET | Refills: 0 | Status: SHIPPED | OUTPATIENT
Start: 2024-10-04

## 2024-10-04 NOTE — TELEPHONE ENCOUNTER
Requested Prescriptions   Pending Prescriptions Disp Refills    IBANdronate (BONIVA) 150 MG tablet [Pharmacy Med Name: ibandronate 150 mg tablet] 3 tablet 4     Sig: Take 1 tablet (150 mg) by mouth every 30 days       Bisphosphonates Failed - 10/2/2024  1:28 PM        Failed - Most recent Creatinine Clearance in last 12 months >35        Passed - Dexa scan completed in the past 48-months     Please review last Dexa result.           Passed - Medication is active on med list        Passed - Medication indicated for associated diagnosis     The medication is prescribed for one or more of the following conditions:     Osteoporosis   Osteitis Deformans (Paget's Disease)   Postmenopausal    Osteopenia   Arthroplasty   Crohn's Disease   Cystic Fibrosis   Fibrous Dysplasia of bone   Growth Hormone Deficiency   Hypercalcemia   Juvenile Osteoporosis   Hypogonadism          Passed - Recent (12 mo) or future (90 days) visit within the authorizing provider's specialty     The patient must have completed an in-person or virtual visit within the past 12 months or has a future visit scheduled within the next 90 days with the authorizing provider s specialty.  Urgent care and e-visits do not quality as an office visit for this protocol.          Passed - Patient is age 18 or older

## 2024-10-04 NOTE — TELEPHONE ENCOUNTER
I spoke to Theodore from [facility]. He will call Mackenzie as well.to inform her.  They would like A1C 7.5 to be optimized prior to surgery.  Surgery will need to be postponed unfortunately.  Recommend increase glimepiride. She is taking 1 mg daily, I sent in new prescription for 2 mg daily (she can also take 2 of the 1 mg pills). Start checking blood sugars daily. Call us in 2 weeks with blood sugar readings. Could increase to 4 mg if she needs to at that point.  Monitor for low blood sugar.    Recommend recheck A1C in 1 month.      The American Diabetes Association suggests the following targets for most nonpregnant adults with diabetes. More or less stringent glycemic goals may be appropriate for each individual.  Before a meal (preprandial plasma glucose):  mg/dl   1-2 hours after beginning of the meal (Postprandial plasma glucose)*: Less than 180 mg/dl    Helen Claire PA-C

## 2024-10-04 NOTE — TELEPHONE ENCOUNTER
Call placed to patient  No answer; voicemail left requesting call back to Clinic RN at [phone]    Naihd Clark, Clinic RN  [facility]

## 2024-10-07 ENCOUNTER — LAB (OUTPATIENT)
Dept: LAB | Facility: CLINIC | Age: 79
End: 2024-10-07
Payer: MEDICARE

## 2024-10-07 DIAGNOSIS — E11.69 TYPE 2 DIABETES MELLITUS WITH HYPERLIPIDEMIA (H): ICD-10-CM

## 2024-10-07 DIAGNOSIS — E87.1 HYPONATREMIA: ICD-10-CM

## 2024-10-07 DIAGNOSIS — E78.5 TYPE 2 DIABETES MELLITUS WITH HYPERLIPIDEMIA (H): ICD-10-CM

## 2024-10-07 LAB
ANION GAP SERPL CALCULATED.3IONS-SCNC: 14 MMOL/L (ref 7–15)
BUN SERPL-MCNC: 12.5 MG/DL (ref 8–23)
CALCIUM SERPL-MCNC: 9.7 MG/DL (ref 8.8–10.4)
CHLORIDE SERPL-SCNC: 93 MMOL/L (ref 98–107)
CREAT SERPL-MCNC: 0.62 MG/DL (ref 0.51–0.95)
EGFRCR SERPLBLD CKD-EPI 2021: >90 ML/MIN/1.73M2
EST. AVERAGE GLUCOSE BLD GHB EST-MCNC: 186 MG/DL
GLUCOSE SERPL-MCNC: 194 MG/DL (ref 70–99)
HBA1C MFR BLD: 8.1 % (ref 0–5.6)
HCO3 SERPL-SCNC: 25 MMOL/L (ref 22–29)
POTASSIUM SERPL-SCNC: 4.3 MMOL/L (ref 3.4–5.3)
SODIUM SERPL-SCNC: 132 MMOL/L (ref 135–145)

## 2024-10-07 PROCEDURE — 80048 BASIC METABOLIC PNL TOTAL CA: CPT

## 2024-10-07 PROCEDURE — 36415 COLL VENOUS BLD VENIPUNCTURE: CPT

## 2024-10-07 PROCEDURE — 83036 HEMOGLOBIN GLYCOSYLATED A1C: CPT

## 2024-10-07 NOTE — TELEPHONE ENCOUNTER
Call placed to patient   Relayed Noman's message     Patient verbalized understanding  Lab appointment scheduled for today  No further questions/concerns    Nahid Clark, Clinic RN  [facility]

## 2024-10-15 DIAGNOSIS — I10 BENIGN ESSENTIAL HYPERTENSION: ICD-10-CM

## 2024-10-15 RX ORDER — LOSARTAN POTASSIUM 100 MG/1
100 TABLET ORAL DAILY
Qty: 90 TABLET | Refills: 3 | Status: SHIPPED | OUTPATIENT
Start: 2024-10-15

## 2024-10-17 PROBLEM — H81.13 BENIGN PAROXYSMAL POSITIONAL VERTIGO DUE TO BILATERAL VESTIBULAR DISORDER: Status: RESOLVED | Noted: 2024-08-21 | Resolved: 2024-10-17

## 2024-10-24 DIAGNOSIS — E11.9 TYPE 2 DIABETES, HBA1C GOAL < 7% (H): ICD-10-CM

## 2024-10-24 RX ORDER — BLOOD SUGAR DIAGNOSTIC
STRIP MISCELLANEOUS
Qty: 200 STRIP | Refills: 1 | Status: SHIPPED | OUTPATIENT
Start: 2024-10-24

## 2024-10-30 ENCOUNTER — LAB (OUTPATIENT)
Dept: LAB | Facility: CLINIC | Age: 79
End: 2024-10-30
Payer: MEDICARE

## 2024-10-30 DIAGNOSIS — E87.1 HYPONATREMIA: ICD-10-CM

## 2024-10-30 DIAGNOSIS — E11.69 TYPE 2 DIABETES MELLITUS WITH HYPERLIPIDEMIA (H): ICD-10-CM

## 2024-10-30 DIAGNOSIS — E78.5 TYPE 2 DIABETES MELLITUS WITH HYPERLIPIDEMIA (H): ICD-10-CM

## 2024-10-30 LAB
ANION GAP SERPL CALCULATED.3IONS-SCNC: 13 MMOL/L (ref 7–15)
BUN SERPL-MCNC: 10.5 MG/DL (ref 8–23)
CALCIUM SERPL-MCNC: 9.3 MG/DL (ref 8.8–10.4)
CHLORIDE SERPL-SCNC: 94 MMOL/L (ref 98–107)
CREAT SERPL-MCNC: 0.6 MG/DL (ref 0.51–0.95)
EGFRCR SERPLBLD CKD-EPI 2021: >90 ML/MIN/1.73M2
EST. AVERAGE GLUCOSE BLD GHB EST-MCNC: 169 MG/DL
GLUCOSE SERPL-MCNC: 163 MG/DL (ref 70–99)
HBA1C MFR BLD: 7.5 % (ref 0–5.6)
HCO3 SERPL-SCNC: 24 MMOL/L (ref 22–29)
POTASSIUM SERPL-SCNC: 4 MMOL/L (ref 3.4–5.3)
SODIUM SERPL-SCNC: 131 MMOL/L (ref 135–145)

## 2024-10-30 PROCEDURE — 83036 HEMOGLOBIN GLYCOSYLATED A1C: CPT

## 2024-10-30 PROCEDURE — 36415 COLL VENOUS BLD VENIPUNCTURE: CPT

## 2024-10-30 PROCEDURE — 80048 BASIC METABOLIC PNL TOTAL CA: CPT

## 2024-11-18 ENCOUNTER — TELEPHONE (OUTPATIENT)
Dept: FAMILY MEDICINE | Facility: CLINIC | Age: 79
End: 2024-11-18
Payer: MEDICARE

## 2024-11-18 NOTE — TELEPHONE ENCOUNTER
Received  note that patient is now having surgery on Thursday.  I do think she would be required to have another preop as hers was >30 days ago.  Her PCP Dr. Art has an appointment tomorrow.  Helen Claire PA-C

## 2024-11-19 ENCOUNTER — OFFICE VISIT (OUTPATIENT)
Dept: FAMILY MEDICINE | Facility: CLINIC | Age: 79
End: 2024-11-19
Payer: MEDICARE

## 2024-11-19 VITALS
TEMPERATURE: 98.1 F | OXYGEN SATURATION: 98 % | HEART RATE: 74 BPM | SYSTOLIC BLOOD PRESSURE: 126 MMHG | BODY MASS INDEX: 27.8 KG/M2 | DIASTOLIC BLOOD PRESSURE: 78 MMHG | RESPIRATION RATE: 18 BRPM | HEIGHT: 66 IN | WEIGHT: 173 LBS

## 2024-11-19 DIAGNOSIS — M16.11 ARTHRITIS OF RIGHT HIP: ICD-10-CM

## 2024-11-19 DIAGNOSIS — E78.5 TYPE 2 DIABETES MELLITUS WITH HYPERLIPIDEMIA (H): ICD-10-CM

## 2024-11-19 DIAGNOSIS — Z01.818 PREOP GENERAL PHYSICAL EXAM: Primary | ICD-10-CM

## 2024-11-19 DIAGNOSIS — I10 BENIGN ESSENTIAL HYPERTENSION: ICD-10-CM

## 2024-11-19 DIAGNOSIS — E11.69 TYPE 2 DIABETES MELLITUS WITH HYPERLIPIDEMIA (H): ICD-10-CM

## 2024-11-19 LAB
ANION GAP SERPL CALCULATED.3IONS-SCNC: 12 MMOL/L (ref 7–15)
BUN SERPL-MCNC: 12.7 MG/DL (ref 8–23)
CALCIUM SERPL-MCNC: 9.8 MG/DL (ref 8.8–10.4)
CHLORIDE SERPL-SCNC: 95 MMOL/L (ref 98–107)
CREAT SERPL-MCNC: 0.58 MG/DL (ref 0.51–0.95)
EGFRCR SERPLBLD CKD-EPI 2021: >90 ML/MIN/1.73M2
ERYTHROCYTE [DISTWIDTH] IN BLOOD BY AUTOMATED COUNT: 12.2 % (ref 10–15)
GLUCOSE SERPL-MCNC: 114 MG/DL (ref 70–99)
HCO3 SERPL-SCNC: 28 MMOL/L (ref 22–29)
HCT VFR BLD AUTO: 37.8 % (ref 35–47)
HGB BLD-MCNC: 12.3 G/DL (ref 11.7–15.7)
MCH RBC QN AUTO: 28.2 PG (ref 26.5–33)
MCHC RBC AUTO-ENTMCNC: 32.5 G/DL (ref 31.5–36.5)
MCV RBC AUTO: 87 FL (ref 78–100)
PLATELET # BLD AUTO: 146 10E3/UL (ref 150–450)
POTASSIUM SERPL-SCNC: 3.9 MMOL/L (ref 3.4–5.3)
RBC # BLD AUTO: 4.36 10E6/UL (ref 3.8–5.2)
SODIUM SERPL-SCNC: 135 MMOL/L (ref 135–145)
WBC # BLD AUTO: 4.7 10E3/UL (ref 4–11)

## 2024-11-19 PROCEDURE — 36415 COLL VENOUS BLD VENIPUNCTURE: CPT | Performed by: FAMILY MEDICINE

## 2024-11-19 PROCEDURE — 85027 COMPLETE CBC AUTOMATED: CPT | Performed by: FAMILY MEDICINE

## 2024-11-19 PROCEDURE — 80048 BASIC METABOLIC PNL TOTAL CA: CPT | Performed by: FAMILY MEDICINE

## 2024-11-19 PROCEDURE — 99214 OFFICE O/P EST MOD 30 MIN: CPT | Performed by: FAMILY MEDICINE

## 2024-11-19 ASSESSMENT — PAIN SCALES - GENERAL: PAINLEVEL_OUTOF10: EXTREME PAIN (8)

## 2024-11-19 NOTE — PATIENT INSTRUCTIONS
How to Take Your Medication Before Surgery  Preoperative Medication Instructions   Antiplatelet or Anticoagulation Medication Instructions   - Patient is on no antiplatelet or anticoagulation medications.    Additional Medication Instructions        Additional Medication Instructions   - ACE/ARB: DO NOT TAKE on day of surgery (minimum 11 hours for general anesthesia).   - Diuretics: DO NOT TAKE on the day of surgery.   - Statins: Continue taking on the day of surgery.    - metformin: DO NOT TAKE day of surgery.   - sulfonylurea (e.g. glyburide, glipizide): DO NOT TAKE day of surgery   - bisphosphonates (alendronate, ibandronate, risedronate): she already took this for the month   - Herbal medications and vitamins: DO NOT TAKE 14 days prior to surgery.   - ibuprofen (Advil, Motrin): DO NOT TAKE 1 day before surgery.     Patient Education   Preparing for Your Surgery  For Adults  Getting started  In most cases, a nurse will call to review your health history and instructions. They will give you an arrival time based on your scheduled surgery time. Please be ready to share:  Your doctor's clinic name and phone number  Your medical, surgical, and anesthesia history  A list of allergies and sensitivities  A list of medicines, including herbal treatments and over-the-counter drugs  Whether the patient has a legal guardian (ask how to send us the papers in advance)  Note: You may not receive a call if you were seen at our PAC (Preoperative Assessment Center).  Please tell us if you're pregnant--or if there's any chance you might be pregnant. Some surgeries may injure a fetus (unborn baby), so they require a pregnancy test. Surgeries that are safe for a fetus don't always need a test, and you can choose whether to have one.   Preparing for surgery  Within 10 to 30 days of surgery: Have a pre-op exam (sometimes called an H&P, or History and Physical). This can be done at a clinic or pre-operative center.  If you're having a  , you may not need this exam. Talk to your care team.  At your pre-op exam, talk to your care team about all medicines you take. (This includes CBD oil and any drugs, such as THC, marijuana, and other forms of cannabis.) If you need to stop any medicine before surgery, ask when to start taking it again.  This is for your safety. Many medicines and drugs can make you bleed too much during surgery. Some change how well surgery (anesthesia) drugs work.  Call your insurance company to let them know you're having surgery. (If you don't have insurance, call [phone].)  Call your clinic if there's any change in your health. This includes a scrape or scratch near the surgery site, or any signs of a cold (sore throat, runny nose, cough, rash, fever).  Eating and drinking guidelines  For your safety: Unless your surgeon tells you otherwise, follow the guidelines below.  Eat and drink as normal until 8 hours before you arrive for surgery. After that, no food or milk. You can spit out gum when you arrive.  Drink clear liquids until 2 hours before you arrive. These are liquids you can see through, like water, Gatorade, and Propel Water. They also include plain black coffee and tea (no cream or milk).  No alcohol for 24 hours before you arrive. The night before surgery, stop any drinks that contain THC.  If your care team tells you to take medicine on the morning of surgery, it's okay to take it with a sip of water. No other medicines or drugs are allowed (including CBD oil)--follow your care team's instructions.  If you have questions the day of surgery, call your hospital or surgery center.   Preventing infection  Shower or bathe the night before and the morning of surgery. Follow the instructions your clinic gave you. (If no instructions, use regular soap.)  Don't shave or clip hair near your surgery site. We'll remove the hair if needed.  Don't smoke or vape the morning of surgery. No chewing tobacco for 6 hours  before you arrive. A nicotine patch is okay. You may spit out nicotine gum when you arrive.  For some surgeries, the surgeon will tell you to fully quit smoking and nicotine.  We will make every effort to keep you safe from infection. We will:  Clean our hands often with soap and water (or an alcohol-based hand rub).  Clean the skin at your surgery site with a special soap that kills germs.  Give you a special gown to keep you warm. (Cold raises the risk of infection.)  Wear hair covers, masks, gowns, and gloves during surgery.  Give antibiotic medicine, if prescribed. Not all surgeries need this medicine.  What to bring on the day of surgery  Photo ID and insurance card  Copy of your health care directive, if you have one  Glasses and hearing aids (bring cases)  You can't wear contacts during surgery  Inhaler and eye drops, if you use them (tell us about these when you arrive)  CPAP machine or breathing device, if you use them  A few personal items, if spending the night  If you have . . .  A pacemaker, ICD (cardiac defibrillator), or other implant: Bring the ID card.  An implanted stimulator: Bring the remote control.  A legal guardian: Bring a copy of the certified (court-stamped) guardianship papers.  Please remove any jewelry, including body piercings. Leave jewelry and other valuables at home.  If you're going home the day of surgery  You must have a responsible adult drive you home. They should stay with you overnight as well.  If you don't have someone to stay with you, and you aren't safe to go home alone, we may keep you overnight. Insurance often won't pay for this.  After surgery  If it's hard to control your pain or you need more pain medicine, please call your surgeon's office.  Questions?   If you have any questions for your care team, list them here:    ____________________________________________________________________________________________________________________________________________________________________________________________________________________________________________________________  For informational purposes only. Not to replace the advice of your health care provider. Copyright   2003, 2019 [facility]. All rights reserved. Clinically reviewed by Javi Yuan MD. [employer] [id number] - REV 08/24.

## 2024-12-03 ENCOUNTER — TRANSFERRED RECORDS (OUTPATIENT)
Dept: HEALTH INFORMATION MANAGEMENT | Facility: CLINIC | Age: 79
End: 2024-12-03
Payer: MEDICARE

## 2024-12-17 ENCOUNTER — TRANSFERRED RECORDS (OUTPATIENT)
Dept: HEALTH INFORMATION MANAGEMENT | Facility: CLINIC | Age: 79
End: 2024-12-17
Payer: MEDICARE

## 2025-01-14 DIAGNOSIS — E78.5 TYPE 2 DIABETES MELLITUS WITH HYPERLIPIDEMIA (H): ICD-10-CM

## 2025-01-14 DIAGNOSIS — E11.69 TYPE 2 DIABETES MELLITUS WITH HYPERLIPIDEMIA (H): ICD-10-CM

## 2025-01-14 RX ORDER — GLIMEPIRIDE 2 MG/1
2 TABLET ORAL
Qty: 90 TABLET | Refills: 0 | Status: SHIPPED | OUTPATIENT
Start: 2025-01-14

## 2025-02-24 ENCOUNTER — TRANSFERRED RECORDS (OUTPATIENT)
Dept: HEALTH INFORMATION MANAGEMENT | Facility: CLINIC | Age: 80
End: 2025-02-24
Payer: MEDICARE

## 2025-03-03 ENCOUNTER — PATIENT OUTREACH (OUTPATIENT)
Dept: CARE COORDINATION | Facility: CLINIC | Age: 80
End: 2025-03-03
Payer: MEDICARE

## 2025-03-11 ENCOUNTER — TELEPHONE (OUTPATIENT)
Dept: FAMILY MEDICINE | Facility: CLINIC | Age: 80
End: 2025-03-11
Payer: MEDICARE

## 2025-03-11 NOTE — TELEPHONE ENCOUNTER
Reason for Call:  Appointment Request    Patient requesting this type of appt:  Shortness of breath and no energy and not feeling very good (asked if pt would like to speak to a nurse, they said they would rather schedule an appt)    Requested provider:  Dr Art or Elizabeth Claire    Reason patient unable to be scheduled: Not within requested timeframe    When does patient want to be seen/preferred time: 3-7 days    Comments:      Could we send this information to you in [facility] or would you prefer to receive a phone call?:   Patient would prefer a phone call   Okay to leave a detailed message?: Yes at Cell number on file:    Telephone Information:   Mobile [phone]       Call taken on 3/11/2025 at 12:06 PM by Iliana Dong

## 2025-03-11 NOTE — TELEPHONE ENCOUNTER
Call placed to Patient.  Had hip surgery in November and was doing pretty good.  About last month has little energy.  Can walk a mile with cane but needs to take breaks.  Feels like she needs to be seen  Appointment scheduled for 3/12/24 at 1:40 with ANA Claire.  Gilberto HANNA, Clinic RN   [facility]

## 2025-04-02 ENCOUNTER — TRANSFERRED RECORDS (OUTPATIENT)
Dept: HEALTH INFORMATION MANAGEMENT | Facility: CLINIC | Age: 80
End: 2025-04-02
Payer: MEDICARE

## 2025-04-02 LAB — RETINOPATHY: NEGATIVE

## 2025-04-21 DIAGNOSIS — E78.5 TYPE 2 DIABETES MELLITUS WITH HYPERLIPIDEMIA (H): ICD-10-CM

## 2025-04-21 DIAGNOSIS — E11.69 TYPE 2 DIABETES MELLITUS WITH HYPERLIPIDEMIA (H): ICD-10-CM

## 2025-04-21 RX ORDER — GLIMEPIRIDE 2 MG/1
2 TABLET ORAL
Qty: 90 TABLET | Refills: 0 | Status: SHIPPED | OUTPATIENT
Start: 2025-04-21

## 2025-04-23 ENCOUNTER — PATIENT OUTREACH (OUTPATIENT)
Dept: CARE COORDINATION | Facility: CLINIC | Age: 80
End: 2025-04-23
Payer: MEDICARE

## 2025-05-07 ENCOUNTER — PATIENT OUTREACH (OUTPATIENT)
Dept: CARE COORDINATION | Facility: CLINIC | Age: 80
End: 2025-05-07
Payer: MEDICARE

## 2025-05-10 ENCOUNTER — HEALTH MAINTENANCE LETTER (OUTPATIENT)
Age: 80
End: 2025-05-10

## 2025-05-13 ENCOUNTER — PATIENT OUTREACH (OUTPATIENT)
Dept: CARE COORDINATION | Facility: CLINIC | Age: 80
End: 2025-05-13
Payer: MEDICARE

## 2025-05-23 ENCOUNTER — TRANSFERRED RECORDS (OUTPATIENT)
Dept: HEALTH INFORMATION MANAGEMENT | Facility: CLINIC | Age: 80
End: 2025-05-23
Payer: MEDICARE

## 2025-05-28 ENCOUNTER — TRANSFERRED RECORDS (OUTPATIENT)
Dept: HEALTH INFORMATION MANAGEMENT | Facility: CLINIC | Age: 80
End: 2025-05-28

## 2025-06-04 ENCOUNTER — OFFICE VISIT (OUTPATIENT)
Dept: FAMILY MEDICINE | Facility: CLINIC | Age: 80
End: 2025-06-04
Payer: MEDICARE

## 2025-06-04 VITALS
BODY MASS INDEX: 29.02 KG/M2 | OXYGEN SATURATION: 99 % | TEMPERATURE: 98.3 F | SYSTOLIC BLOOD PRESSURE: 136 MMHG | HEIGHT: 64 IN | WEIGHT: 170 LBS | HEART RATE: 74 BPM | RESPIRATION RATE: 12 BRPM | DIASTOLIC BLOOD PRESSURE: 78 MMHG

## 2025-06-04 DIAGNOSIS — Z00.00 ENCOUNTER FOR MEDICARE ANNUAL WELLNESS EXAM: Primary | ICD-10-CM

## 2025-06-04 DIAGNOSIS — E78.5 TYPE 2 DIABETES MELLITUS WITH HYPERLIPIDEMIA (H): ICD-10-CM

## 2025-06-04 DIAGNOSIS — I10 BENIGN ESSENTIAL HYPERTENSION: ICD-10-CM

## 2025-06-04 DIAGNOSIS — Z12.31 VISIT FOR SCREENING MAMMOGRAM: ICD-10-CM

## 2025-06-04 DIAGNOSIS — M85.851 OSTEOPENIA OF BOTH HIPS: ICD-10-CM

## 2025-06-04 DIAGNOSIS — M85.852 OSTEOPENIA OF BOTH HIPS: ICD-10-CM

## 2025-06-04 DIAGNOSIS — R39.15 URINARY URGENCY: ICD-10-CM

## 2025-06-04 DIAGNOSIS — R06.02 SHORTNESS OF BREATH: ICD-10-CM

## 2025-06-04 DIAGNOSIS — E78.5 HYPERLIPIDEMIA LDL GOAL <100: ICD-10-CM

## 2025-06-04 DIAGNOSIS — E11.69 TYPE 2 DIABETES MELLITUS WITH HYPERLIPIDEMIA (H): ICD-10-CM

## 2025-06-04 DIAGNOSIS — E11.9 TYPE 2 DIABETES, HBA1C GOAL < 7% (H): ICD-10-CM

## 2025-06-04 LAB
CHOLEST SERPL-MCNC: 166 MG/DL
CREAT UR-MCNC: 83.9 MG/DL
EST. AVERAGE GLUCOSE BLD GHB EST-MCNC: 151 MG/DL
FASTING STATUS PATIENT QL REPORTED: YES
HBA1C MFR BLD: 6.9 % (ref 0–5.6)
HDLC SERPL-MCNC: 80 MG/DL
LDLC SERPL CALC-MCNC: 64 MG/DL
MICROALBUMIN UR-MCNC: 14.1 MG/L
MICROALBUMIN/CREAT UR: 16.81 MG/G CR (ref 0–25)
NONHDLC SERPL-MCNC: 86 MG/DL
TRIGL SERPL-MCNC: 110 MG/DL

## 2025-06-04 PROCEDURE — 82043 UR ALBUMIN QUANTITATIVE: CPT | Performed by: FAMILY MEDICINE

## 2025-06-04 PROCEDURE — 83036 HEMOGLOBIN GLYCOSYLATED A1C: CPT | Performed by: FAMILY MEDICINE

## 2025-06-04 PROCEDURE — 3078F DIAST BP <80 MM HG: CPT | Performed by: FAMILY MEDICINE

## 2025-06-04 PROCEDURE — 80061 LIPID PANEL: CPT | Performed by: FAMILY MEDICINE

## 2025-06-04 PROCEDURE — G0439 PPPS, SUBSEQ VISIT: HCPCS | Performed by: FAMILY MEDICINE

## 2025-06-04 PROCEDURE — 99214 OFFICE O/P EST MOD 30 MIN: CPT | Mod: 25 | Performed by: FAMILY MEDICINE

## 2025-06-04 PROCEDURE — 82570 ASSAY OF URINE CREATININE: CPT | Performed by: FAMILY MEDICINE

## 2025-06-04 PROCEDURE — 3044F HG A1C LEVEL LT 7.0%: CPT | Performed by: FAMILY MEDICINE

## 2025-06-04 PROCEDURE — 36415 COLL VENOUS BLD VENIPUNCTURE: CPT | Performed by: FAMILY MEDICINE

## 2025-06-04 PROCEDURE — 3075F SYST BP GE 130 - 139MM HG: CPT | Performed by: FAMILY MEDICINE

## 2025-06-04 RX ORDER — GLIMEPIRIDE 2 MG/1
2 TABLET ORAL
Qty: 90 TABLET | Refills: 3 | Status: SHIPPED | OUTPATIENT
Start: 2025-06-04

## 2025-06-04 RX ORDER — BLOOD SUGAR DIAGNOSTIC
STRIP MISCELLANEOUS
Qty: 200 STRIP | Refills: 3 | Status: SHIPPED | OUTPATIENT
Start: 2025-06-04

## 2025-06-04 RX ORDER — HYDROCHLOROTHIAZIDE 25 MG/1
25 TABLET ORAL DAILY
Qty: 90 TABLET | Refills: 3 | Status: SHIPPED | OUTPATIENT
Start: 2025-06-04

## 2025-06-04 RX ORDER — METFORMIN HYDROCHLORIDE 500 MG/1
1000 TABLET, EXTENDED RELEASE ORAL 2 TIMES DAILY WITH MEALS
Qty: 360 TABLET | Refills: 3 | Status: SHIPPED | OUTPATIENT
Start: 2025-06-04

## 2025-06-04 RX ORDER — LOSARTAN POTASSIUM 100 MG/1
100 TABLET ORAL DAILY
Qty: 90 TABLET | Refills: 3 | Status: SHIPPED | OUTPATIENT
Start: 2025-06-04

## 2025-06-04 RX ORDER — IBANDRONATE SODIUM 150 MG/1
150 TABLET, FILM COATED ORAL
Qty: 3 TABLET | Refills: 4 | Status: SHIPPED | OUTPATIENT
Start: 2025-06-04

## 2025-06-04 RX ORDER — PRAVASTATIN SODIUM 40 MG
40 TABLET ORAL DAILY
Qty: 90 TABLET | Refills: 3 | Status: SHIPPED | OUTPATIENT
Start: 2025-06-04

## 2025-06-04 RX ORDER — TOLTERODINE 2 MG/1
2 CAPSULE, EXTENDED RELEASE ORAL DAILY
Qty: 30 CAPSULE | Refills: 3 | Status: SHIPPED | OUTPATIENT
Start: 2025-06-04

## 2025-06-04 SDOH — HEALTH STABILITY: PHYSICAL HEALTH: ON AVERAGE, HOW MANY MINUTES DO YOU ENGAGE IN EXERCISE AT THIS LEVEL?: 20 MIN

## 2025-06-04 SDOH — HEALTH STABILITY: PHYSICAL HEALTH: ON AVERAGE, HOW MANY DAYS PER WEEK DO YOU ENGAGE IN MODERATE TO STRENUOUS EXERCISE (LIKE A BRISK WALK)?: 3 DAYS

## 2025-06-04 ASSESSMENT — SOCIAL DETERMINANTS OF HEALTH (SDOH)
HOW OFTEN DO YOU GET TOGETHER WITH FRIENDS OR RELATIVES?: THREE TIMES A WEEK
HOW OFTEN DO YOU GET TOGETHER WITH FRIENDS OR RELATIVES?: THREE TIMES A WEEK

## 2025-06-04 NOTE — PATIENT INSTRUCTIONS
Patient Education   Preventive Care Advice   This is general advice given by our system to help you stay healthy. However, your care team may have specific advice just for you. Please talk to your care team about your preventive care needs.  Nutrition  Eat 5 or more servings of fruits and vegetables each day.  Try wheat bread, brown rice and whole grain pasta (instead of white bread, rice, and pasta).  Get enough calcium and vitamin D. Check the label on foods and aim for 100% of the RDA (recommended daily allowance).  Lifestyle  Exercise at least 150 minutes each week  (30 minutes a day, 5 days a week).  Do muscle strengthening activities 2 days a week. These help control your weight and prevent disease.  No smoking.  Wear sunscreen to prevent skin cancer.  Have a dental exam and cleaning every 6 months.  Yearly exams  See your health care team every year to talk about:  Any changes in your health.  Any medicines your care team has prescribed.  Preventive care, family planning, and ways to prevent chronic diseases.  Shots (vaccines)   HPV shots (up to age 26), if you've never had them before.  Hepatitis B shots (up to age 59), if you've never had them before.  COVID-19 shot: Get this shot when it's due.  Flu shot: Get a flu shot every year.  Tetanus shot: Get a tetanus shot every 10 years.  Pneumococcal, hepatitis A, and RSV shots: Ask your care team if you need these based on your risk.  Shingles shot (for age 50 and up)  General health tests  Diabetes screening:  Starting at age 35, Get screened for diabetes at least every 3 years.  If you are younger than age 35, ask your care team if you should be screened for diabetes.  Cholesterol test: At age 39, start having a cholesterol test every 5 years, or more often if advised.  Bone density scan (DEXA): At age 50, ask your care team if you should have this scan for osteoporosis (brittle bones).  Hepatitis C: Get tested at least once in your life.  STIs (sexually  transmitted infections)  Before age 24: Ask your care team if you should be screened for STIs.  After age 24: Get screened for STIs if you're at risk. You are at risk for STIs (including HIV) if:  You are sexually active with more than one person.  You don't use condoms every time.  You or a partner was diagnosed with a sexually transmitted infection.  If you are at risk for HIV, ask about PrEP medicine to prevent HIV.  Get tested for HIV at least once in your life, whether you are at risk for HIV or not.  Cancer screening tests  Cervical cancer screening: If you have a cervix, begin getting regular cervical cancer screening tests starting at age 21.  Breast cancer scan (mammogram): If you've ever had breasts, begin having regular mammograms starting at age 40. This is a scan to check for breast cancer.  Colon cancer screening: It is important to start screening for colon cancer at age 45.  Have a colonoscopy test every 10 years (or more often if you're at risk) Or, ask your provider about stool tests like a FIT test every year or Cologuard test every 3 years.  To learn more about your testing options, visit:   .  For help making a decision, visit:   https://bit.ly/rb92366.  Prostate cancer screening test: If you have a prostate, ask your care team if a prostate cancer screening test (PSA) at age 55 is right for you.  Lung cancer screening: If you are a current or former smoker ages 50 to 80, ask your care team if ongoing lung cancer screenings are right for you.  For informational purposes only. Not to replace the advice of your health care provider. Copyright   2023 [facility]. All rights reserved. Clinically reviewed by the [facility]. [employer] [id number] - REV 01/24.  Learning About Sleeping Well  What does sleeping well mean?     Sleeping well means getting enough sleep to feel good and stay healthy. How much sleep is enough varies among people.  The number of hours you  sleep and how you feel when you wake up are both important. If you do not feel refreshed, you probably need more sleep. Another sign of not getting enough sleep is feeling tired during the day.  Experts recommend that adults get at least 7 or more hours of sleep per day. Children and older adults need more sleep.  Why is getting enough sleep important?  Getting enough quality sleep is a basic part of good health. When your sleep suffers, your physical health, mood, and your thoughts can suffer too. You may find yourself feeling more grumpy or stressed. Not getting enough sleep also can lead to serious problems, including injury, accidents, anxiety, and depression.  What might cause poor sleeping?  Many things can cause sleep problems, including:  Changes to your sleep schedule.  Stress. Stress can be caused by fear about a single event, such as giving a speech. Or you may have ongoing stress, such as worry about work or school.  Depression, anxiety, and other mental or emotional conditions.  Changes in your sleep habits or surroundings. This includes changes that happen where you sleep, such as noise, light, or sleeping in a different bed. It also includes changes in your sleep pattern, such as having jet lag or working a late shift.  Health problems, such as pain, breathing problems, and restless legs syndrome.  Lack of regular exercise.  Using alcohol, nicotine, or caffeine before bed.  How can you help yourself?  Here are some tips that may help you sleep more soundly and wake up feeling more refreshed.  Your sleeping area   Use your bedroom only for sleeping and sex. A bit of light reading may help you fall asleep. But if it doesn't, do your reading elsewhere in the house. Try not to use your TV, computer, smartphone, or tablet while you are in bed.  Be sure your bed is big enough to stretch out comfortably, especially if you have a sleep partner.  Keep your bedroom quiet, dark, and cool. Use curtains, blinds,  "or a sleep mask to block out light. To block out noise, use earplugs, soothing music, or a \"white noise\" machine.  Your evening and bedtime routine   Create a relaxing bedtime routine. You might want to take a warm shower or bath, or listen to soothing music.  Go to bed at the same time every night. And get up at the same time every morning, even if you feel tired.  What to avoid   Limit caffeine (coffee, tea, caffeinated sodas) during the day, and don't have any for at least 6 hours before bedtime.  Avoid drinking alcohol before bedtime. Alcohol can cause you to wake up more often during the night.  Try not to smoke or use tobacco, especially in the evening. Nicotine can keep you awake.  Limit naps during the day, especially close to bedtime.  Avoid lying in bed awake for too long. If you can't fall asleep or if you wake up in the middle of the night and can't get back to sleep within about 20 minutes, get out of bed and go to another room until you feel sleepy.  Avoid taking medicine right before bed that may keep you awake or make you feel hyper or energized. Your doctor can tell you if your medicine may do this and if you can take it earlier in the day.  If you can't sleep   Imagine yourself in a peaceful, pleasant scene. Focus on the details and feelings of being in a place that is relaxing.  Get up and do a quiet or boring activity until you feel sleepy.  Avoid drinking any liquids before going to bed to help prevent waking up often to use the bathroom.  Where can you learn more?  Go to https://www.[employer].net/patiented  Enter J942 in the search box to learn more about \"Learning About Sleeping Well.\"  Current as of: July 31, 2024  Content Version: 14.4    5041-8495 [employer].   Care instructions adapted under license by your healthcare professional. If you have questions about a medical condition or this instruction, always ask your healthcare professional. [employer] disclaims any " warranty or liability for your use of this information.    Bladder Training: Care Instructions  Your Care Instructions     Bladder training is used to treat urge incontinence and stress incontinence. Urge incontinence means that the need to urinate comes on so fast that you can't get to a toilet in time. Stress incontinence means that you leak urine because of pressure on your bladder. For example, it may happen when you laugh, cough, or lift something heavy.  Bladder training can increase how long you can wait before you have to urinate. It can also help your bladder hold more urine. And it can give you better control over the urge to urinate.  It is important to remember that bladder training takes a few weeks to a few months to make a difference. You may not see results right away, but don't give up.  Follow-up care is a key part of your treatment and safety. Be sure to make and go to all appointments, and call your doctor if you are having problems. It's also a good idea to know your test results and keep a list of the medicines you take.  How can you care for yourself at home?  Work with your doctor to come up with a bladder training program that is right for you. You may use one or more of the following methods.  Delayed urination  In the beginning, try to keep from urinating for 5 minutes after you first feel the need to go.  While you wait, take deep, slow breaths to relax. Kegel exercises can also help you delay the need to go to the bathroom.  After some practice, when you can easily wait 5 minutes to urinate, try to wait 10 minutes before you urinate.  Slowly increase the waiting period until you are able to control when you have to urinate.  Scheduled urination  Empty your bladder when you first wake up in the morning.  Schedule times throughout the day when you will urinate.  Start by going to the bathroom every hour, even if you don't need to go.  Slowly increase the time between trips to the  "bathroom.  When you have found a schedule that works well for you, keep doing it.  If you wake up during the night and have to urinate, do it. Apply your schedule to waking hours only.  Kegel exercises  These tighten and strengthen pelvic muscles, which can help you control the flow of urine. (If doing these exercises causes pain, stop doing them and talk with your doctor.) To do Kegel exercises:  Squeeze your muscles as if you were trying not to pass gas. Or squeeze your muscles as if you were stopping the flow of urine. Your belly, legs, and buttocks shouldn't move.  Hold the squeeze for 3 seconds, then relax for 5 to 10 seconds.  Start with 3 seconds, then add 1 second each week until you are able to squeeze for 10 seconds.  Repeat the exercise 10 times a session. Do 3 to 8 sessions a day.  When should you call for help?  Watch closely for changes in your health, and be sure to contact your doctor if:    Your incontinence is getting worse.     You do not get better as expected.   Where can you learn more?  Go to https://www.[employer].net/patiented  Enter V684 in the search box to learn more about \"Bladder Training: Care Instructions.\"  Current as of: April 30, 2024  Content Version: 14.4    0990-6769 [employer].   Care instructions adapted under license by your healthcare professional. If you have questions about a medical condition or this instruction, always ask your healthcare professional. [employer] disclaims any warranty or liability for your use of this information.       "

## 2025-06-04 NOTE — PROGRESS NOTES
Preventive Care Visit  [facility] ELIDA Art MD, Family Medicine  Jun 4, 2025      Assessment & Plan     Type 2 diabetes mellitus with hyperlipidemia (H)  Well controlled   - HEMOGLOBIN A1C; Future  - Albumin Random Urine Quantitative with Creat Ratio; Future  - metFORMIN (GLUCOPHAGE XR) 500 MG 24 hr tablet; Take 2 tablets (1,000 mg) by mouth 2 times daily (with meals).  - glimepiride (AMARYL) 2 MG tablet; Take 1 tablet (2 mg) by mouth every morning (before breakfast).  - HEMOGLOBIN A1C  - Albumin Random Urine Quantitative with Creat Ratio    Screening for cardiovascular condition    - Lipid panel reflex to direct LDL Fasting; Future  - Lipid panel reflex to direct LDL Fasting    Visit for screening mammogram    - MA Screening Bilateral w/ Lauri; Future    Encounter for Medicare annual wellness exam      Benign essential hypertension  Check blood pressure at home   - hydrochlorothiazide (HYDRODIURIL) 25 MG tablet; Take 1 tablet (25 mg) by mouth daily.  - losartan (COZAAR) 100 MG tablet; Take 1 tablet (100 mg) by mouth daily.    Hyperlipidemia LDL goal <100    - Lipid panel reflex to direct LDL Fasting; Future  - pravastatin (PRAVACHOL) 40 MG tablet; Take 1 tablet (40 mg) by mouth daily.  - Lipid panel reflex to direct LDL Fasting    Osteopenia of both hips  Cont   - IBANdronate (BONIVA) 150 MG tablet; Take 1 tablet (150 mg) by mouth every 30 days.    Type 2 diabetes, HbA1c goal < 7% (H)  Check   - blood glucose (ACCU-CHEK JULIO CÉSAR PLUS) test strip; TWICE DAILY  - CT Coronary Calcium Scan; Future    Urinary urgency  Will have her try this   - tolterodine ER (DETROL LA) 2 MG 24 hr capsule; Take 1 capsule (2 mg) by mouth daily.    Shortness of breath  Recheck afer testing   - Echocardiogram Complete; Future  - CT Coronary Calcium Scan; Future    Patient has been advised of split billing requirements and indicates understanding: Yes        BMI  Estimated body mass index is 28.95 kg/m  as  "calculated from the following:    Height as of this encounter: 1.632 m (5' 4.25\").    Weight as of this encounter: 77.1 kg (170 lb).       Counseling  Appropriate preventive services were addressed with this patient via screening, questionnaire, or discussion as appropriate for fall prevention, nutrition, physical activity, Tobacco-use cessation, social engagement, weight loss and cognition.  Checklist reviewing preventive services available has been given to the patient.  Reviewed patient's diet, addressing concerns and/or questions.   She is at risk for lack of exercise and has been provided with information to increase physical activity for the benefit of her well-being.   Discussed possible causes of fatigue. Information on urinary incontinence and treatment options given to patient.       Follow-up    Follow-up Visit   Expected date:  Jun 11, 2026 (Approximate)      Follow Up Appointment Details:     Follow-up with whom?: PCP    Follow-Up for what?: Medicare Wellness    Welcome or Annual?: Annual Wellness    How?: In Person                 Melinda Mann is a 80 year old, presenting for the following:  Medicare Visit        6/4/2025     8:46 AM   Additional Questions   Roomed by Lay SANCHES CMA          History of Present Illness       Diabetes:   She presents for follow up of diabetes.  She is checking home blood glucose one time daily.   She checks blood glucose before meals.  Blood glucose is never over 200 and never under 70. She is aware of hypoglycemia symptoms including shakiness.    She has no concerns regarding her diabetes at this time.  She is having burning in feet.        She exercises with enough effort to increase her heart rate 10 to 19 minutes per day.  She exercises with enough effort to increase her heart rate 3 or less days per week.   She is taking medications regularly.    She is also having some issues with shortness of breath with activity no chest pain  no radiation this has been " going on for over 6 months ago     Diabetes Follow-up    Hyperlipidemia Follow-Up  Are you regularly taking any medication or supplement to lower your cholesterol?   Yes-    Are you having muscle aches or other side effects that you think could be caused by your cholesterol lowering medication?  No    Hypertension Follow-up  Do you check your blood pressure regularly outside of the clinic? Yes   Are you following a low salt diet? Yes  Are your blood pressures ever more than 140 on the top number (systolic) OR more   than 90 on the bottom number (diastolic), for example 140/90? No  All are under     BP Readings from Last 2 Encounters:   06/04/25 136/78   11/19/24 126/78     Hemoglobin A1C (%)   Date Value   06/04/2025 6.9 (H)   10/30/2024 7.5 (H)   02/04/2021 7.3 (H)   10/12/2020 8.3 (H)     LDL Cholesterol Calculated (mg/dL)   Date Value   04/30/2024 102 (H)   09/07/2022 84   10/12/2020 66   08/22/2019 74             Advance Care Planning    Document on file is a Health Care Directive or POLST.        6/4/2025   General Health   How would you rate your overall physical health? Good   Feel stress (tense, anxious, or unable to sleep) Not at all         6/4/2025   Nutrition   Diet: Diabetic         6/4/2025   Exercise   Days per week of moderate/strenous exercise 3 days   Average minutes spent exercising at this level 20 min         6/4/2025   Social Factors   Frequency of gathering with friends or relatives Three times a week   Worry food won't last until get money to buy more No   Food not last or not have enough money for food? No   Do you have housing? (Housing is defined as stable permanent housing and does not include staying outside in a car, in a tent, in an abandoned building, in an overnight shelter, or couch-surfing.) Yes   Are you worried about losing your housing? No   Lack of transportation? No   Unable to get utilities (heat,electricity)? No         6/4/2025   Fall Risk   Fallen 2 or more times in the  past year? No     No    No   Trouble with walking or balance? No     No    No       Proxy-reported    Multiple values from one day are sorted in reverse-chronological order          2025   Activities of Daily Living- Home Safety   Needs help with the following daily activites None of the above   Safety concerns in the home None of the above         2025   Dental   Dentist two times every year? Yes         2025   Hearing Screening   Hearing concerns? None of the above         2025   Driving Risk Screening   Patient/family members have concerns about driving No         2025   General Alertness/Fatigue Screening   Have you been more tired than usual lately? (!) YES         2025   Urinary Incontinence Screening   Bothered by leaking urine in past 6 months Yes         Today's PHQ-2 Score:       2025     9:04 AM   PHQ-2 (  [employer])   Q1: Little interest or pleasure in doing things 0    Q2: Feeling down, depressed or hopeless 0    PHQ-2 Score 0    Q1: Little interest or pleasure in doing things Not at all   Q2: Feeling down, depressed or hopeless Not at all   PHQ-2 Score 0       Proxy-reported           2025   Substance Use   Alcohol more than 3/day or more than 7/wk No   Do you have a current opioid prescription? No   How severe/bad is pain from 1 to 10? 0/10 (No Pain)   Do you use any other substances recreationally? No    (!) DECLINE       Multiple values from one day are sorted in reverse-chronological order     Social History     Tobacco Use    Smoking status: Former     Current packs/day: 0.00     Types: Cigarettes     Quit date: 1971     Years since quittin.4    Smokeless tobacco: Never   Vaping Use    Vaping status: Never Used   Substance Use Topics    Alcohol use: Yes     Comment: social    Drug use: No           2024   LAST FHS-7 RESULTS   1st degree relative breast or ovarian cancer No   Any relative bilateral breast cancer No   Any male have breast cancer No    Any ONE woman have BOTH breast AND ovarian cancer No   Any woman with breast cancer before 50yrs No   2 or more relatives with breast AND/OR ovarian cancer No   2 or more relatives with breast AND/OR bowel cancer Yes        Mammogram Screening - After age 74- determine frequency with patient based on health status, life expectancy and patient goals    ASCVD Risk   The 10-year ASCVD risk score (Jeremy ROSS, et al., 2019) is: 53.4%    Values used to calculate the score:      Age: 80 years      Sex: Female      Is Non- : No      Diabetic: Yes      Tobacco smoker: No      Systolic Blood Pressure: 136 mmHg      Is BP treated: Yes      HDL Cholesterol: 78 mg/dL      Total Cholesterol: 199 mg/dL    She gets up[ 2-3 times per night . She does have some urgency         Reviewed and updated as needed this visit by Provider                      Current providers sharing in care for this patient include:  Patient Care Team:  Dorota Art MD as PCP - General (Family Practice)  Dorota Art MD as Assigned PCP  Francisca Yoder RD as Diabetes Educator (Dietitian, Registered)    The following health maintenance items are reviewed in Epic and correct as of today:  Health Maintenance   Topic Date Due    LIPID  04/30/2025    MICROALBUMIN  04/30/2025    MAMMO SCREENING  06/12/2025    DIABETIC FOOT EXAM  10/02/2025    BMP  11/19/2025    A1C  12/04/2025    EYE EXAM  04/02/2026    MEDICARE ANNUAL WELLNESS VISIT  06/04/2026    ANNUAL REVIEW OF HM ORDERS  06/04/2026    FALL RISK ASSESSMENT  06/04/2026    DEXA  10/19/2028    COLORECTAL CANCER SCREENING  12/06/2028    ADVANCE CARE PLANNING  06/04/2030    DTAP/TDAP/TD VACCINE (3 - Td or Tdap) 06/03/2031    HEPATITIS C SCREENING  Completed    PHQ-2 (once per calendar year)  Completed    INFLUENZA VACCINE  Completed    PNEUMOCOCCAL VACCINE 50+ YEARS  Completed    ZOSTER VACCINE  Completed    RSV VACCINE  Completed    HPV VACCINE  Aged Out     "MENINGITIS VACCINE  Aged Out    COVID-19 VACCINE  Discontinued            Objective    Exam  /78   Pulse 74   Temp 98.3  F (36.8  C) (Tympanic)   Resp 12   Ht 1.632 m (5' 4.25\")   Wt 77.1 kg (170 lb)   SpO2 99%   BMI 28.95 kg/m     Estimated body mass index is 28.95 kg/m  as calculated from the following:    Height as of this encounter: 1.632 m (5' 4.25\").    Weight as of this encounter: 77.1 kg (170 lb).    Physical Exam  GENERAL: alert and no distress  RESP: lungs clear to auscultation - no rales, rhonchi or wheezes  CV: regular rate and rhythm, normal S1 S2, no S3 or S4, no murmur, click or rub, no peripheral edema          6/4/2025   Mini Cog   Clock Draw Score 2 Normal   3 Item Recall 3 objects recalled   Mini Cog Total Score 5              Signed Electronically by: Dorota Art MD    "

## 2025-06-05 ENCOUNTER — PATIENT OUTREACH (OUTPATIENT)
Dept: CARE COORDINATION | Facility: CLINIC | Age: 80
End: 2025-06-05
Payer: MEDICARE

## 2025-06-10 ENCOUNTER — RESULTS FOLLOW-UP (OUTPATIENT)
Dept: FAMILY MEDICINE | Facility: CLINIC | Age: 80
End: 2025-06-10

## 2025-06-10 DIAGNOSIS — R93.1 AGATSTON CAC SCORE, >400: Primary | ICD-10-CM

## 2025-07-01 ENCOUNTER — HOSPITAL ENCOUNTER (OUTPATIENT)
Dept: CT IMAGING | Facility: CLINIC | Age: 80
Discharge: HOME | End: 2025-07-01
Attending: FAMILY MEDICINE
Payer: MEDICARE

## 2025-07-01 ENCOUNTER — HOSPITAL ENCOUNTER (OUTPATIENT)
Dept: MAMMOGRAPHY | Facility: CLINIC | Age: 80
Discharge: HOME | End: 2025-07-01
Attending: FAMILY MEDICINE
Payer: MEDICARE

## 2025-07-01 DIAGNOSIS — Z12.31 VISIT FOR SCREENING MAMMOGRAM: ICD-10-CM

## 2025-07-01 DIAGNOSIS — R06.02 SHORTNESS OF BREATH: ICD-10-CM

## 2025-07-01 DIAGNOSIS — E11.9 TYPE 2 DIABETES, HBA1C GOAL < 7% (H): ICD-10-CM

## 2025-07-01 PROCEDURE — 77063 BREAST TOMOSYNTHESIS BI: CPT

## 2025-07-01 PROCEDURE — 75571 CT HRT W/O DYE W/CA TEST: CPT

## 2025-07-02 ENCOUNTER — RESULTS FOLLOW-UP (OUTPATIENT)
Dept: FAMILY MEDICINE | Facility: CLINIC | Age: 80
End: 2025-07-02

## 2025-07-03 ENCOUNTER — HOSPITAL ENCOUNTER (OUTPATIENT)
Dept: CARDIOLOGY | Facility: CLINIC | Age: 80
End: 2025-07-03
Attending: FAMILY MEDICINE
Payer: MEDICARE

## 2025-07-03 ENCOUNTER — PATIENT OUTREACH (OUTPATIENT)
Dept: CARE COORDINATION | Facility: CLINIC | Age: 80
End: 2025-07-03

## 2025-07-03 DIAGNOSIS — R06.02 SHORTNESS OF BREATH: ICD-10-CM

## 2025-07-03 LAB — LVEF ECHO: NORMAL

## 2025-07-03 PROCEDURE — 93306 TTE W/DOPPLER COMPLETE: CPT

## 2025-08-01 ENCOUNTER — TRANSFERRED RECORDS (OUTPATIENT)
Dept: HEALTH INFORMATION MANAGEMENT | Facility: CLINIC | Age: 80
End: 2025-08-01
Payer: MEDICARE

## 2025-08-28 ENCOUNTER — OFFICE VISIT (OUTPATIENT)
Dept: CARDIOLOGY | Facility: CLINIC | Age: 80
End: 2025-08-28
Attending: FAMILY MEDICINE
Payer: MEDICARE

## 2025-08-28 VITALS
OXYGEN SATURATION: 99 % | WEIGHT: 175.8 LBS | BODY MASS INDEX: 29.94 KG/M2 | SYSTOLIC BLOOD PRESSURE: 145 MMHG | DIASTOLIC BLOOD PRESSURE: 79 MMHG | HEART RATE: 99 BPM | RESPIRATION RATE: 16 BRPM

## 2025-08-28 DIAGNOSIS — I10 BENIGN ESSENTIAL HYPERTENSION: ICD-10-CM

## 2025-08-28 DIAGNOSIS — E11.9 TYPE 2 DIABETES, HBA1C GOAL < 7% (H): ICD-10-CM

## 2025-08-28 DIAGNOSIS — I25.119 CORONARY ARTERY DISEASE INVOLVING NATIVE CORONARY ARTERY OF NATIVE HEART WITH ANGINA PECTORIS: Primary | ICD-10-CM

## 2025-08-28 DIAGNOSIS — E78.5 DYSLIPIDEMIA, GOAL LDL BELOW 70: ICD-10-CM

## 2025-08-28 LAB
ATRIAL RATE - MUSE: 87 BPM
DIASTOLIC BLOOD PRESSURE - MUSE: NORMAL MMHG
INTERPRETATION ECG - MUSE: NORMAL
P AXIS - MUSE: 40 DEGREES
PR INTERVAL - MUSE: 164 MS
QRS DURATION - MUSE: 78 MS
QT - MUSE: 366 MS
QTC - MUSE: 440 MS
R AXIS - MUSE: 3 DEGREES
SYSTOLIC BLOOD PRESSURE - MUSE: NORMAL MMHG
T AXIS - MUSE: 33 DEGREES
VENTRICULAR RATE- MUSE: 87 BPM

## 2025-08-29 ENCOUNTER — TRANSFERRED RECORDS (OUTPATIENT)
Dept: HEALTH INFORMATION MANAGEMENT | Facility: CLINIC | Age: 80
End: 2025-08-29
Payer: MEDICARE

## 2025-09-02 LAB
ATRIAL RATE - MUSE: 87 BPM
DIASTOLIC BLOOD PRESSURE - MUSE: NORMAL MMHG
INTERPRETATION ECG - MUSE: NORMAL
P AXIS - MUSE: 40 DEGREES
PR INTERVAL - MUSE: 164 MS
QRS DURATION - MUSE: 78 MS
QT - MUSE: 366 MS
QTC - MUSE: 440 MS
R AXIS - MUSE: 3 DEGREES
SYSTOLIC BLOOD PRESSURE - MUSE: NORMAL MMHG
T AXIS - MUSE: 33 DEGREES
VENTRICULAR RATE- MUSE: 87 BPM

## (undated) DEVICE — DRAPE SHEET REV FOLD 3/4 9349

## (undated) DEVICE — PREP CHLORAPREP 26ML TINTED ORANGE  260815

## (undated) DEVICE — ESU PENCIL W/COATED BLADE E2450H

## (undated) DEVICE — SU PROLENE 0 CT-1 30" 8424H

## (undated) DEVICE — Device

## (undated) DEVICE — SOL NACL 0.9% IRRIG 1000ML BOTTLE 07138-09

## (undated) DEVICE — SU VICRYL 4-0 PS-2 18" UND J496H

## (undated) DEVICE — GOWN LG DISP 9515

## (undated) DEVICE — ANTIFOG SOLUTION W/FOAM PAD 31142527

## (undated) DEVICE — PACK EXTREMITY LATEX FREE SOP32HFFCS

## (undated) DEVICE — DRAPE C-ARM MINI 110788

## (undated) DEVICE — ESU GROUND PAD ADULT W/CORD E7507

## (undated) DEVICE — DEVICE FIXATION ABSORBLE TACK 5MM SINGLE ABSTACK30X

## (undated) DEVICE — ENDO TROCAR BALLOON TIP 10MM OMS-T10BT

## (undated) DEVICE — DRSG JUMPSTART ANTIMICROBIAL 4X4" ABS-4004

## (undated) DEVICE — ENDO SHEARS 5MM 176643

## (undated) DEVICE — COVER CAMERA IN-LIGHT DISP LT-C02

## (undated) DEVICE — BLADE SAW OSCILLATING STRYK MED 9.0X25X0.38MM 2296-003-111

## (undated) DEVICE — CAST PADDING 4" STERILE 9044S

## (undated) DEVICE — SUCTION IRR STRYKERFLOW II W/TIP 250-070-520

## (undated) DEVICE — ENDO TROCAR BLUNT 100MM W/THRD ANCHOR BLUNTPORT BPT12STS

## (undated) DEVICE — SU VICRYL 2-0 SH 27" UND J417H

## (undated) DEVICE — LINEN TOWEL PACK X6 WHITE 5487

## (undated) DEVICE — DEVICE SUTURE GRASPER TROCAR CLOSURE 14GA PMITCSG

## (undated) DEVICE — DRAPE STERI TOWEL SM 1000

## (undated) DEVICE — DRSG PRIMAPORE 02X3" 7133

## (undated) DEVICE — LINEN TOWEL PACK X30 5481

## (undated) DEVICE — LIGHT HANDLE X1 31140133

## (undated) DEVICE — DRSG ABDOMINAL 07 1/2X8" 7197D

## (undated) DEVICE — SOL WATER IRRIG 1000ML BOTTLE 2F7114

## (undated) DEVICE — BNDG ELASTIC 4"X5YDS UNSTERILE 6611-40

## (undated) DEVICE — GLOVE PROTEXIS POWDER FREE SMT 8.0  2D72PT80X

## (undated) DEVICE — GLOVE PROTEXIS POWDER FREE 7.5 ORTHOPEDIC 2D73ET75

## (undated) DEVICE — SOL ADH LIQUID BENZOIN SWAB 0.6ML C1544

## (undated) DEVICE — GLOVE PROTEXIS POWDER FREE 8.0 ORTHOPEDIC 2D73ET80

## (undated) DEVICE — ENDO CANNULA 05MM VERSAONE UNIVERSAL UNVCA5STF

## (undated) DEVICE — SOL NACL 0.9% INJ 1000ML BAG 07983-09

## (undated) DEVICE — SUCTION MANIFOLD DORNOCH ULTRA CART UL-CL500

## (undated) DEVICE — ENDO TROCAR 05MM VERSAONE BLADELESS W/STD FIX CAN NONB5STF

## (undated) DEVICE — NDL INSUFFLATION 150MM VERRES 172016

## (undated) DEVICE — DECANTER VIAL 2006S

## (undated) DEVICE — DRAPE EXTREMITY W/ARMBOARD 29405

## (undated) DEVICE — SU ETHILON 3-0 PS-2 18" 1669H

## (undated) DEVICE — DRSG GAUZE 4X4" TRAY

## (undated) RX ORDER — FENTANYL CITRATE 50 UG/ML
INJECTION, SOLUTION INTRAMUSCULAR; INTRAVENOUS
Status: DISPENSED
Start: 2018-06-25

## (undated) RX ORDER — LIDOCAINE HYDROCHLORIDE AND EPINEPHRINE 10; 10 MG/ML; UG/ML
INJECTION, SOLUTION INFILTRATION; PERINEURAL
Status: DISPENSED
Start: 2018-06-25

## (undated) RX ORDER — HYDROMORPHONE HYDROCHLORIDE 1 MG/ML
INJECTION, SOLUTION INTRAMUSCULAR; INTRAVENOUS; SUBCUTANEOUS
Status: DISPENSED
Start: 2018-06-25

## (undated) RX ORDER — KETAMINE HCL IN 0.9 % NACL 50 MG/5 ML
SYRINGE (ML) INTRAVENOUS
Status: DISPENSED
Start: 2019-10-03

## (undated) RX ORDER — LIDOCAINE HYDROCHLORIDE 10 MG/ML
INJECTION, SOLUTION INFILTRATION; PERINEURAL
Status: DISPENSED
Start: 2019-10-03

## (undated) RX ORDER — FENTANYL CITRATE 50 UG/ML
INJECTION, SOLUTION INTRAMUSCULAR; INTRAVENOUS
Status: DISPENSED
Start: 2019-10-03

## (undated) RX ORDER — LIDOCAINE HYDROCHLORIDE 10 MG/ML
INJECTION, SOLUTION EPIDURAL; INFILTRATION; INTRACAUDAL; PERINEURAL
Status: DISPENSED
Start: 2019-10-03

## (undated) RX ORDER — CEFAZOLIN SODIUM 2 G/100ML
INJECTION, SOLUTION INTRAVENOUS
Status: DISPENSED
Start: 2019-10-03

## (undated) RX ORDER — KETOROLAC TROMETHAMINE 30 MG/ML
INJECTION, SOLUTION INTRAMUSCULAR; INTRAVENOUS
Status: DISPENSED
Start: 2019-10-03

## (undated) RX ORDER — ONDANSETRON 2 MG/ML
INJECTION INTRAMUSCULAR; INTRAVENOUS
Status: DISPENSED
Start: 2019-10-03

## (undated) RX ORDER — BUPIVACAINE HYDROCHLORIDE 5 MG/ML
INJECTION, SOLUTION PERINEURAL
Status: DISPENSED
Start: 2019-10-03

## (undated) RX ORDER — PROPOFOL 10 MG/ML
INJECTION, EMULSION INTRAVENOUS
Status: DISPENSED
Start: 2019-10-03

## (undated) RX ORDER — BUPIVACAINE HYDROCHLORIDE AND EPINEPHRINE 5; 5 MG/ML; UG/ML
INJECTION, SOLUTION EPIDURAL; INTRACAUDAL; PERINEURAL
Status: DISPENSED
Start: 2018-06-25